# Patient Record
Sex: FEMALE | Race: BLACK OR AFRICAN AMERICAN | Employment: OTHER | URBAN - METROPOLITAN AREA
[De-identification: names, ages, dates, MRNs, and addresses within clinical notes are randomized per-mention and may not be internally consistent; named-entity substitution may affect disease eponyms.]

---

## 2019-07-02 LAB — LDL-C, EXTERNAL: 87

## 2019-12-19 LAB
CREATININE, EXTERNAL: 0.85
MICROALBUMIN UR TEST STR-MCNC: NORMAL MG/DL

## 2020-01-17 LAB — MAMMOGRAPHY, EXTERNAL: NORMAL

## 2020-08-28 VITALS
TEMPERATURE: 98.8 F | HEART RATE: 67 BPM | RESPIRATION RATE: 18 BRPM | WEIGHT: 172 LBS | HEIGHT: 62 IN | OXYGEN SATURATION: 96 % | SYSTOLIC BLOOD PRESSURE: 131 MMHG | DIASTOLIC BLOOD PRESSURE: 63 MMHG | BODY MASS INDEX: 31.65 KG/M2

## 2020-08-28 PROBLEM — I10 HYPERTENSIVE DISORDER: Status: ACTIVE | Noted: 2020-08-28

## 2020-08-28 PROBLEM — K21.9 GERD (GASTROESOPHAGEAL REFLUX DISEASE): Status: ACTIVE | Noted: 2020-08-28

## 2020-08-28 RX ORDER — ALBUTEROL SULFATE 90 UG/1
AEROSOL, METERED RESPIRATORY (INHALATION)
COMMUNITY
End: 2020-11-10 | Stop reason: ALTCHOICE

## 2020-08-28 RX ORDER — HYDROCHLOROTHIAZIDE 25 MG/1
25 TABLET ORAL DAILY
COMMUNITY
End: 2021-03-08

## 2020-08-28 RX ORDER — FAMOTIDINE 20 MG/1
20 TABLET, FILM COATED ORAL 2 TIMES DAILY
COMMUNITY
End: 2020-11-10 | Stop reason: ALTCHOICE

## 2020-08-28 RX ORDER — METOPROLOL SUCCINATE 25 MG/1
TABLET, EXTENDED RELEASE ORAL DAILY
COMMUNITY
End: 2020-11-10 | Stop reason: ALTCHOICE

## 2020-08-28 RX ORDER — NAPROXEN 500 MG/1
500 TABLET ORAL 2 TIMES DAILY WITH MEALS
COMMUNITY
End: 2020-11-10 | Stop reason: ALTCHOICE

## 2020-11-10 ENCOUNTER — OFFICE VISIT (OUTPATIENT)
Dept: PRIMARY CARE CLINIC | Age: 80
End: 2020-11-10
Payer: MEDICARE

## 2020-11-10 VITALS
HEART RATE: 71 BPM | HEIGHT: 60 IN | SYSTOLIC BLOOD PRESSURE: 114 MMHG | TEMPERATURE: 98.5 F | OXYGEN SATURATION: 98 % | WEIGHT: 167 LBS | DIASTOLIC BLOOD PRESSURE: 60 MMHG | BODY MASS INDEX: 32.79 KG/M2 | RESPIRATION RATE: 18 BRPM

## 2020-11-10 DIAGNOSIS — R00.0 TACHYCARDIA: ICD-10-CM

## 2020-11-10 DIAGNOSIS — I10 ESSENTIAL HYPERTENSION: ICD-10-CM

## 2020-11-10 DIAGNOSIS — E66.9 OBESITY (BMI 30-39.9): ICD-10-CM

## 2020-11-10 DIAGNOSIS — H81.13 BENIGN PAROXYSMAL POSITIONAL VERTIGO DUE TO BILATERAL VESTIBULAR DISORDER: Primary | ICD-10-CM

## 2020-11-10 PROCEDURE — 99214 OFFICE O/P EST MOD 30 MIN: CPT | Performed by: NURSE PRACTITIONER

## 2020-11-10 RX ORDER — MECLIZINE HYDROCHLORIDE 25 MG/1
25 TABLET ORAL
Qty: 30 TAB | Refills: 0 | Status: SHIPPED | OUTPATIENT
Start: 2020-11-10 | End: 2020-11-10 | Stop reason: SDUPTHER

## 2020-11-10 RX ORDER — MECLIZINE HYDROCHLORIDE 25 MG/1
25 TABLET ORAL
Qty: 30 TAB | Refills: 0 | Status: SHIPPED | OUTPATIENT
Start: 2020-11-10 | End: 2020-11-20

## 2020-11-10 RX ORDER — METOPROLOL SUCCINATE 25 MG/1
0.5 TABLET, EXTENDED RELEASE ORAL DAILY
COMMUNITY
End: 2022-04-06 | Stop reason: SDUPTHER

## 2020-11-10 NOTE — PATIENT INSTRUCTIONS
Vertigo: Exercises Introduction Here are some examples of exercises for you to try. The exercises may be suggested for a condition or for rehabilitation. Start each exercise slowly. Ease off the exercises if you start to have pain. You will be told when to start these exercises and which ones will work best for you. How to do the exercises Exercise 1 1. Stand with a chair in front of you and a wall behind you. If you begin to fall, you may use them for support. 2. Stand with your feet together and your arms at your sides. 3. Move your head up and down 10 times. Exercise 2 1. Move your head side to side 10 times. Exercise 3 1. Move your head diagonally up and down 10 times. Exercise 4 1. Move your head diagonally up and down 10 times on the other side. Follow-up care is a key part of your treatment and safety. Be sure to make and go to all appointments, and call your doctor if you are having problems. It's also a good idea to know your test results and keep a list of the medicines you take. Where can you learn more? Go to http://www.gray.com/ Enter F349 in the search box to learn more about \"Vertigo: Exercises. \" Current as of: April 15, 2020               Content Version: 12.6 © 2006-2020 [employer]. Care instructions adapted under license by [employer] (which disclaims liability or warranty for this information). If you have questions about a medical condition or this instruction, always ask your healthcare professional. [place] [address] any warranty or liability for your use of this information. Vertigo: Exercises Introduction Here are some examples of exercises for you to try. The exercises may be suggested for a condition or for rehabilitation. Start each exercise slowly. Ease off the exercises if you start to have pain.  
You will be told when to start these exercises and which ones will work best for you. How to do the exercises Exercise 1  
4. Stand with a chair in front of you and a wall behind you. If you begin to fall, you may use them for support. 5. Stand with your feet together and your arms at your sides. 6. Move your head up and down 10 times. Exercise 2 2. Move your head side to side 10 times. Exercise 3  
2. Move your head diagonally up and down 10 times. Exercise 4  
2. Move your head diagonally up and down 10 times on the other side. Follow-up care is a key part of your treatment and safety. Be sure to make and go to all appointments, and call your doctor if you are having problems. It's also a good idea to know your test results and keep a list of the medicines you take. Where can you learn more? Go to http://www.gray.com/ Enter F349 in the search box to learn more about \"Vertigo: Exercises. \" Current as of: April 15, 2020               Content Version: 12.6 © 2006-2020 [employer]. Care instructions adapted under license by [employer] (which disclaims liability or warranty for this information). If you have questions about a medical condition or this instruction, always ask your healthcare professional. [address] any warranty or liability for your use of this information. [place] Exercises for Vertigo: Care Instructions Your Care Instructions Simple exercises can help you regain your balance when you have vertigo. If you have Ménière's disease, benign paroxysmal positional vertigo (BPPV), or another inner ear problem, you may have vertigo off and on. Do these exercises first thing in the morning and before you go to bed. You might get dizzy when you first start them. If this happens, try to do them for at least 5 minutes. Do a group of exercises at a time, starting at the top of the list. It may take several weeks before you can do all the exercises without feeling dizzy. Follow-up care is a key part of your treatment and safety. Be sure to make and go to all appointments, and call your doctor if you are having problems. It's also a good idea to know your test results and keep a list of the medicines you take. How can you care for yourself at home? Exercise 1 While sitting on the side of the bed and holding your head still: 
· Look up as far as you can. · Look down as far as you can. · Look from side to side as far as you can. · Stretch your arm straight out in front of you. Focus on your index finger. Continue to focus on your finger while you bring it to your nose. Exercise 2 While sitting on the side of the bed: · Bring your head as far back as you can. · Bring your head forward to touch your chin to your chest. 
· Turn your head from side to side. · Do these exercises first with your eyes open. Then try with your eyes closed. Exercise 3 While sitting on the side of the bed: · Shrug your shoulders straight upward, then relax them. · Bend over and try to touch the ground with your fingers. Then go back to a sitting position. · Toss a small ball from one hand to the other. Throw the ball higher than your eyes so you have to look up. Exercise 4 While standing (with someone close by if you feel uncomfortable): 
· Repeat Exercise 1. 
· Repeat Exercise 2. 
· Pass a ball between your legs and above your head. · Sit down and then stand up. Repeat. Turn around in a [place] a different way each time you stand. · With someone close by to help you, try the above exercises with your eyes closed. Exercise 5 In a room that is cleared of obstacles: 
· Walk to a corner of the room, turn to your right, and walk back to the starting point. Now, repeat and turn left. · Walk up and down a slope. Now try stairs. · While holding on to someone's arm, try these exercises with your eyes closed. When should you call for help? Watch closely for changes in your health, and be sure to contact your doctor if: 
  · You do not get better as expected. Where can you learn more? Go to http://www.gray.com/ Enter F155 in the search box to learn more about \"[place] Exercises for Vertigo: Care Instructions. \" Current as of: April 15, 2020               Content Version: 12.6 © 2006-2020 [employer].. Care instructions adapted under license by [employer] (which disclaims liability or warranty for this information). If you have questions about a medical condition or this instruction, always ask your healthcare professional. [address] any warranty or liability for your use of this information. [place] Exercises for Vertigo: Care Instructions Your Care Instructions Simple exercises can help you regain your balance when you have vertigo. If you have Ménière's disease, benign paroxysmal positional vertigo (BPPV), or another inner ear problem, you may have vertigo off and on. Do these exercises first thing in the morning and before you go to bed. You might get dizzy when you first start them. If this happens, try to do them for at least 5 minutes. Do a group of exercises at a time, starting at the top of the list. It may take several weeks before you can do all the exercises without feeling dizzy. Follow-up care is a key part of your treatment and safety. Be sure to make and go to all appointments, and call your doctor if you are having problems. It's also a good idea to know your test results and keep a list of the medicines you take. How can you care for yourself at home? Exercise 1 While sitting on the side of the bed and holding your head still: 
· Look up as far as you can. · Look down as far as you can. · Look from side to side as far as you can. · Stretch your arm straight out in front of you.  Focus on your index finger. Continue to focus on your finger while you bring it to your nose. Exercise 2 While sitting on the side of the bed: · Bring your head as far back as you can. · Bring your head forward to touch your chin to your chest. 
· Turn your head from side to side. · Do these exercises first with your eyes open. Then try with your eyes closed. Exercise 3 While sitting on the side of the bed: · Shrug your shoulders straight upward, then relax them. · Bend over and try to touch the ground with your fingers. Then go back to a sitting position. · Toss a small ball from one hand to the other. Throw the ball higher than your eyes so you have to look up. Exercise 4 While standing (with someone close by if you feel uncomfortable): 
· Repeat Exercise 1. 
· Repeat Exercise 2. 
· Pass a ball between your legs and above your head. · Sit down and then stand up. Repeat. Turn around in a [place] a different way each time you stand. · With someone close by to help you, try the above exercises with your eyes closed. Exercise 5 In a room that is cleared of obstacles: 
· Walk to a corner of the room, turn to your right, and walk back to the starting point. Now, repeat and turn left. · Walk up and down a slope. Now try stairs. · While holding on to someone's arm, try these exercises with your eyes closed. When should you call for help? Watch closely for changes in your health, and be sure to contact your doctor if: 
  · You do not get better as expected. Where can you learn more? Go to http://www.gray.com/ Enter V791 in the search box to learn more about \"[place] Exercises for Vertigo: Care Instructions. \" Current as of: April 15, 2020               Content Version: 12.6 © 7391-9915 [employer]. Care instructions adapted under license by [employer] (which disclaims liability or warranty for this information).  If you have questions about a medical condition or this instruction, always ask your healthcare professional. [place] [address] any warranty or liability for your use of this information.

## 2020-11-13 ENCOUNTER — TELEPHONE (OUTPATIENT)
Dept: PRIMARY CARE CLINIC | Age: 80
End: 2020-11-13

## 2020-11-13 LAB
ALBUMIN SERPL-MCNC: 4.6 G/DL (ref 3.7–4.7)
ALBUMIN/GLOB SERPL: 1.9 {RATIO} (ref 1.2–2.2)
ALP SERPL-CCNC: 79 IU/L (ref 39–117)
ALT SERPL-CCNC: 17 IU/L (ref 0–32)
APPEARANCE UR: CLEAR
AST SERPL-CCNC: 23 IU/L (ref 0–40)
BACTERIA #/AREA URNS HPF: NORMAL /[HPF]
BASOPHILS # BLD AUTO: 0 X10E3/UL (ref 0–0.2)
BASOPHILS NFR BLD AUTO: 1 %
BILIRUB SERPL-MCNC: 0.3 MG/DL (ref 0–1.2)
BILIRUB UR QL STRIP: NEGATIVE
BUN SERPL-MCNC: 14 MG/DL (ref 8–27)
BUN/CREAT SERPL: 15 (ref 12–28)
CALCIUM SERPL-MCNC: 9.8 MG/DL (ref 8.7–10.3)
CASTS URNS QL MICRO: NORMAL /LPF
CHLORIDE SERPL-SCNC: 102 MMOL/L (ref 96–106)
CO2 SERPL-SCNC: 26 MMOL/L (ref 20–29)
COLOR UR: YELLOW
CREAT SERPL-MCNC: 0.95 MG/DL (ref 0.57–1)
EOSINOPHIL # BLD AUTO: 0.1 X10E3/UL (ref 0–0.4)
EOSINOPHIL NFR BLD AUTO: 1 %
EPI CELLS #/AREA URNS HPF: NORMAL /HPF (ref 0–10)
ERYTHROCYTE [DISTWIDTH] IN BLOOD BY AUTOMATED COUNT: 14.4 % (ref 11.7–15.4)
GLOBULIN SER CALC-MCNC: 2.4 G/DL (ref 1.5–4.5)
GLUCOSE SERPL-MCNC: 79 MG/DL (ref 65–99)
GLUCOSE UR QL: NEGATIVE
HCT VFR BLD AUTO: 40.6 % (ref 34–46.6)
HGB BLD-MCNC: 13.5 G/DL (ref 11.1–15.9)
HGB UR QL STRIP: NEGATIVE
IMM GRANULOCYTES # BLD AUTO: 0 X10E3/UL (ref 0–0.1)
IMM GRANULOCYTES NFR BLD AUTO: 0 %
KETONES UR QL STRIP: NEGATIVE
LEUKOCYTE ESTERASE UR QL STRIP: ABNORMAL
LYMPHOCYTES # BLD AUTO: 3.1 X10E3/UL (ref 0.7–3.1)
LYMPHOCYTES NFR BLD AUTO: 54 %
MCH RBC QN AUTO: 27.3 PG (ref 26.6–33)
MCHC RBC AUTO-ENTMCNC: 33.3 G/DL (ref 31.5–35.7)
MCV RBC AUTO: 82 FL (ref 79–97)
MICRO URNS: ABNORMAL
MONOCYTES # BLD AUTO: 0.4 X10E3/UL (ref 0.1–0.9)
MONOCYTES NFR BLD AUTO: 7 %
MUCOUS THREADS URNS QL MICRO: PRESENT
NEUTROPHILS # BLD AUTO: 2.2 X10E3/UL (ref 1.4–7)
NEUTROPHILS NFR BLD AUTO: 37 %
NITRITE UR QL STRIP: NEGATIVE
PH UR STRIP: 6.5 [PH] (ref 5–7.5)
PLATELET # BLD AUTO: 240 X10E3/UL (ref 150–450)
POTASSIUM SERPL-SCNC: 4.1 MMOL/L (ref 3.5–5.2)
PROT SERPL-MCNC: 7 G/DL (ref 6–8.5)
PROT UR QL STRIP: NEGATIVE
RBC # BLD AUTO: 4.94 X10E6/UL (ref 3.77–5.28)
RBC #/AREA URNS HPF: NORMAL /HPF (ref 0–2)
SODIUM SERPL-SCNC: 141 MMOL/L (ref 134–144)
SP GR UR: 1.02 (ref 1–1.03)
UROBILINOGEN UR STRIP-MCNC: 1 MG/DL (ref 0.2–1)
WBC # BLD AUTO: 5.8 X10E3/UL (ref 3.4–10.8)
WBC #/AREA URNS HPF: NORMAL /HPF (ref 0–5)

## 2020-11-13 NOTE — PROGRESS NOTES
Georgia Shay is a [de-identified] y.o. female who presents to the office today for the following:    Chief Complaint   Patient presents with    Dizziness       Past Medical History:   Diagnosis Date    Arthritis     Diverticulosis     GERD (gastroesophageal reflux disease)     Hypertension     Tachycardia     Wears glasses        Past Surgical History:   Procedure Laterality Date    HX COLONOSCOPY      HX HYSTERECTOMY      partial        Family History   Problem Relation Age of Onset    Diabetes Mother     Arthritis-osteo Father         Social History     Tobacco Use    Smoking status: Current Every Day Smoker     Packs/day: 0.50    Smokeless tobacco: Never Used   Substance Use Topics    Alcohol use: Yes     Comment: occasional    Drug use: Never        HPI  Patient here with c/o feeling off balance for several days especially when getting up. Denies an LOC, chest pain, n/v, sob, palpitations or falls. Has had similar symptoms in past. Has PMH of hypertension, GERD and tachycardia that she takes medication for. Otherwise has been feeling well. Current Outpatient Medications on File Prior to Visit   Medication Sig    metoprolol succinate (TOPROL-XL) 25 mg XL tablet Take 0.5 Tabs by mouth daily.  hydroCHLOROthiazide (HYDRODIURIL) 25 mg tablet Take 25 mg by mouth daily. No current facility-administered medications on file prior to visit. Medications Ordered Today   Medications    DISCONTD: meclizine (ANTIVERT) 25 mg tablet     Sig: Take 1 Tab by mouth three (3) times daily as needed for Dizziness for up to 10 days. Dispense:  30 Tab     Refill:  0    meclizine (ANTIVERT) 25 mg tablet     Sig: Take 1 Tab by mouth three (3) times daily as needed for Dizziness for up to 10 days. Dispense:  30 Tab     Refill:  0        Review of Systems   Constitutional: Negative. HENT: Negative for congestion, ear pain, nosebleeds, sinus pain and sore throat. Eyes: Negative. Respiratory: Negative. Cardiovascular: Negative. Gastrointestinal: Negative. Genitourinary: Negative. Musculoskeletal: Positive for myalgias (occasional). Neurological: Positive for dizziness. Negative for tingling, tremors, sensory change, speech change, focal weakness, seizures, loss of consciousness, weakness and headaches. Visit Vitals  /60 (BP 1 Location: Left arm, BP Patient Position: Standing)   Pulse 71   Temp 98.5 °F (36.9 °C) (Tympanic)   Resp 18   Ht 5' (1.524 m)   Wt 167 lb (75.8 kg)   SpO2 98%   BMI 32.61 kg/m²       Physical Exam  Vitals signs and nursing note reviewed. Constitutional:       Appearance: Normal appearance. HENT:      Right Ear: Tympanic membrane normal.      Left Ear: Tympanic membrane normal.      Nose: Nose normal.      Mouth/Throat:      Mouth: Mucous membranes are moist.      Pharynx: Oropharynx is clear. Eyes:      Pupils: Pupils are equal, round, and reactive to light. Cardiovascular:      Rate and Rhythm: Normal rate and regular rhythm. Pulses: Normal pulses. Heart sounds: Normal heart sounds. Pulmonary:      Effort: Pulmonary effort is normal.      Breath sounds: Normal breath sounds. Abdominal:      General: Bowel sounds are normal.      Palpations: Abdomen is soft. Musculoskeletal: Normal range of motion. Skin:     General: Skin is warm and dry. Neurological:      Mental Status: She is alert. Mental status is at baseline. 1. 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 Dispense: 30 Tab; Refill: 0    2.  Essential hypertension  Blood pressure is controlled and continue medication as directed  Check labwork as this is overdue  - CBC WITH AUTOMATED DIFF  - METABOLIC PANEL, COMPREHENSIVE  - URINALYSIS W/ RFLX MICROSCOPIC    3. Tachycardia  This is stable and continue BB therapy as directed    4. Obesity  Continue to encourage weight loss. Recommend decreasing excess fat, salt and sugar in diet along with getting regular exercise 3-5 times weekly for 30-45 minutes consistently. Patient verbalizes understanding of plan of care as discussed above    Follow-up and Dispositions    · Return in about 3 months (around 2/10/2021) for or sooner for worsening symptoms.

## 2020-11-13 NOTE — TELEPHONE ENCOUNTER
called pt and informed them of the results    ----- Message from Virginia Morelos NP sent at 11/13/2020  3:19 PM EST -----  Please let patient know that lab results are essentially normal.

## 2020-12-05 ENCOUNTER — HOSPITAL ENCOUNTER (EMERGENCY)
Age: 80
Discharge: HOME | End: 2020-12-05
Attending: EMERGENCY MEDICINE
Payer: MEDICARE

## 2020-12-05 ENCOUNTER — APPOINTMENT (OUTPATIENT)
Dept: GENERAL RADIOLOGY | Age: 80
End: 2020-12-05
Attending: EMERGENCY MEDICINE
Payer: MEDICARE

## 2020-12-05 VITALS
RESPIRATION RATE: 16 BRPM | OXYGEN SATURATION: 99 % | WEIGHT: 167 LBS | BODY MASS INDEX: 32.61 KG/M2 | HEART RATE: 72 BPM | TEMPERATURE: 98.8 F | DIASTOLIC BLOOD PRESSURE: 61 MMHG | SYSTOLIC BLOOD PRESSURE: 145 MMHG

## 2020-12-05 DIAGNOSIS — S20.211A CONTUSION OF RIGHT CHEST WALL, INITIAL ENCOUNTER: Primary | ICD-10-CM

## 2020-12-05 PROCEDURE — 74011250637 HC RX REV CODE- 250/637: Performed by: EMERGENCY MEDICINE

## 2020-12-05 PROCEDURE — 71046 X-RAY EXAM CHEST 2 VIEWS: CPT

## 2020-12-05 PROCEDURE — 99283 EMERGENCY DEPT VISIT LOW MDM: CPT

## 2020-12-05 RX ORDER — IBUPROFEN 400 MG/1
400 TABLET ORAL ONCE
Status: COMPLETED | OUTPATIENT
Start: 2020-12-05 | End: 2020-12-05

## 2020-12-05 RX ADMIN — IBUPROFEN 400 MG: 400 TABLET, FILM COATED ORAL at 08:39

## 2020-12-05 NOTE — ED TRIAGE NOTES
Pt states she fell out of bed last night and is having right sided rib pain. Pt rates pain at 5/10 that increases with movement and bending. Pt has no difficulty breathing.

## 2020-12-05 NOTE — ED PROVIDER NOTES
HPI   Patient reports that she fell from her bed while sleeping overnight striking her anterior chest and causing pain. Pain is described as a sharp and stabbing and is worsened by bending at the waist and deep inspiration. It is relieved by rest.  Patient has not attempted any other form of relief. She denies shortness of breath, severe pain, any other injury or trauma.   Past Medical History:   Diagnosis Date    Arthritis     Diverticulosis     GERD (gastroesophageal reflux disease)     Hypertension     Tachycardia     Wears glasses        Past Surgical History:   Procedure Laterality Date    HX COLONOSCOPY      HX HYSTERECTOMY      partial         Family History:   Problem Relation Age of Onset    Diabetes Mother     Arthritis-osteo Father        Social History     Socioeconomic History    Marital status:      Spouse name: Not on file    Number of children: Not on file    Years of education: Not on file    Highest education level: Not on file   Occupational History    Not on file   Social Needs    Financial resource strain: Not on file    Food insecurity     Worry: Not on file     Inability: Not on file    Transportation needs     Medical: Not on file     Non-medical: Not on file   Tobacco Use    Smoking status: Current Every Day Smoker     Packs/day: 0.50    Smokeless tobacco: Never Used   Substance and Sexual Activity    Alcohol use: Yes     Comment: occasional    Drug use: Never    Sexual activity: Not on file   Lifestyle    Physical activity     Days per week: Not on file     Minutes per session: Not on file    Stress: Not on file   Relationships    Social connections     Talks on phone: Not on file     Gets together: Not on file     Attends [employer] service: Not on file     Active member of club or organization: Not on file     Attends meetings of clubs or organizations: Not on file     Relationship status: Not on file    Intimate partner violence     Fear of current or ex partner: Not on file     Emotionally abused: Not on file     Physically abused: Not on file     Forced sexual activity: Not on file   Other Topics Concern    Not on file   Social History Narrative    Not on file         ALLERGIES: Pcn [penicillins]    Review of Systems   Constitutional: Negative. HENT: Negative. Eyes: Negative. Respiratory: Negative. Cardiovascular: Negative. Gastrointestinal: Negative. Endocrine: Negative. Genitourinary: Negative. Musculoskeletal: Negative. Allergic/Immunologic: Negative. Neurological: Negative. Hematological: Negative. Psychiatric/Behavioral: Negative. All other systems reviewed and are negative. Vitals:    12/05/20 0829   BP: (!) 145/61   Pulse: 72   Resp: 16   Temp: 98.8 °F (37.1 °C)   SpO2: 99%   Weight: 75.8 kg (167 lb)            Physical Exam  Vitals signs and nursing note reviewed. Constitutional:       General: She is not in acute distress. Appearance: Normal appearance. She is normal weight. She is not ill-appearing, toxic-appearing or diaphoretic. HENT:      Head: Normocephalic and atraumatic. Nose: Nose normal.      Mouth/Throat:      Mouth: Mucous membranes are moist.   Eyes:      Extraocular Movements: Extraocular movements intact. Pupils: Pupils are equal, round, and reactive to light. Neck:      Musculoskeletal: Normal range of motion. Pulmonary:      Effort: Pulmonary effort is normal.      Breath sounds: Normal breath sounds. Comments: Mild lower sternal tenderness. No obvious sign of serious trauma. Chest:      Chest wall: Tenderness present. Musculoskeletal: Normal range of motion. General: No swelling, tenderness, deformity or signs of injury. Skin:     General: Skin is warm and dry. Neurological:      General: No focal deficit present. Mental Status: She is alert and oriented to person, place, and time. Mental status is at baseline.       Cranial Nerves: No cranial nerve deficit. Psychiatric:         Mood and Affect: Mood normal.         Behavior: Behavior normal.          MDM       History and physical consistent with a chest wall/rib contusion. Will check x-ray to rule out fracture. Likely discharge.   Procedures

## 2020-12-09 ENCOUNTER — OFFICE VISIT (OUTPATIENT)
Dept: PRIMARY CARE CLINIC | Age: 80
End: 2020-12-09
Payer: MEDICARE

## 2020-12-09 VITALS
TEMPERATURE: 96.8 F | OXYGEN SATURATION: 98 % | RESPIRATION RATE: 18 BRPM | SYSTOLIC BLOOD PRESSURE: 140 MMHG | HEIGHT: 60 IN | HEART RATE: 71 BPM | BODY MASS INDEX: 33.77 KG/M2 | WEIGHT: 172 LBS | DIASTOLIC BLOOD PRESSURE: 69 MMHG

## 2020-12-09 DIAGNOSIS — S20.211A CONTUSION OF RIB ON RIGHT SIDE, INITIAL ENCOUNTER: Primary | ICD-10-CM

## 2020-12-09 PROCEDURE — 99213 OFFICE O/P EST LOW 20 MIN: CPT | Performed by: NURSE PRACTITIONER

## 2020-12-09 RX ORDER — CYCLOBENZAPRINE HCL 5 MG
5 TABLET ORAL
Qty: 20 TAB | Refills: 0 | Status: SHIPPED | OUTPATIENT
Start: 2020-12-09 | End: 2021-02-02 | Stop reason: ALTCHOICE

## 2020-12-09 NOTE — PROGRESS NOTES
Yoel Russ is a [de-identified] y.o. female who presents to the office today for the following:    Chief Complaint   Patient presents with    ED Follow-up    Fall    Rib Pain       Past Medical History:   Diagnosis Date    Arthritis     Diverticulosis     GERD (gastroesophageal reflux disease)     Hypertension     Tachycardia     Wears glasses        Past Surgical History:   Procedure Laterality Date    HX COLONOSCOPY      HX HYSTERECTOMY      partial        Family History   Problem Relation Age of Onset    Diabetes Mother     Arthritis-osteo Father         Social History     Tobacco Use    Smoking status: Current Every Day Smoker     Packs/day: 0.50    Smokeless tobacco: Never Used   Substance Use Topics    Alcohol use: Yes     Comment: occasional    Drug use: Never        HPI  Patient here for follow up from ED after fall that occurred on 12/5/20. States that she accidentally rolled backwards out of bed and landed on right side. She was having pain in right ribs and went to ED. Did x-rays which were normal and started her on ibuprofen. Is still very sore now but does see improvement. Denies any shortness of breath or sharp pain. No pain in shoulder, back or hip on right side. Current Outpatient Medications on File Prior to Visit   Medication Sig    metoprolol succinate (TOPROL-XL) 25 mg XL tablet Take 0.5 Tabs by mouth daily.  hydroCHLOROthiazide (HYDRODIURIL) 25 mg tablet Take 25 mg by mouth daily. No current facility-administered medications on file prior to visit. Medications Ordered Today   Medications    cyclobenzaprine (FLEXERIL) 5 mg tablet     Sig: Take 1 Tab by mouth three (3) times daily as needed for Muscle Spasm(s). Dispense:  20 Tab     Refill:  0        Review of Systems   Constitutional: Negative. Respiratory: Negative. Cardiovascular: Positive for chest pain (right chest wall). Negative for palpitations, orthopnea, claudication and leg swelling. Gastrointestinal: Negative. Genitourinary: Negative. Musculoskeletal: Positive for falls and myalgias. Negative for back pain and neck pain. Neurological: Negative. Visit Vitals  BP (!) 140/69 (BP 1 Location: Left arm, BP Patient Position: Sitting)   Pulse 71   Temp 96.8 °F (36 °C) (Tympanic)   Resp 18   Ht 5' (1.524 m)   Wt 172 lb (78 kg)   SpO2 98%   BMI 33.59 kg/m²       Physical Exam  Vitals signs and nursing note reviewed. Constitutional:       Appearance: Normal appearance. Cardiovascular:      Rate and Rhythm: Normal rate and regular rhythm. Pulses: Normal pulses. Heart sounds: Normal heart sounds. Pulmonary:      Effort: Pulmonary effort is normal.      Breath sounds: Normal breath sounds. Abdominal:      General: Bowel sounds are normal.      Palpations: Abdomen is soft. Tenderness: There is no abdominal tenderness. Hernia: No hernia is present. Musculoskeletal:         General: Tenderness (over right chest wall and anterior upper and lateral ribs) present. Skin:     General: Skin is warm and dry. Neurological:      Mental Status: She is alert. Mental status is at baseline. 1. Contusion of rib on right side, initial encounter  She has some continued tenderness along upper anterior and lateral ribs but no bruising and ROM normal.  Reviewed X-ray done in ED which was normal  Symptoms are improving per patient and will continue on ibuprofen w/ food prn and also may alternate with tylenol prn  Will add low dose of muscle relaxant to use prn (primarily at night due to causes drowsiness(  Recommend heat and stretching  Informed to follow up with provider if not continuing to improve or has any worsening  - cyclobenzaprine (FLEXERIL) 5 mg tablet; Take 1 Tab by mouth three (3) times daily as needed for Muscle Spasm(s). Dispense: 20 Tab;  Refill: 0      Patient verbalizes understanding of plan of care as discussed above    Follow-up and Dispositions · Return if symptoms worsen or fail to improve.

## 2020-12-10 ENCOUNTER — TELEPHONE (OUTPATIENT)
Dept: PRIMARY CARE CLINIC | Age: 80
End: 2020-12-10

## 2020-12-10 RX ORDER — METHOCARBAMOL 500 MG/1
500 TABLET, FILM COATED ORAL 3 TIMES DAILY
Qty: 30 TAB | Refills: 0 | Status: SHIPPED | OUTPATIENT
Start: 2020-12-10 | End: 2021-01-21 | Stop reason: ALTCHOICE

## 2021-01-21 ENCOUNTER — OFFICE VISIT (OUTPATIENT)
Dept: PRIMARY CARE CLINIC | Age: 81
End: 2021-01-21
Payer: MEDICARE

## 2021-01-21 VITALS
BODY MASS INDEX: 33.4 KG/M2 | RESPIRATION RATE: 18 BRPM | HEART RATE: 67 BPM | DIASTOLIC BLOOD PRESSURE: 58 MMHG | SYSTOLIC BLOOD PRESSURE: 123 MMHG | TEMPERATURE: 97.1 F | WEIGHT: 171 LBS | OXYGEN SATURATION: 98 %

## 2021-01-21 DIAGNOSIS — H81.13 BENIGN PAROXYSMAL POSITIONAL VERTIGO DUE TO BILATERAL VESTIBULAR DISORDER: Primary | ICD-10-CM

## 2021-01-21 PROCEDURE — G8427 DOCREV CUR MEDS BY ELIG CLIN: HCPCS | Performed by: NURSE PRACTITIONER

## 2021-01-21 PROCEDURE — 99213 OFFICE O/P EST LOW 20 MIN: CPT | Performed by: NURSE PRACTITIONER

## 2021-01-21 PROCEDURE — G8752 SYS BP LESS 140: HCPCS | Performed by: NURSE PRACTITIONER

## 2021-01-21 PROCEDURE — G8536 NO DOC ELDER MAL SCRN: HCPCS | Performed by: NURSE PRACTITIONER

## 2021-01-21 PROCEDURE — G8417 CALC BMI ABV UP PARAM F/U: HCPCS | Performed by: NURSE PRACTITIONER

## 2021-01-21 PROCEDURE — 1090F PRES/ABSN URINE INCON ASSESS: CPT | Performed by: NURSE PRACTITIONER

## 2021-01-21 PROCEDURE — G8754 DIAS BP LESS 90: HCPCS | Performed by: NURSE PRACTITIONER

## 2021-01-21 PROCEDURE — G8432 DEP SCR NOT DOC, RNG: HCPCS | Performed by: NURSE PRACTITIONER

## 2021-01-21 PROCEDURE — G8400 PT W/DXA NO RESULTS DOC: HCPCS | Performed by: NURSE PRACTITIONER

## 2021-01-21 PROCEDURE — 1101F PT FALLS ASSESS-DOCD LE1/YR: CPT | Performed by: NURSE PRACTITIONER

## 2021-01-21 RX ORDER — MECLIZINE HYDROCHLORIDE 25 MG/1
TABLET ORAL
COMMUNITY
End: 2021-01-21 | Stop reason: SDUPTHER

## 2021-01-21 RX ORDER — MECLIZINE HYDROCHLORIDE 25 MG/1
25 TABLET ORAL
Qty: 30 TAB | Refills: 0 | Status: SHIPPED | OUTPATIENT
Start: 2021-01-21 | End: 2021-06-24 | Stop reason: ALTCHOICE

## 2021-01-21 NOTE — PROGRESS NOTES
Griselda Street is a [de-identified] y.o. female who presents to the office today for the following:    Chief Complaint   Patient presents with    Follow-up    Dizziness       Past Medical History:   Diagnosis Date    Arthritis     Diverticulosis     GERD (gastroesophageal reflux disease)     Hypertension     Tachycardia     Vertigo     Wears glasses        Past Surgical History:   Procedure Laterality Date    HX COLONOSCOPY      HX HYSTERECTOMY      partial        Family History   Problem Relation Age of Onset    Diabetes Mother     Arthritis-osteo Father         Social History     Tobacco Use    Smoking status: Current Every Day Smoker     Packs/day: 0.50    Smokeless tobacco: Never Used   Substance Use Topics    Alcohol use: Yes     Comment: occasional    Drug use: Never        HPI  Patient here today with c/o vertigo. States that today she is feeling better but was having some intermittent feelings of being off balance again especially when getting up in am. Started taking meclizine again and this seems to have resolved it. Has had similar problem in past and was seen back in 11/2020 for same complaint. States that after that visit, her symptoms resolved and had not had any again until past few days. Denies any frequent headaches, numbness, focal weakness, palpitations/chest pain, vision changes or speech changes associated. Has been doing all of her normal activities with no problem. Current Outpatient Medications on File Prior to Visit   Medication Sig    cyclobenzaprine (FLEXERIL) 5 mg tablet Take 1 Tab by mouth three (3) times daily as needed for Muscle Spasm(s).  metoprolol succinate (TOPROL-XL) 25 mg XL tablet Take 0.5 Tabs by mouth daily.  hydroCHLOROthiazide (HYDRODIURIL) 25 mg tablet Take 25 mg by mouth daily.  [DISCONTINUED] meclizine (ANTIVERT) 25 mg tablet Take  by mouth three (3) times daily as needed for Dizziness.     [DISCONTINUED] methocarbamoL (ROBAXIN) 500 mg tablet Take 1 Tab by mouth three (3) times daily. No current facility-administered medications on file prior to visit. Medications Ordered Today   Medications    meclizine (ANTIVERT) 25 mg tablet     Sig: Take 1 Tab by mouth three (3) times daily as needed for Dizziness. Dispense:  30 Tab     Refill:  0        Review of Systems   Constitutional: Negative. HENT: Negative for congestion, hearing loss, sinus pain and sore throat. Eyes: Negative. Respiratory: Negative. Cardiovascular: Negative. Gastrointestinal: Negative. Genitourinary: Negative. Musculoskeletal: Positive for myalgias. Neurological: Positive for dizziness. Negative for tingling, tremors, sensory change, speech change, focal weakness, seizures, loss of consciousness, weakness and headaches. Visit Vitals  BP (!) 123/58 (BP 1 Location: Left arm, BP Patient Position: Sitting)   Pulse 67   Temp 97.1 °F (36.2 °C) (Tympanic)   Resp 18   Wt 171 lb (77.6 kg)   SpO2 98%   BMI 33.40 kg/m²       Physical Exam  Vitals signs and nursing note reviewed. Constitutional:       Appearance: Normal appearance. Cardiovascular:      Rate and Rhythm: Normal rate and regular rhythm. Pulses: Normal pulses. Heart sounds: Normal heart sounds. Pulmonary:      Effort: Pulmonary effort is normal.      Breath sounds: Normal breath sounds. Abdominal:      General: Bowel sounds are normal.      Palpations: Abdomen is soft. Tenderness: There is no abdominal tenderness. Musculoskeletal: Normal range of motion. Right lower leg: No edema. Left lower leg: No edema. Skin:     General: Skin is warm and dry. Neurological:      Mental Status: She is alert and oriented to person, place, and time. Mental status is at baseline. Motor: No weakness.       Coordination: Coordination normal.      Gait: Gait normal.            1. Benign paroxysmal positional vertigo due to bilateral vestibular disorder  Patient symptoms did resolve for extended time and have been classic to BPPV   She feels better today with no current symptoms and declines any further work up for now   Does have meclizine available to use prn as well as she was given exercises that may help also  Advised if she has any constant sx or other associated symptoms develop then want her to follow up       Patient verbalizes understanding of plan of care as discussed above    Follow-up and Dispositions    · Return if symptoms worsen or fail to improve.

## 2021-01-28 ENCOUNTER — TELEPHONE (OUTPATIENT)
Dept: PRIMARY CARE CLINIC | Age: 81
End: 2021-01-28

## 2021-02-02 ENCOUNTER — OFFICE VISIT (OUTPATIENT)
Dept: PRIMARY CARE CLINIC | Age: 81
End: 2021-02-02
Payer: MEDICARE

## 2021-02-02 ENCOUNTER — HOSPITAL ENCOUNTER (OUTPATIENT)
Dept: GENERAL RADIOLOGY | Age: 81
Discharge: HOME | End: 2021-02-02
Payer: MEDICARE

## 2021-02-02 ENCOUNTER — TELEPHONE (OUTPATIENT)
Dept: PRIMARY CARE CLINIC | Age: 81
End: 2021-02-02

## 2021-02-02 DIAGNOSIS — S80.12XA CONTUSION OF LEFT LEG, INITIAL ENCOUNTER: ICD-10-CM

## 2021-02-02 DIAGNOSIS — S70.02XA CONTUSION OF LEFT HIP, INITIAL ENCOUNTER: ICD-10-CM

## 2021-02-02 DIAGNOSIS — I10 ESSENTIAL HYPERTENSION: ICD-10-CM

## 2021-02-02 DIAGNOSIS — R42 VERTIGO: Primary | ICD-10-CM

## 2021-02-02 DIAGNOSIS — Z91.81 AT HIGH RISK FOR FALLS: ICD-10-CM

## 2021-02-02 PROCEDURE — G8536 NO DOC ELDER MAL SCRN: HCPCS | Performed by: NURSE PRACTITIONER

## 2021-02-02 PROCEDURE — 73502 X-RAY EXAM HIP UNI 2-3 VIEWS: CPT

## 2021-02-02 PROCEDURE — G8417 CALC BMI ABV UP PARAM F/U: HCPCS | Performed by: NURSE PRACTITIONER

## 2021-02-02 PROCEDURE — 1101F PT FALLS ASSESS-DOCD LE1/YR: CPT | Performed by: NURSE PRACTITIONER

## 2021-02-02 PROCEDURE — G8752 SYS BP LESS 140: HCPCS | Performed by: NURSE PRACTITIONER

## 2021-02-02 PROCEDURE — G8427 DOCREV CUR MEDS BY ELIG CLIN: HCPCS | Performed by: NURSE PRACTITIONER

## 2021-02-02 PROCEDURE — G8432 DEP SCR NOT DOC, RNG: HCPCS | Performed by: NURSE PRACTITIONER

## 2021-02-02 PROCEDURE — 99213 OFFICE O/P EST LOW 20 MIN: CPT | Performed by: NURSE PRACTITIONER

## 2021-02-02 PROCEDURE — G8754 DIAS BP LESS 90: HCPCS | Performed by: NURSE PRACTITIONER

## 2021-02-02 PROCEDURE — G8400 PT W/DXA NO RESULTS DOC: HCPCS | Performed by: NURSE PRACTITIONER

## 2021-02-02 PROCEDURE — 1090F PRES/ABSN URINE INCON ASSESS: CPT | Performed by: NURSE PRACTITIONER

## 2021-02-02 PROCEDURE — 73590 X-RAY EXAM OF LOWER LEG: CPT

## 2021-02-02 NOTE — PROGRESS NOTES
Please let patient know that x-ray tib/fib is normal as well as hip. Does note surgical hardware appears intact from prior healed femur fracture. If pain persisting want her to follow up.

## 2021-02-02 NOTE — TELEPHONE ENCOUNTER
called pt and was informed of the results        ----- Message from Connor MondayROMEO sent at 2/2/2021  4:16 PM EST -----  Please let patient know that x-ray tib/fib is normal as well as hip. Does note surgical hardware appears intact from prior healed femur fracture. If pain persisting want her to follow up.

## 2021-02-07 VITALS
WEIGHT: 170 LBS | BODY MASS INDEX: 33.2 KG/M2 | OXYGEN SATURATION: 99 % | SYSTOLIC BLOOD PRESSURE: 129 MMHG | TEMPERATURE: 97.5 F | DIASTOLIC BLOOD PRESSURE: 68 MMHG | RESPIRATION RATE: 18 BRPM | HEART RATE: 74 BPM

## 2021-02-07 PROBLEM — Z91.81 AT HIGH RISK FOR FALLS: Status: ACTIVE | Noted: 2021-02-07

## 2021-02-08 NOTE — PATIENT INSTRUCTIONS

## 2021-02-08 NOTE — PROGRESS NOTES
Kaleb Pittman is a [de-identified] y.o. female who presents to the office today for the following:    Chief Complaint   Patient presents with   [facility] Fall    Hip Pain    Leg Pain       Past Medical History:   Diagnosis Date    Arthritis     Diverticulosis     GERD (gastroesophageal reflux disease)     Hypertension     Tachycardia     Vertigo     Wears glasses        Past Surgical History:   Procedure Laterality Date    HX COLONOSCOPY      HX HYSTERECTOMY      partial        Family History   Problem Relation Age of Onset    Diabetes Mother     Arthritis-osteo Father         Social History     Tobacco Use    Smoking status: Current Every Day Smoker     Packs/day: 0.50    Smokeless tobacco: Never Used   Substance Use Topics    Alcohol use: Yes     Comment: occasional    Drug use: Never        HPI  Patient here today with c/o fall that occurred 1 week ago. States that she tripped and fell on left hip as well as hit left leg on object. Was able to get up on her own and been able to walk on leg. Reports pain iss improved  slightly but does experience increased pain with walking. Denies any bruising, redness, numbness or swelling. No other areas of pain from fall. She was seen back in January for dizziness or vertigo symptoms  and states that she is still taking the meclizine frequently. Has not had any symptoms today however or taken any medication. Thinks that the meclizine helps most of the time when taking. Continues to deny any frequent headaches, numbness, focal weakness, palpitations/chest pain, vision changes or speech changes. Has been doing all of her normal activities without difficulty. Current Outpatient Medications on File Prior to Visit   Medication Sig    meclizine (ANTIVERT) 25 mg tablet Take 1 Tab by mouth three (3) times daily as needed for Dizziness.  metoprolol succinate (TOPROL-XL) 25 mg XL tablet Take 0.5 Tabs by mouth daily.     hydroCHLOROthiazide (HYDRODIURIL) 25 mg tablet Take 25 mg by mouth daily. No current facility-administered medications on file prior to visit. No orders of the defined types were placed in this encounter. Review of Systems   Constitutional: Negative. HENT: Negative for congestion, hearing loss, sinus pain and sore throat. Eyes: Negative. Respiratory: Negative. Cardiovascular: Negative. Gastrointestinal: Negative. Genitourinary: Negative. Musculoskeletal: Positive for joint pain (left hip and leg) and myalgias. Neurological: Positive for dizziness. Negative for tingling, tremors, sensory change, speech change, focal weakness, seizures, loss of consciousness, weakness and headaches. Visit Vitals  /68   Pulse 74   Temp 97.5 °F (36.4 °C) (Tympanic)   Resp 18   Wt 170 lb (77.1 kg)   SpO2 99%   BMI 33.20 kg/m²       Physical Exam  Vitals signs and nursing note reviewed. Constitutional:       Appearance: Normal appearance. Cardiovascular:      Rate and Rhythm: Normal rate and regular rhythm. Pulses: Normal pulses. Heart sounds: Normal heart sounds. Pulmonary:      Effort: Pulmonary effort is normal.      Breath sounds: Normal breath sounds. Abdominal:      General: Bowel sounds are normal.      Palpations: Abdomen is soft. Tenderness: There is no abdominal tenderness. Musculoskeletal: Normal range of motion. Right lower leg: No edema. Left lower leg: No edema. Skin:     General: Skin is warm and dry. Neurological:      Mental Status: She is alert and oriented to person, place, and time. Mental status is at baseline. Motor: No weakness. Coordination: Coordination normal.      Gait: Gait normal.              1. Vertigo  She has still been having symptoms of what appears to be BPPV but not having any symptoms today. Seems to be taking the meclizine fairly often and does feel it helps symptoms.   Have recommended given another fall that we do further work up to ensure no other causes  But she continues to decline. Did advise that by not allowing further work up possible delay in diagnosis could lead to worse prognosis which in some instances may be life threatening. Verbalizes understanding. She states she will consider allowing further work up if continues to require frequent use of meclizine. Encouraged to change positions slowly    2. Essential hypertension  Blood pressure is controlled and continue medication as directed    3. Contusion of left leg, initial encounter  Appears to be contusion but will check x-ray tib/fib given pain persisting  Continue heat prn and range of motion  Continue tylenol prn  Discuss further recommendations pending results from x-ray  - XR TIB/FIB LT; Future    4. Contusion of left hip, initial encounter  Appears to also be continue but will check x-ray hip given pain persisting  See above  - XR HIP LT W OR WO PELV 2-3 VWS; Future    5. At high risk for falls  Patient recommended to use assistive device such as cane if experiences vertigo  Remove objects/loose rugs from walking path around home  Non-slip shoes  Use handrails when possible going up/down steps, shower, etc.      Patient verbalizes understanding of plan of care as discussed above and agrees to follow up if any persistent or worsening symptoms    Follow-up and Dispositions    · Return in about 4 weeks (around 3/2/2021) for or sooner for worsening symptoms.

## 2021-03-08 RX ORDER — HYDROCHLOROTHIAZIDE 25 MG/1
TABLET ORAL
Qty: 90 TAB | Refills: 0 | Status: SHIPPED | OUTPATIENT
Start: 2021-03-08 | End: 2021-05-16

## 2021-05-16 RX ORDER — HYDROCHLOROTHIAZIDE 25 MG/1
TABLET ORAL
Qty: 90 TAB | Refills: 0 | Status: SHIPPED | OUTPATIENT
Start: 2021-05-16 | End: 2021-08-18

## 2021-05-27 ENCOUNTER — OFFICE VISIT (OUTPATIENT)
Dept: PRIMARY CARE CLINIC | Age: 81
End: 2021-05-27
Payer: MEDICARE

## 2021-05-27 VITALS
HEART RATE: 67 BPM | WEIGHT: 168 LBS | DIASTOLIC BLOOD PRESSURE: 59 MMHG | SYSTOLIC BLOOD PRESSURE: 134 MMHG | OXYGEN SATURATION: 97 % | BODY MASS INDEX: 32.81 KG/M2 | RESPIRATION RATE: 17 BRPM | TEMPERATURE: 98.6 F

## 2021-05-27 DIAGNOSIS — J20.9 ACUTE BRONCHITIS, UNSPECIFIED ORGANISM: Primary | ICD-10-CM

## 2021-05-27 PROCEDURE — G8432 DEP SCR NOT DOC, RNG: HCPCS | Performed by: NURSE PRACTITIONER

## 2021-05-27 PROCEDURE — 1101F PT FALLS ASSESS-DOCD LE1/YR: CPT | Performed by: NURSE PRACTITIONER

## 2021-05-27 PROCEDURE — 99213 OFFICE O/P EST LOW 20 MIN: CPT | Performed by: NURSE PRACTITIONER

## 2021-05-27 PROCEDURE — G8427 DOCREV CUR MEDS BY ELIG CLIN: HCPCS | Performed by: NURSE PRACTITIONER

## 2021-05-27 PROCEDURE — G8536 NO DOC ELDER MAL SCRN: HCPCS | Performed by: NURSE PRACTITIONER

## 2021-05-27 PROCEDURE — G8400 PT W/DXA NO RESULTS DOC: HCPCS | Performed by: NURSE PRACTITIONER

## 2021-05-27 PROCEDURE — 1090F PRES/ABSN URINE INCON ASSESS: CPT | Performed by: NURSE PRACTITIONER

## 2021-05-27 PROCEDURE — G8752 SYS BP LESS 140: HCPCS | Performed by: NURSE PRACTITIONER

## 2021-05-27 PROCEDURE — G8754 DIAS BP LESS 90: HCPCS | Performed by: NURSE PRACTITIONER

## 2021-05-27 PROCEDURE — G8417 CALC BMI ABV UP PARAM F/U: HCPCS | Performed by: NURSE PRACTITIONER

## 2021-05-27 RX ORDER — PREDNISONE 20 MG/1
20 TABLET ORAL
Qty: 5 TABLET | Refills: 0 | Status: SHIPPED | OUTPATIENT
Start: 2021-05-27 | End: 2021-06-24 | Stop reason: ALTCHOICE

## 2021-05-27 RX ORDER — AZITHROMYCIN 250 MG/1
TABLET, FILM COATED ORAL
Qty: 6 TABLET | Refills: 0 | Status: SHIPPED | OUTPATIENT
Start: 2021-05-27 | End: 2021-06-24 | Stop reason: ALTCHOICE

## 2021-05-27 NOTE — PROGRESS NOTES
Chief Complaint   Patient presents with    Cough    Joint Pain     Nagging cough for 2 days.  And having some neck pain

## 2021-05-27 NOTE — PROGRESS NOTES
Delmi Bragg is a [de-identified] y.o. female who presents to the office today for the following:    Chief Complaint   Patient presents with    Cough    Joint Pain       Past Medical History:   Diagnosis Date    Arthritis     Diverticulosis     GERD (gastroesophageal reflux disease)     Hypertension     Tachycardia     Vertigo     Wears glasses        Past Surgical History:   Procedure Laterality Date    HX COLONOSCOPY      HX HYSTERECTOMY      partial        Family History   Problem Relation Age of Onset    Diabetes Mother     Arthritis-osteo Father         Social History     Tobacco Use    Smoking status: Current Every Day Smoker     Packs/day: 0.50    Smokeless tobacco: Never Used   Substance Use Topics    Alcohol use: Yes     Comment: occasional    Drug use: Never        HPI  Patient here today with complaint of congestion, cough, hoarseness, body aches and sore throat over 1 week. States that she has tried taking some OTC medicine but does not feel symptoms improving. Throat and muscle hurting due to coughing so much. Denies any fever, chest pain or shortness of breath. No known exposure to covid 19. Current Outpatient Medications on File Prior to Visit   Medication Sig    hydroCHLOROthiazide (HYDRODIURIL) 25 mg tablet TAKE 1 TABLET EVERY DAY    meclizine (ANTIVERT) 25 mg tablet Take 1 Tab by mouth three (3) times daily as needed for Dizziness.  metoprolol succinate (TOPROL-XL) 25 mg XL tablet Take 0.5 Tabs by mouth daily. No current facility-administered medications on file prior to visit. Medications Ordered Today   Medications    azithromycin (ZITHROMAX) 250 mg tablet     Sig: Take 2 tablets today, then take 1 tablet daily x 4 days     Dispense:  6 Tablet     Refill:  0    predniSONE (DELTASONE) 20 mg tablet     Sig: Take 20 mg by mouth daily (with breakfast). Dispense:  5 Tablet     Refill:  0        Review of Systems   Constitutional: Positive for malaise/fatigue.  Negative for chills, diaphoresis, fever and weight loss. HENT: Positive for congestion and sore throat. Negative for ear pain. Respiratory: Positive for cough and sputum production. Negative for hemoptysis, shortness of breath and wheezing. Cardiovascular: Negative. Gastrointestinal: Negative. Genitourinary: Negative. Musculoskeletal: Positive for myalgias. Visit Vitals  BP (!) 134/59 (BP 1 Location: Left upper arm, BP Patient Position: Sitting, BP Cuff Size: Adult)   Pulse 67   Temp 98.6 °F (37 °C) (Oral)   Resp 17   Wt 168 lb (76.2 kg)   SpO2 97%   BMI 32.81 kg/m²       Physical Exam  Vitals and nursing note reviewed. Constitutional:       Appearance: Normal appearance. She is obese. HENT:      Right Ear: Tympanic membrane normal.      Left Ear: Tympanic membrane normal.      Nose: Congestion present. Mouth/Throat:      Pharynx: Posterior oropharyngeal erythema present. Eyes:      Pupils: Pupils are equal, round, and reactive to light. Cardiovascular:      Rate and Rhythm: Normal rate and regular rhythm. Pulses: Normal pulses. Heart sounds: Normal heart sounds. Pulmonary:      Effort: Pulmonary effort is normal.      Breath sounds: Rhonchi (upper airways) present. Abdominal:      General: Bowel sounds are normal.      Palpations: Abdomen is soft. Tenderness: There is no abdominal tenderness. There is no guarding or rebound. Hernia: No hernia is present. Musculoskeletal:         General: Normal range of motion. Right lower leg: No edema. Left lower leg: No edema. Skin:     General: Skin is warm and dry. Neurological:      Mental Status: She is alert and oriented to person, place, and time. Mental status is at baseline. 1. Acute bronchitis, unspecified organism  Will treat  w/ antibiotic due to prolonged and worsening symptoms. Also start on prednisone 20mg x 5 days  Recommended using OTC mucinex  to help clear congestion.    Supportive care encouraged including rest, increased oral fluids, cool mist humidifier and Tylenol prn. Notify provider is symptoms not improving over next 3-4 days  - azithromycin (ZITHROMAX) 250 mg tablet; Take 2 tablets today, then take 1 tablet daily x 4 days  Dispense: 6 Tablet; Refill: 0  - predniSONE (DELTASONE) 20 mg tablet; Take 20 mg by mouth daily (with breakfast). Dispense: 5 Tablet; Refill: 0      Patient verbalizes understanding of plan of care as discussed above    Follow-up and Dispositions    · Return in about 4 weeks (around 6/24/2021).

## 2021-06-24 ENCOUNTER — OFFICE VISIT (OUTPATIENT)
Dept: PRIMARY CARE CLINIC | Age: 81
End: 2021-06-24
Payer: MEDICARE

## 2021-06-24 VITALS
HEART RATE: 71 BPM | HEIGHT: 60 IN | OXYGEN SATURATION: 98 % | DIASTOLIC BLOOD PRESSURE: 65 MMHG | WEIGHT: 168.38 LBS | BODY MASS INDEX: 33.06 KG/M2 | SYSTOLIC BLOOD PRESSURE: 136 MMHG | RESPIRATION RATE: 16 BRPM | TEMPERATURE: 97.3 F

## 2021-06-24 DIAGNOSIS — K21.9 GASTROESOPHAGEAL REFLUX DISEASE WITHOUT ESOPHAGITIS: ICD-10-CM

## 2021-06-24 DIAGNOSIS — R00.2 PALPITATIONS: ICD-10-CM

## 2021-06-24 DIAGNOSIS — I10 ESSENTIAL HYPERTENSION: Primary | ICD-10-CM

## 2021-06-24 DIAGNOSIS — Z00.00 MEDICARE ANNUAL WELLNESS VISIT, SUBSEQUENT: ICD-10-CM

## 2021-06-24 DIAGNOSIS — H81.13 BENIGN PAROXYSMAL POSITIONAL VERTIGO DUE TO BILATERAL VESTIBULAR DISORDER: ICD-10-CM

## 2021-06-24 DIAGNOSIS — E66.9 OBESITY (BMI 30-39.9): ICD-10-CM

## 2021-06-24 DIAGNOSIS — F17.200 TOBACCO DEPENDENCE: ICD-10-CM

## 2021-06-24 DIAGNOSIS — Z71.89 ACP (ADVANCE CARE PLANNING): ICD-10-CM

## 2021-06-24 PROCEDURE — G8536 NO DOC ELDER MAL SCRN: HCPCS | Performed by: NURSE PRACTITIONER

## 2021-06-24 PROCEDURE — 1101F PT FALLS ASSESS-DOCD LE1/YR: CPT | Performed by: NURSE PRACTITIONER

## 2021-06-24 PROCEDURE — G8427 DOCREV CUR MEDS BY ELIG CLIN: HCPCS | Performed by: NURSE PRACTITIONER

## 2021-06-24 PROCEDURE — G8417 CALC BMI ABV UP PARAM F/U: HCPCS | Performed by: NURSE PRACTITIONER

## 2021-06-24 PROCEDURE — G8432 DEP SCR NOT DOC, RNG: HCPCS | Performed by: NURSE PRACTITIONER

## 2021-06-24 PROCEDURE — 99214 OFFICE O/P EST MOD 30 MIN: CPT | Performed by: NURSE PRACTITIONER

## 2021-06-24 PROCEDURE — G8400 PT W/DXA NO RESULTS DOC: HCPCS | Performed by: NURSE PRACTITIONER

## 2021-06-24 PROCEDURE — G8752 SYS BP LESS 140: HCPCS | Performed by: NURSE PRACTITIONER

## 2021-06-24 PROCEDURE — G0439 PPPS, SUBSEQ VISIT: HCPCS | Performed by: NURSE PRACTITIONER

## 2021-06-24 PROCEDURE — G8754 DIAS BP LESS 90: HCPCS | Performed by: NURSE PRACTITIONER

## 2021-06-24 PROCEDURE — 1090F PRES/ABSN URINE INCON ASSESS: CPT | Performed by: NURSE PRACTITIONER

## 2021-06-24 NOTE — ACP (ADVANCE CARE PLANNING)
Advance Care Planning     General Advance Care Planning (ACP) Conversation      Date of Conversation: 6/24/2021  Conducted with: Patient with Decision Making Capacity    Healthcare Decision Maker:     Click here to complete [address] including selection of the [address] Relationship (ie \"Primary\")  Today we documented Decision Maker(s) consistent with Legal Next of Kin hierarchy.     Content/Action Overview:   Has NO ACP documents/care preferences - information provided, considering goals and options  Reviewed DNR/DNI and patient elects Full Code (Attempt Resuscitation)  Topics discussed: treatment goals, benefit/burden of treatment options, artificial nutrition, ventilation preferences, hospitalization preferences, resuscitation preferences, end of life care preferences (vegetative state/imminent death) and hospice care       Length of Voluntary ACP Conversation in minutes:  <16 minutes (Non-Billable)    Yaritza Jc, NP

## 2021-06-24 NOTE — PROGRESS NOTES
1. Have you been to the ER, urgent care clinic since your last visit? Hospitalized since your last visit? No    2. Have you seen or consulted any other health care providers outside of the [address] [place] [address] since your last visit? Include any pap smears or colon screening.  No     Chief Complaint   Patient presents with    Cough     4 week follow-up    Annual Wellness Visit     Medicare wellness subsequent

## 2021-06-24 NOTE — PROGRESS NOTES
Fran Lloyd is a [de-identified] y.o. female who presents to the office today for the following:    Chief Complaint   Patient presents with   [facility] Annual Wellness Visit     Medicare wellness subsequent    GERD    Hypertension       Past Medical History:   Diagnosis Date    Arthritis     Diverticulosis     GERD (gastroesophageal reflux disease)     Hypertension     Tachycardia     Vertigo     Wears glasses        Past Surgical History:   Procedure Laterality Date    HX COLONOSCOPY      HX HYSTERECTOMY      partial        Family History   Problem Relation Age of Onset    Diabetes Mother     Arthritis-osteo Father         Social History     Tobacco Use    Smoking status: Current Every Day Smoker     Packs/day: 0.50    Smokeless tobacco: Never Used   Vaping Use    Vaping Use: Never used   Substance Use Topics    Alcohol use: Yes     Comment: occasional    Drug use: Never      HPI  Patient here with PMH of hypertension, GERD,obesity, tobacco dependence, BPPV and tachycardia. States that she has been taking medications as directed. Feeling well but does reports she has been having increased \"fluttering\" in chest. States it usually lasts for a few minutes and then will go away. Has had similar problem in past but was not as frequent. No associated, chest pain, shortness of breath or dizziness. Current Outpatient Medications on File Prior to Visit   Medication Sig    hydroCHLOROthiazide (HYDRODIURIL) 25 mg tablet TAKE 1 TABLET EVERY DAY    metoprolol succinate (TOPROL-XL) 25 mg XL tablet Take 1 Tablet by mouth daily.  [DISCONTINUED] azithromycin (ZITHROMAX) 250 mg tablet Take 2 tablets today, then take 1 tablet daily x 4 days (Patient not taking: Reported on 6/24/2021)    [DISCONTINUED] predniSONE (DELTASONE) 20 mg tablet Take 20 mg by mouth daily (with breakfast).  (Patient not taking: Reported on 6/24/2021)    [DISCONTINUED] meclizine (ANTIVERT) 25 mg tablet Take 1 Tab by mouth three (3) times daily as needed for Dizziness. (Patient not taking: Reported on 6/24/2021)     No current facility-administered medications on file prior to visit. No orders of the defined types were placed in this encounter. Review of Systems   Constitutional: Negative. HENT: Negative for congestion, ear pain, nosebleeds, sinus pain and sore throat. Eyes: Negative. Respiratory: Negative for cough, hemoptysis, sputum production, shortness of breath and wheezing. Cardiovascular: Positive for palpitations. Negative for chest pain, orthopnea, claudication and leg swelling. Gastrointestinal: Negative. Genitourinary: Negative. Musculoskeletal: Positive for myalgias (occasional). Negative for falls. Skin: Negative. Neurological: Negative for dizziness, tingling, tremors, sensory change, speech change, focal weakness, seizures, loss of consciousness, weakness and headaches. Psychiatric/Behavioral: Negative. Visit Vitals  /65   Pulse 71   Temp 97.3 °F (36.3 °C) (Temporal)   Resp 16   Ht 5' (1.524 m)   Wt 168 lb 6 oz (76.4 kg)   SpO2 98%   BMI 32.88 kg/m²       Physical Exam  Vitals and nursing note reviewed. Constitutional:       Appearance: Normal appearance. HENT:      Right Ear: Tympanic membrane normal.      Left Ear: Tympanic membrane normal.      Nose: Nose normal.      Mouth/Throat:      Mouth: Mucous membranes are moist.      Pharynx: Oropharynx is clear. Eyes:      Pupils: Pupils are equal, round, and reactive to light. Neck:      Vascular: No carotid bruit. Cardiovascular:      Rate and Rhythm: Normal rate and regular rhythm. Pulses: Normal pulses. Heart sounds: Normal heart sounds. Pulmonary:      Effort: Pulmonary effort is normal.      Breath sounds: Normal breath sounds. Abdominal:      General: Bowel sounds are normal.      Palpations: Abdomen is soft. Tenderness: There is no abdominal tenderness. There is no guarding or rebound.       Hernia: No hernia is present. Musculoskeletal:         General: Normal range of motion. Lymphadenopathy:      Cervical: No cervical adenopathy. Skin:     General: Skin is warm and dry. Neurological:      Mental Status: She is alert. Mental status is at baseline. Gait: Gait normal.              1. Benign paroxysmal positional vertigo due to bilateral vestibular disorder  This has been stable     2. Essential hypertension  Blood pressure is controlled and continue medication as directed  Check labwork   - CBC WITH AUTOMATED DIFF  - METABOLIC PANEL, COMPREHENSIVE  - URINALYSIS W/ RFLX MICROSCOPIC    3. Palpitations  Has hx of tachycardia and on metoprolol  Recently experiencing increased \"fluttering\" in chest  Increase the metoprolol to 25mg from 12.5mg daily   She would like further eval with cardiology and will refer    4. Obesity  Continue to encourage weight loss. Recommend decreasing excess fat, salt and sugar in diet along with getting regular exercise 3-5 times weekly for 30-45 minutes consistently. 5.GERD  Symptoms stable and not on medication     6. Tobacco dependence  Continue to encourage smoking cessation and reports she is trying to stop. Does not want medication to help assist with this as she would like to do on her own    Patient verbalizes understanding of plan of care as discussed above    Follow-up and Dispositions    · Return in about 3 months (around 9/24/2021) for or sooner for worsening symptoms. Follow-up and Dispositions    · Return in about 3 months (around 9/24/2021) for or sooner for worsening symptoms. This is the Subsequent Medicare Annual Wellness Exam, performed 12 months or more after the Initial AWV or the last Subsequent AWV    I have reviewed the patient's medical history in detail and updated the computerized patient record.        Assessment/Plan   Education and counseling provided:  Are appropriate based on today's review and evaluation  End-of-Life planning (with patient's consent)  Pneumococcal Vaccine  Influenza Vaccine  Hepatitis B Vaccine  Screening Mammography  Screening Pap and pelvic (covered once every 2 years)  Colorectal cancer screening tests  Cardiovascular screening blood test  Bone mass measurement (DEXA)  Screening for glaucoma  Diabetes screening test    1. Essential hypertension  2. Gastroesophageal reflux disease without esophagitis  3. Obesity (BMI 30-39.9)  4. Benign paroxysmal positional vertigo due to bilateral vestibular disorder  5. Palpitations  -     REFERRAL TO CARDIOLOGY  6. Tobacco dependence  7. Medicare annual wellness visit, subsequent  8. ACP (advance care planning)       Depression Risk Factor Screening     3 most recent PHQ Screens 6/24/2021   Little interest or pleasure in doing things Not at all   Feeling down, depressed, irritable, or hopeless Not at all   Total Score PHQ 2 0       Alcohol Risk Screen    Do you average more than 1 drink per night or more than 7 drinks a week:  No    On any one occasion in the past three months have you have had more than 3 drinks containing alcohol:  No        Functional Ability and Level of Safety    Hearing: Hearing is good. Activities of Daily Living: The home contains: handrails  Patient does total self care      Ambulation: with no difficulty     Fall Risk:  Fall Risk Assessment, last 12 mths 6/24/2021   Able to walk? Yes   Fall in past 12 months? 0   Do you feel unsteady? 0   Are you worried about falling 0   Is TUG test greater than 12 seconds? -   Is the gait abnormal? -   Number of falls in past 12 months -   Fall with injury?  -      Abuse Screen:  Patient is not abused       Cognitive Screening    Has your family/caregiver stated any concerns about your memory: no     Cognitive Screening: Normal - Clock Drawing Test, Mini Cog Test    Health Maintenance Due     Health Maintenance Due   Topic Date Due    DTaP/Tdap/Td series (1 - Tdap) Never done    Shingrix Vaccine Age 50> (1 of 2) Never done    Bone Densitometry (Dexa) Screening  Never done    Medicare Yearly Exam  Never done       Patient Care Team   Patient Care Team:  Benjy Rivas NP as PCP - General (Physician Assistant)  Benjy Rivas NP as PCP - [facility] Empaneled Provider    History     Patient Active Problem List   Diagnosis Code    GERD (gastroesophageal reflux disease) K21.9    Hypertensive disorder I10    At high risk for falls Z91.81     Past Medical History:   Diagnosis Date    Arthritis     Diverticulosis     GERD (gastroesophageal reflux disease)     Hypertension     Tachycardia     Vertigo     Wears glasses       Past Surgical History:   Procedure Laterality Date    HX COLONOSCOPY      HX HYSTERECTOMY      partial     Current Outpatient Medications   Medication Sig Dispense Refill    hydroCHLOROthiazide (HYDRODIURIL) 25 mg tablet TAKE 1 TABLET EVERY DAY 90 Tab 0    metoprolol succinate (TOPROL-XL) 25 mg XL tablet Take 1 Tablet by mouth daily.        Allergies   Allergen Reactions    Pcn [Penicillins] Unknown (comments)       Family History   Problem Relation Age of Onset    Diabetes Mother     Arthritis-osteo Father      Social History     Tobacco Use    Smoking status: Current Every Day Smoker     Packs/day: 0.50    Smokeless tobacco: Never Used   Substance Use Topics    Alcohol use: Yes     Comment: occasional         Elisa Salinas NP

## 2021-06-25 LAB
ALBUMIN SERPL-MCNC: 4.3 G/DL (ref 3.7–4.7)
ALBUMIN/GLOB SERPL: 1.8 {RATIO} (ref 1.2–2.2)
ALP SERPL-CCNC: 75 IU/L (ref 48–121)
ALT SERPL-CCNC: 10 IU/L (ref 0–32)
APPEARANCE UR: CLEAR
AST SERPL-CCNC: 21 IU/L (ref 0–40)
BACTERIA #/AREA URNS HPF: NORMAL /[HPF]
BASOPHILS # BLD AUTO: 0 X10E3/UL (ref 0–0.2)
BASOPHILS NFR BLD AUTO: 1 %
BILIRUB SERPL-MCNC: 0.4 MG/DL (ref 0–1.2)
BILIRUB UR QL STRIP: NEGATIVE
BUN SERPL-MCNC: 11 MG/DL (ref 8–27)
BUN/CREAT SERPL: 12 (ref 12–28)
CALCIUM SERPL-MCNC: 9.5 MG/DL (ref 8.7–10.3)
CASTS URNS QL MICRO: NORMAL /LPF
CHLORIDE SERPL-SCNC: 105 MMOL/L (ref 96–106)
CHOLEST SERPL-MCNC: 173 MG/DL (ref 100–199)
CO2 SERPL-SCNC: 23 MMOL/L (ref 20–29)
COLOR UR: YELLOW
CREAT SERPL-MCNC: 0.95 MG/DL (ref 0.57–1)
EOSINOPHIL # BLD AUTO: 0 X10E3/UL (ref 0–0.4)
EOSINOPHIL NFR BLD AUTO: 1 %
EPI CELLS #/AREA URNS HPF: NORMAL /HPF (ref 0–10)
ERYTHROCYTE [DISTWIDTH] IN BLOOD BY AUTOMATED COUNT: 13.9 % (ref 11.7–15.4)
GLOBULIN SER CALC-MCNC: 2.4 G/DL (ref 1.5–4.5)
GLUCOSE SERPL-MCNC: 77 MG/DL (ref 65–99)
GLUCOSE UR QL: NEGATIVE
HCT VFR BLD AUTO: 39.4 % (ref 34–46.6)
HDLC SERPL-MCNC: 66 MG/DL
HGB BLD-MCNC: 12.8 G/DL (ref 11.1–15.9)
HGB UR QL STRIP: NEGATIVE
IMM GRANULOCYTES # BLD AUTO: 0 X10E3/UL (ref 0–0.1)
IMM GRANULOCYTES NFR BLD AUTO: 0 %
KETONES UR QL STRIP: NEGATIVE
LDLC SERPL CALC-MCNC: 91 MG/DL (ref 0–99)
LEUKOCYTE ESTERASE UR QL STRIP: ABNORMAL
LYMPHOCYTES # BLD AUTO: 2.3 X10E3/UL (ref 0.7–3.1)
LYMPHOCYTES NFR BLD AUTO: 57 %
MCH RBC QN AUTO: 27.1 PG (ref 26.6–33)
MCHC RBC AUTO-ENTMCNC: 32.5 G/DL (ref 31.5–35.7)
MCV RBC AUTO: 83 FL (ref 79–97)
MICRO URNS: ABNORMAL
MONOCYTES # BLD AUTO: 0.3 X10E3/UL (ref 0.1–0.9)
MONOCYTES NFR BLD AUTO: 7 %
NEUTROPHILS # BLD AUTO: 1.4 X10E3/UL (ref 1.4–7)
NEUTROPHILS NFR BLD AUTO: 34 %
NITRITE UR QL STRIP: NEGATIVE
PH UR STRIP: 5.5 [PH] (ref 5–7.5)
PLATELET # BLD AUTO: 208 X10E3/UL (ref 150–450)
POTASSIUM SERPL-SCNC: 5 MMOL/L (ref 3.5–5.2)
PROT SERPL-MCNC: 6.7 G/DL (ref 6–8.5)
PROT UR QL STRIP: NEGATIVE
RBC # BLD AUTO: 4.73 X10E6/UL (ref 3.77–5.28)
RBC #/AREA URNS HPF: NORMAL /HPF (ref 0–2)
SODIUM SERPL-SCNC: 141 MMOL/L (ref 134–144)
SP GR UR: 1.01 (ref 1–1.03)
TRIGL SERPL-MCNC: 90 MG/DL (ref 0–149)
TSH SERPL DL<=0.005 MIU/L-ACNC: 1.2 UIU/ML (ref 0.45–4.5)
UROBILINOGEN UR STRIP-MCNC: 0.2 MG/DL (ref 0.2–1)
VLDLC SERPL CALC-MCNC: 16 MG/DL (ref 5–40)
WBC # BLD AUTO: 4.1 X10E3/UL (ref 3.4–10.8)
WBC #/AREA URNS HPF: NORMAL /HPF (ref 0–5)

## 2021-06-25 NOTE — PROGRESS NOTES
Please let patient know that all labwork is essentially normal. If any questions or concerns, let me know.

## 2021-07-20 ENCOUNTER — OFFICE VISIT (OUTPATIENT)
Dept: PRIMARY CARE CLINIC | Age: 81
End: 2021-07-20
Payer: MEDICARE

## 2021-07-20 VITALS
WEIGHT: 168.8 LBS | TEMPERATURE: 99 F | DIASTOLIC BLOOD PRESSURE: 56 MMHG | OXYGEN SATURATION: 98 % | BODY MASS INDEX: 32.97 KG/M2 | HEART RATE: 72 BPM | RESPIRATION RATE: 18 BRPM | SYSTOLIC BLOOD PRESSURE: 124 MMHG

## 2021-07-20 DIAGNOSIS — F17.200 TOBACCO DEPENDENCE: ICD-10-CM

## 2021-07-20 DIAGNOSIS — J41.1 MUCOPURULENT CHRONIC BRONCHITIS (HCC): Primary | ICD-10-CM

## 2021-07-20 PROCEDURE — G8417 CALC BMI ABV UP PARAM F/U: HCPCS | Performed by: NURSE PRACTITIONER

## 2021-07-20 PROCEDURE — G8536 NO DOC ELDER MAL SCRN: HCPCS | Performed by: NURSE PRACTITIONER

## 2021-07-20 PROCEDURE — 1101F PT FALLS ASSESS-DOCD LE1/YR: CPT | Performed by: NURSE PRACTITIONER

## 2021-07-20 PROCEDURE — G8427 DOCREV CUR MEDS BY ELIG CLIN: HCPCS | Performed by: NURSE PRACTITIONER

## 2021-07-20 PROCEDURE — G8432 DEP SCR NOT DOC, RNG: HCPCS | Performed by: NURSE PRACTITIONER

## 2021-07-20 PROCEDURE — 99213 OFFICE O/P EST LOW 20 MIN: CPT | Performed by: NURSE PRACTITIONER

## 2021-07-20 PROCEDURE — G8400 PT W/DXA NO RESULTS DOC: HCPCS | Performed by: NURSE PRACTITIONER

## 2021-07-20 PROCEDURE — G8752 SYS BP LESS 140: HCPCS | Performed by: NURSE PRACTITIONER

## 2021-07-20 PROCEDURE — G8754 DIAS BP LESS 90: HCPCS | Performed by: NURSE PRACTITIONER

## 2021-07-20 PROCEDURE — 1090F PRES/ABSN URINE INCON ASSESS: CPT | Performed by: NURSE PRACTITIONER

## 2021-07-20 RX ORDER — DOXYCYCLINE 100 MG/1
100 TABLET ORAL 2 TIMES DAILY
Qty: 20 TABLET | Refills: 0 | Status: SHIPPED | OUTPATIENT
Start: 2021-07-20 | End: 2021-07-30

## 2021-07-20 NOTE — PROGRESS NOTES
Georgia Shay is a [de-identified] y.o. female who presents to the office today for the following:    Chief Complaint   Patient presents with    Cough       Past Medical History:   Diagnosis Date    Arthritis     Diverticulosis     GERD (gastroesophageal reflux disease)     Hypertension     Tachycardia     Vertigo     Wears glasses        Past Surgical History:   Procedure Laterality Date    HX COLONOSCOPY      HX HYSTERECTOMY      partial        Family History   Problem Relation Age of Onset    Diabetes Mother     Arthritis-osteo Father         Social History     Tobacco Use    Smoking status: Current Every Day Smoker     Packs/day: 0.50    Smokeless tobacco: Never Used   Vaping Use    Vaping Use: Never used   Substance Use Topics    Alcohol use: Yes     Comment: occasional    Drug use: Never        HPI  Patient with PMH of tobbacco dependence, GERD, obesity, hypertension, BPPV and tachycardia who presents with complaint of persistent productive cough ongoing for 2 weeks. States that she gets in coughing \"fits\" and gets up a lot of mucus especially in the morning. No fever, wheezing, shortness of breath or chest pain. Has however been having palpitations off and on but is seeing cardiologist about this. No medication she is taking OTC. Is a daily smoker. Fully vaccinated against covid 19. Current Outpatient Medications on File Prior to Visit   Medication Sig    hydroCHLOROthiazide (HYDRODIURIL) 25 mg tablet TAKE 1 TABLET EVERY DAY    metoprolol succinate (TOPROL-XL) 25 mg XL tablet Take 1 Tablet by mouth daily. No current facility-administered medications on file prior to visit. Medications Ordered Today   Medications    doxycycline (ADOXA) 100 mg tablet     Sig: Take 1 Tablet by mouth two (2) times a day for 10 days. Dispense:  20 Tablet     Refill:  0        Review of Systems   Constitutional: Negative for chills, fever, malaise/fatigue and weight loss.    HENT: Positive for congestion and sore throat. Negative for ear discharge, ear pain and sinus pain. Respiratory: Positive for cough and sputum production. Negative for hemoptysis, shortness of breath and wheezing. Cardiovascular: Positive for palpitations. Negative for chest pain, orthopnea, claudication and leg swelling. Gastrointestinal: Negative. Genitourinary: Negative. Musculoskeletal: Positive for myalgias. Neurological: Negative. Visit Vitals  BP (!) 124/56 (BP 1 Location: Left upper arm, BP Patient Position: Sitting, BP Cuff Size: Adult)   Pulse 72   Temp 99 °F (37.2 °C) (Oral)   Resp 18   Wt 168 lb 12.8 oz (76.6 kg)   SpO2 98%   BMI 32.97 kg/m²       Physical Exam  Vitals and nursing note reviewed. Constitutional:       Appearance: Normal appearance. She is obese. HENT:      Right Ear: Tympanic membrane normal.      Left Ear: Tympanic membrane normal.      Nose: Nose normal.      Mouth/Throat:      Pharynx: No oropharyngeal exudate or posterior oropharyngeal erythema. Eyes:      Pupils: Pupils are equal, round, and reactive to light. Cardiovascular:      Rate and Rhythm: Normal rate and regular rhythm. Pulses: Normal pulses. Heart sounds: Normal heart sounds. Pulmonary:      Effort: Pulmonary effort is normal.      Breath sounds: Rhonchi present. Chest:      Chest wall: Tenderness: upper airways. Abdominal:      General: Bowel sounds are normal.      Palpations: Abdomen is soft. Tenderness: There is no abdominal tenderness. Musculoskeletal:      Cervical back: No tenderness. Lymphadenopathy:      Cervical: No cervical adenopathy. Skin:     General: Skin is warm. Neurological:      Mental Status: She is alert. Mental status is at baseline. Psychiatric:         Mood and Affect: Mood normal.         Behavior: Behavior normal.            1. Mucopurulent chronic bronchitis (HCC)  Will treat w/ antibiotic due to prolonged  symptoms.    Recommended using OTC mucinex  to help clear congestion. Supportive care encouraged including rest, increased oral fluids, cool mist humidifier and Tylenol prn.   - doxycycline (ADOXA) 100 mg tablet; Take 1 Tablet by mouth two (2) times a day for 10 days. Dispense: 20 Tablet; Refill: 0    2. Tobacco dependence  Continue to encourage smoking cessation      Patient verbalizes understanding of plan of care as discussed above    Follow-up and Dispositions    · Return if symptoms worsen or fail to improve.

## 2021-08-18 RX ORDER — HYDROCHLOROTHIAZIDE 25 MG/1
TABLET ORAL
Qty: 90 TABLET | Refills: 0 | Status: SHIPPED | OUTPATIENT
Start: 2021-08-18 | End: 2021-09-16 | Stop reason: ALTCHOICE

## 2021-09-16 ENCOUNTER — OFFICE VISIT (OUTPATIENT)
Dept: PRIMARY CARE CLINIC | Age: 81
End: 2021-09-16
Payer: MEDICARE

## 2021-09-16 VITALS
BODY MASS INDEX: 33.01 KG/M2 | HEART RATE: 69 BPM | RESPIRATION RATE: 20 BRPM | WEIGHT: 169 LBS | TEMPERATURE: 97.6 F | DIASTOLIC BLOOD PRESSURE: 64 MMHG | SYSTOLIC BLOOD PRESSURE: 137 MMHG | OXYGEN SATURATION: 98 %

## 2021-09-16 DIAGNOSIS — J01.00 ACUTE NON-RECURRENT MAXILLARY SINUSITIS: Primary | ICD-10-CM

## 2021-09-16 DIAGNOSIS — R35.0 URINARY FREQUENCY: ICD-10-CM

## 2021-09-16 DIAGNOSIS — I10 ESSENTIAL HYPERTENSION: ICD-10-CM

## 2021-09-16 PROCEDURE — G8754 DIAS BP LESS 90: HCPCS | Performed by: NURSE PRACTITIONER

## 2021-09-16 PROCEDURE — G8400 PT W/DXA NO RESULTS DOC: HCPCS | Performed by: NURSE PRACTITIONER

## 2021-09-16 PROCEDURE — 1090F PRES/ABSN URINE INCON ASSESS: CPT | Performed by: NURSE PRACTITIONER

## 2021-09-16 PROCEDURE — G8427 DOCREV CUR MEDS BY ELIG CLIN: HCPCS | Performed by: NURSE PRACTITIONER

## 2021-09-16 PROCEDURE — G8752 SYS BP LESS 140: HCPCS | Performed by: NURSE PRACTITIONER

## 2021-09-16 PROCEDURE — G8432 DEP SCR NOT DOC, RNG: HCPCS | Performed by: NURSE PRACTITIONER

## 2021-09-16 PROCEDURE — G8417 CALC BMI ABV UP PARAM F/U: HCPCS | Performed by: NURSE PRACTITIONER

## 2021-09-16 PROCEDURE — 1101F PT FALLS ASSESS-DOCD LE1/YR: CPT | Performed by: NURSE PRACTITIONER

## 2021-09-16 PROCEDURE — G8536 NO DOC ELDER MAL SCRN: HCPCS | Performed by: NURSE PRACTITIONER

## 2021-09-16 PROCEDURE — 99214 OFFICE O/P EST MOD 30 MIN: CPT | Performed by: NURSE PRACTITIONER

## 2021-09-16 RX ORDER — MECLIZINE HYDROCHLORIDE 25 MG/1
25 TABLET ORAL
Qty: 30 TABLET | Refills: 0 | Status: SHIPPED | OUTPATIENT
Start: 2021-09-16 | End: 2021-09-26

## 2021-09-16 RX ORDER — LOSARTAN POTASSIUM 25 MG/1
25 TABLET ORAL DAILY
Qty: 90 TABLET | Refills: 0 | Status: SHIPPED | OUTPATIENT
Start: 2021-09-16 | End: 2021-12-08

## 2021-09-16 RX ORDER — DOXYCYCLINE 100 MG/1
100 TABLET ORAL 2 TIMES DAILY
Qty: 20 TABLET | Refills: 0 | Status: SHIPPED | OUTPATIENT
Start: 2021-09-16 | End: 2021-09-26

## 2021-09-16 RX ORDER — PREDNISONE 20 MG/1
20 TABLET ORAL
Qty: 5 TABLET | Refills: 0 | Status: SHIPPED | OUTPATIENT
Start: 2021-09-16 | End: 2021-10-19 | Stop reason: ALTCHOICE

## 2021-09-16 NOTE — PROGRESS NOTES
Shimon Aguila is a [de-identified] y.o. female who presents to the office today for the following:    Chief Complaint   Patient presents with    Cough    Cold Symptoms    Dizziness    Medication Refill       Past Medical History:   Diagnosis Date    Arthritis     Diverticulosis     GERD (gastroesophageal reflux disease)     Hypertension     Tachycardia     Vertigo     Wears glasses        Past Surgical History:   Procedure Laterality Date    HX COLONOSCOPY      HX HYSTERECTOMY      partial        Family History   Problem Relation Age of Onset    Diabetes Mother     Arthritis-osteo Father         Social History     Tobacco Use    Smoking status: Current Every Day Smoker     Packs/day: 0.50    Smokeless tobacco: Never Used   Vaping Use    Vaping Use: Never used   Substance Use Topics    Alcohol use: Yes     Comment: occasional    Drug use: Never        HPI  Patient with PMH of tobbacco dependence, GERD, obesity, hypertension, BPPV and tachycardia who presents with complaint of sinus congestion, headache and cough ongoing about 8 days. Denies fever or shortness of breath. Getting up green mucus. Reports that she also is having problems with urinating a lot. States this has been ongoing problem for a \"long\" time and thinks it is due to the Walk Score pill. \" Denies any burning or pain. Current Outpatient Medications on File Prior to Visit   Medication Sig    [DISCONTINUED] hydroCHLOROthiazide (HYDRODIURIL) 25 mg tablet TAKE 1 TABLET EVERY DAY    metoprolol succinate (TOPROL-XL) 25 mg XL tablet Take 1 Tablet by mouth daily. No current facility-administered medications on file prior to visit. Medications Ordered Today   Medications    predniSONE (DELTASONE) 20 mg tablet     Sig: Take 20 mg by mouth daily (with breakfast). Dispense:  5 Tablet     Refill:  0    meclizine (ANTIVERT) 25 mg tablet     Sig: Take 1 Tablet by mouth three (3) times daily as needed for Dizziness for up to 10 days. Dispense:  30 Tablet     Refill:  0    doxycycline (ADOXA) 100 mg tablet     Sig: Take 1 Tablet by mouth two (2) times a day for 10 days. Dispense:  20 Tablet     Refill:  0    losartan (COZAAR) 25 mg tablet     Sig: Take 1 Tablet by mouth daily. Dispense:  90 Tablet     Refill:  0        Review of Systems   Constitutional: Negative. HENT: Positive for congestion and sinus pain. Negative for ear pain and sore throat. Respiratory: Positive for cough and sputum production. Negative for shortness of breath and wheezing. Cardiovascular: Negative. Gastrointestinal: Negative. Genitourinary: Positive for frequency. Negative for dysuria, flank pain and hematuria. Musculoskeletal: Positive for myalgias. Neurological: Positive for dizziness (intermittent) and headaches. Negative for tingling, tremors, sensory change, speech change, focal weakness, loss of consciousness and weakness. Visit Vitals  /64 (BP 1 Location: Left upper arm, BP Patient Position: Sitting, BP Cuff Size: Adult)   Pulse 69   Temp 97.6 °F (36.4 °C) (Temporal)   Resp 20   Wt 169 lb (76.7 kg)   SpO2 98%   BMI 33.01 kg/m²       Physical Exam  Vitals and nursing note reviewed. Constitutional:       Appearance: Normal appearance. HENT:      Right Ear: Tympanic membrane normal.      Left Ear: Tympanic membrane normal.      Nose: Congestion present. Mouth/Throat:      Mouth: Mucous membranes are moist.      Pharynx: Oropharynx is clear. Eyes:      Pupils: Pupils are equal, round, and reactive to light. Cardiovascular:      Rate and Rhythm: Normal rate and regular rhythm. Pulses: Normal pulses. Heart sounds: Normal heart sounds. Pulmonary:      Effort: Pulmonary effort is normal.      Breath sounds: Normal breath sounds. Abdominal:      General: Bowel sounds are normal.      Palpations: Abdomen is soft. Tenderness: There is no abdominal tenderness. There is no guarding or rebound. Hernia: No hernia is present. Musculoskeletal:         General: Normal range of motion. Right lower leg: No edema. Left lower leg: No edema. Skin:     General: Skin is warm and dry. Neurological:      Mental Status: She is alert. Mental status is at baseline. 1. Acute non-recurrent maxillary sinusitis  Will treat for sinusitis w/ antibiotic due to prolonged and worsening symptoms. Recommended using OTC mucinex  to help clear congestion. Supportive care encouraged including rest, increased oral fluids, cool mist humidifier and Tylenol/Motrin prn. Informed to follow up with provider for persistent or worsening symptoms  - predniSONE (DELTASONE) 20 mg tablet; Take 20 mg by mouth daily (with breakfast). Dispense: 5 Tablet; Refill: 0  - meclizine (ANTIVERT) 25 mg tablet; Take 1 Tablet by mouth three (3) times daily as needed for Dizziness for up to 10 days. Dispense: 30 Tablet; Refill: 0  - doxycycline (ADOXA) 100 mg tablet; Take 1 Tablet by mouth two (2) times a day for 10 days. Dispense: 20 Tablet; Refill: 0    2. Essential hypertension  Will stop HCTZ and start on losartan 25mg daily  Encouraged to check blood pressure at home and bring readings to follow up    3. Urinary frequency  Check urine when able to provide sample but does not appear new symptom or to be likely infectious concern. - URINALYSIS W/ RFLX MICROSCOPIC      Patient verbalizes understanding of plan of care as discussed above    Follow-up and Dispositions    · Return in about 4 weeks (around 10/14/2021) for or sooner for worsening symptoms.

## 2021-10-11 ENCOUNTER — HOSPITAL ENCOUNTER (EMERGENCY)
Age: 81
Discharge: HOME | End: 2021-10-11
Attending: EMERGENCY MEDICINE
Payer: MEDICARE

## 2021-10-11 ENCOUNTER — APPOINTMENT (OUTPATIENT)
Dept: GENERAL RADIOLOGY | Age: 81
End: 2021-10-11
Attending: EMERGENCY MEDICINE
Payer: MEDICARE

## 2021-10-11 VITALS
SYSTOLIC BLOOD PRESSURE: 132 MMHG | HEIGHT: 60 IN | RESPIRATION RATE: 18 BRPM | TEMPERATURE: 98.1 F | WEIGHT: 165 LBS | DIASTOLIC BLOOD PRESSURE: 73 MMHG | HEART RATE: 62 BPM | BODY MASS INDEX: 32.39 KG/M2 | OXYGEN SATURATION: 100 %

## 2021-10-11 DIAGNOSIS — S90.01XA CONTUSION OF RIGHT ANKLE, INITIAL ENCOUNTER: Primary | ICD-10-CM

## 2021-10-11 PROCEDURE — 73600 X-RAY EXAM OF ANKLE: CPT

## 2021-10-11 PROCEDURE — 99283 EMERGENCY DEPT VISIT LOW MDM: CPT

## 2021-10-11 NOTE — ED TRIAGE NOTES
Patient reports right ankle pain that started Sunday night after she fell out of her bed. Denies hx of gout or previous injury. Swelling noted to outer part of ankle. Patient ambulated to triage.

## 2021-10-11 NOTE — ED PROVIDER NOTES
EMERGENCY DEPARTMENT HISTORY AND PHYSICAL EXAM      Date: 10/11/2021  Patient Name: Odalys Jimenes    History of Presenting Illness     Chief Complaint   Patient presents with    Ankle Injury       History Provided By: Patient    HPI: Odalys Jimenes, 80 y.o. female with a past medical history significant hypertension presents to the ED with cc of right ankle pain, patient states she fell out of bed 1 week ago injuring her right ankle patient has been able to ambulate on it does cause increased pain, pain intensity 8/10    There are no other complaints, changes, or physical findings at this time. PCP: Eugene Miner NP    No current facility-administered medications on file prior to encounter. Current Outpatient Medications on File Prior to Encounter   Medication Sig Dispense Refill    predniSONE (DELTASONE) 20 mg tablet Take 20 mg by mouth daily (with breakfast). 5 Tablet 0    losartan (COZAAR) 25 mg tablet Take 1 Tablet by mouth daily. 90 Tablet 0    metoprolol succinate (TOPROL-XL) 25 mg XL tablet Take 1 Tablet by mouth daily. Past History     Past Medical History:  Past Medical History:   Diagnosis Date    Arthritis     Diverticulosis     GERD (gastroesophageal reflux disease)     Hypertension     Tachycardia     Vertigo     Wears glasses        Past Surgical History:  Past Surgical History:   Procedure Laterality Date    HX COLONOSCOPY      HX HYSTERECTOMY      partial       Family History:  Family History   Problem Relation Age of Onset    Diabetes Mother     Arthritis-osteo Father        Social History:  Social History     Tobacco Use    Smoking status: Current Every Day Smoker     Packs/day: 0.50    Smokeless tobacco: Never Used   Vaping Use    Vaping Use: Never used   Substance Use Topics    Alcohol use: Yes     Comment: occasional    Drug use: Never       Allergies:   Allergies   Allergen Reactions    Pcn [Penicillins] Unknown (comments)         Review of Systems     Review of Systems   Constitutional: Negative for chills and fever. HENT: Negative for rhinorrhea and sore throat. Eyes: Negative for pain and visual disturbance. Respiratory: Negative for cough and shortness of breath. Cardiovascular: Negative for chest pain and leg swelling. Gastrointestinal: Negative for abdominal pain and vomiting. Endocrine: Negative for polydipsia and polyuria. Genitourinary: Negative for dysuria and urgency. Musculoskeletal: Positive for arthralgias. Negative for back pain. Skin: Negative for color change and pallor. Psychiatric/Behavioral: Negative. Physical Exam     Physical Exam  Vitals and nursing note reviewed. Constitutional:       General: She is not in acute distress. Appearance: Normal appearance. She is not ill-appearing, toxic-appearing or diaphoretic. HENT:      Head: Normocephalic and atraumatic. Mouth/Throat:      Mouth: Mucous membranes are moist.      Pharynx: Oropharynx is clear. Eyes:      Extraocular Movements: Extraocular movements intact. Conjunctiva/sclera: Conjunctivae normal.      Pupils: Pupils are equal, round, and reactive to light. Cardiovascular:      Rate and Rhythm: Normal rate and regular rhythm. Pulses: Normal pulses. Heart sounds: Normal heart sounds. Pulmonary:      Effort: Pulmonary effort is normal.      Breath sounds: Normal breath sounds. Abdominal:      General: Bowel sounds are normal.   Musculoskeletal:         General: Swelling and tenderness present. No deformity. Cervical back: Normal range of motion and neck supple. Right ankle: Swelling present. No deformity. Tenderness present over the lateral malleolus. Decreased range of motion. Skin:     General: Skin is warm and dry. Capillary Refill: Capillary refill takes less than 2 seconds. Neurological:      General: No focal deficit present. Mental Status: She is alert and oriented to person, place, and time.    Psychiatric: Mood and Affect: Mood normal.         Behavior: Behavior normal.         Lab and Diagnostic Study Results     Labs -   No results found for this or any previous visit (from the past 12 hour(s)). Radiologic Studies -   @lastxrresult@  CT Results  (Last 48 hours)    None        CXR Results  (Last 48 hours)    None            Medical Decision Making   - I am the first provider for this patient. - I reviewed the vital signs, available nursing notes, past medical history, past surgical history, family history and social history. - Initial assessment performed. The patients presenting problems have been discussed, and they are in agreement with the care plan formulated and outlined with them. I have encouraged them to ask questions as they arise throughout their visit. Vital Signs-Reviewed the patient's vital signs. Patient Vitals for the past 12 hrs:   Temp Pulse Resp BP SpO2   10/11/21 1124 98.1 °F (36.7 °C) 65 18 (!) 149/62 99 %       Records Reviewed: Nursing Notes    The patient presents with ankle pain with a differential diagnosis of closed fracture, dislocation, ligamentous injury      ED Course:          Provider Notes (Medical Decision Making): MDM       Procedures   Medical Decision Makingedical Decision Making  Performed by: Bard Jacklyn MD  PROCEDURES:  Procedures       Disposition   Disposition: Condition stable and improved  DC- Adult Discharges: All of the diagnostic tests were reviewed and questions answered. Diagnosis, care plan and treatment options were discussed. The patient understands the instructions and will follow up as directed. The patients results have been reviewed with them. They have been counseled regarding their diagnosis. The patient verbally convey understanding and agreement of the signs, symptoms, diagnosis, treatment and prognosis and additionally agrees to follow up as recommended with their PCP in 24 - 48 hours.   They also agree with the care-plan and convey that all of their questions have been answered. I have also put together some discharge instructions for them that include: 1) educational information regarding their diagnosis, 2) how to care for their diagnosis at home, as well a 3) list of reasons why they would want to return to the ED prior to their follow-up appointment, should their condition change. DISCHARGE PLAN:  1. Current Discharge Medication List      CONTINUE these medications which have NOT CHANGED    Details   predniSONE (DELTASONE) 20 mg tablet Take 20 mg by mouth daily (with breakfast). Qty: 5 Tablet, Refills: 0    Associated Diagnoses: Acute non-recurrent maxillary sinusitis      losartan (COZAAR) 25 mg tablet Take 1 Tablet by mouth daily. Qty: 90 Tablet, Refills: 0      metoprolol succinate (TOPROL-XL) 25 mg XL tablet Take 1 Tablet by mouth daily. 2.   Follow-up Information    None       3. Return to ED if worse   4. Current Discharge Medication List            Diagnosis     Clinical Impression: No diagnosis found. Attestations:    Simon Rodriguez MD    Please note that this dictation was completed with [employer], the computer voice recognition software. Quite often unanticipated grammatical, syntax, homophones, and other interpretive errors are inadvertently transcribed by the computer software. Please disregard these errors. Please excuse any errors that have escaped final proofreading. Thank you.

## 2021-10-19 ENCOUNTER — OFFICE VISIT (OUTPATIENT)
Dept: PRIMARY CARE CLINIC | Age: 81
End: 2021-10-19
Payer: MEDICARE

## 2021-10-19 VITALS
DIASTOLIC BLOOD PRESSURE: 78 MMHG | RESPIRATION RATE: 18 BRPM | OXYGEN SATURATION: 98 % | BODY MASS INDEX: 33.08 KG/M2 | TEMPERATURE: 97.6 F | HEART RATE: 73 BPM | WEIGHT: 169.4 LBS | SYSTOLIC BLOOD PRESSURE: 139 MMHG

## 2021-10-19 DIAGNOSIS — J30.89 ENVIRONMENTAL AND SEASONAL ALLERGIES: ICD-10-CM

## 2021-10-19 DIAGNOSIS — J01.11 ACUTE RECURRENT FRONTAL SINUSITIS: Primary | ICD-10-CM

## 2021-10-19 PROCEDURE — 1090F PRES/ABSN URINE INCON ASSESS: CPT | Performed by: NURSE PRACTITIONER

## 2021-10-19 PROCEDURE — G8752 SYS BP LESS 140: HCPCS | Performed by: NURSE PRACTITIONER

## 2021-10-19 PROCEDURE — G8400 PT W/DXA NO RESULTS DOC: HCPCS | Performed by: NURSE PRACTITIONER

## 2021-10-19 PROCEDURE — G8754 DIAS BP LESS 90: HCPCS | Performed by: NURSE PRACTITIONER

## 2021-10-19 PROCEDURE — G8417 CALC BMI ABV UP PARAM F/U: HCPCS | Performed by: NURSE PRACTITIONER

## 2021-10-19 PROCEDURE — G8427 DOCREV CUR MEDS BY ELIG CLIN: HCPCS | Performed by: NURSE PRACTITIONER

## 2021-10-19 PROCEDURE — G8536 NO DOC ELDER MAL SCRN: HCPCS | Performed by: NURSE PRACTITIONER

## 2021-10-19 PROCEDURE — G8432 DEP SCR NOT DOC, RNG: HCPCS | Performed by: NURSE PRACTITIONER

## 2021-10-19 PROCEDURE — 1101F PT FALLS ASSESS-DOCD LE1/YR: CPT | Performed by: NURSE PRACTITIONER

## 2021-10-19 PROCEDURE — 99213 OFFICE O/P EST LOW 20 MIN: CPT | Performed by: NURSE PRACTITIONER

## 2021-10-19 RX ORDER — FLUTICASONE PROPIONATE 50 MCG
2 SPRAY, SUSPENSION (ML) NASAL DAILY
Qty: 1 EACH | Refills: 1 | Status: SHIPPED | OUTPATIENT
Start: 2021-10-19 | End: 2021-11-10

## 2021-10-19 RX ORDER — DOXYCYCLINE 100 MG/1
100 TABLET ORAL 2 TIMES DAILY
Qty: 20 TABLET | Refills: 0 | Status: SHIPPED | OUTPATIENT
Start: 2021-10-19 | End: 2021-10-29

## 2021-10-19 NOTE — PROGRESS NOTES
Leigha Hannah is a 80 y.o. female who presents to the office today for the following:    Chief Complaint   Patient presents with    Sinus Pain       Past Medical History:   Diagnosis Date    Arthritis     Diverticulosis     GERD (gastroesophageal reflux disease)     Hypertension     Tachycardia     Vertigo     Wears glasses        Past Surgical History:   Procedure Laterality Date    HX COLONOSCOPY      HX HYSTERECTOMY      partial        Family History   Problem Relation Age of Onset    Diabetes Mother     Arthritis-osteo Father         Social History     Tobacco Use    Smoking status: Current Every Day Smoker     Packs/day: 0.50    Smokeless tobacco: Never Used   Vaping Use    Vaping Use: Never used   Substance Use Topics    Alcohol use: Yes     Comment: occasional    Drug use: Never        HPI  Patient with PMH of tobbacco dependence, GERD, obesity, hypertension, BPPV and tachycardia who presents with complaint of sinus congestion, headache and cough that started worsening again about 1 week ago. Had been treated for similar symptoms with antibiotics about 4 weeks ago and symptoms mostly resolved until returning again. Denies any fever, chest pain or breathing difficulty. Has been taking some mucinex to help with congestion. Current Outpatient Medications on File Prior to Visit   Medication Sig    losartan (COZAAR) 25 mg tablet Take 1 Tablet by mouth daily.  metoprolol succinate (TOPROL-XL) 25 mg XL tablet Take 1 Tablet by mouth daily. No current facility-administered medications on file prior to visit. Medications Ordered Today   Medications    doxycycline (ADOXA) 100 mg tablet     Sig: Take 1 Tablet by mouth two (2) times a day for 10 days. Dispense:  20 Tablet     Refill:  0    fluticasone propionate (FLONASE) 50 mcg/actuation nasal spray     Si Sprays by Both Nostrils route daily.      Dispense:  1 Each     Refill:  1        Review of Systems   Constitutional: Negative for chills, diaphoresis, fever, malaise/fatigue and weight loss. HENT: Positive for congestion and sinus pain. Negative for ear discharge, ear pain and sore throat. Respiratory: Positive for cough and sputum production. Negative for hemoptysis, shortness of breath and wheezing. Cardiovascular: Negative. Gastrointestinal: Negative. Genitourinary: Negative. Musculoskeletal: Negative. Neurological: Positive for dizziness (occasional ) and headaches. Visit Vitals  /78 (BP 1 Location: Left upper arm, BP Patient Position: Sitting, BP Cuff Size: Adult)   Pulse 73   Temp 97.6 °F (36.4 °C) (Temporal)   Resp 18   Wt 169 lb 6.4 oz (76.8 kg)   SpO2 98%   BMI 33.08 kg/m²       Physical Exam  Vitals and nursing note reviewed. Constitutional:       Appearance: Normal appearance. HENT:      Right Ear: Tympanic membrane normal.      Left Ear: Tympanic membrane normal.      Nose:      Right Sinus: Maxillary sinus tenderness present. Left Sinus: Maxillary sinus tenderness present. Mouth/Throat:      Mouth: Mucous membranes are moist.      Pharynx: Oropharynx is clear. Eyes:      Pupils: Pupils are equal, round, and reactive to light. Cardiovascular:      Rate and Rhythm: Normal rate and regular rhythm. Pulses: Normal pulses. Heart sounds: Normal heart sounds. Pulmonary:      Effort: Pulmonary effort is normal.      Breath sounds: Normal breath sounds. Abdominal:      General: Bowel sounds are normal.      Palpations: Abdomen is soft. Tenderness: There is no abdominal tenderness. Musculoskeletal:         General: Normal range of motion. Skin:     General: Skin is warm and dry. Neurological:      Mental Status: She is alert. Mental status is at baseline.             1. Acute recurrent frontal sinusitis  Discussed checking CT sinuses as this has been recurrent but patient declines  Given worsening and prolonged symptoms, will treat with antibiotics as directed  Recommended using OTC mucinex or sudafed to help clear congestion. Supportive care encouraged including rest, increased oral fluids, cool mist humidifier and Tylenol prn. Advised if symptoms not improving or if any worsening symptoms, follow up with provider  - doxycycline (ADOXA) 100 mg tablet; Take 1 Tablet by mouth two (2) times a day for 10 days. Dispense: 20 Tablet; Refill: 0    2. Environmental and seasonal allergies  Continue flonase and zyrtec    Patient verbalizes understanding of plan of care as discussed above    Follow-up and Dispositions    · Return in about 2 weeks (around 11/2/2021) for or sooner for worsening symptoms.

## 2021-11-10 RX ORDER — FLUTICASONE PROPIONATE 50 MCG
SPRAY, SUSPENSION (ML) NASAL
Qty: 1 EACH | Refills: 1 | Status: SHIPPED | OUTPATIENT
Start: 2021-11-10 | End: 2022-03-10

## 2021-11-22 ENCOUNTER — OFFICE VISIT (OUTPATIENT)
Dept: PRIMARY CARE CLINIC | Age: 81
End: 2021-11-22
Payer: MEDICARE

## 2021-11-22 VITALS
BODY MASS INDEX: 32.59 KG/M2 | TEMPERATURE: 97.6 F | OXYGEN SATURATION: 99 % | RESPIRATION RATE: 20 BRPM | DIASTOLIC BLOOD PRESSURE: 82 MMHG | HEIGHT: 60 IN | HEART RATE: 71 BPM | SYSTOLIC BLOOD PRESSURE: 135 MMHG | WEIGHT: 166 LBS

## 2021-11-22 DIAGNOSIS — J01.11 ACUTE RECURRENT FRONTAL SINUSITIS: Primary | ICD-10-CM

## 2021-11-22 PROCEDURE — G8754 DIAS BP LESS 90: HCPCS | Performed by: NURSE PRACTITIONER

## 2021-11-22 PROCEDURE — G8417 CALC BMI ABV UP PARAM F/U: HCPCS | Performed by: NURSE PRACTITIONER

## 2021-11-22 PROCEDURE — G8752 SYS BP LESS 140: HCPCS | Performed by: NURSE PRACTITIONER

## 2021-11-22 PROCEDURE — 99213 OFFICE O/P EST LOW 20 MIN: CPT | Performed by: NURSE PRACTITIONER

## 2021-11-22 PROCEDURE — G8432 DEP SCR NOT DOC, RNG: HCPCS | Performed by: NURSE PRACTITIONER

## 2021-11-22 PROCEDURE — 1101F PT FALLS ASSESS-DOCD LE1/YR: CPT | Performed by: NURSE PRACTITIONER

## 2021-11-22 PROCEDURE — G8536 NO DOC ELDER MAL SCRN: HCPCS | Performed by: NURSE PRACTITIONER

## 2021-11-22 PROCEDURE — 1090F PRES/ABSN URINE INCON ASSESS: CPT | Performed by: NURSE PRACTITIONER

## 2021-11-22 PROCEDURE — G8400 PT W/DXA NO RESULTS DOC: HCPCS | Performed by: NURSE PRACTITIONER

## 2021-11-22 PROCEDURE — G8427 DOCREV CUR MEDS BY ELIG CLIN: HCPCS | Performed by: NURSE PRACTITIONER

## 2021-11-22 RX ORDER — CETIRIZINE HCL 10 MG
10 TABLET ORAL DAILY
Qty: 30 TABLET | Refills: 2 | Status: SHIPPED | OUTPATIENT
Start: 2021-11-22 | End: 2022-03-10

## 2021-11-22 RX ORDER — LEVOFLOXACIN 500 MG/1
500 TABLET, FILM COATED ORAL DAILY
Qty: 7 TABLET | Refills: 0 | Status: SHIPPED | OUTPATIENT
Start: 2021-11-22 | End: 2021-11-29

## 2021-11-22 NOTE — PROGRESS NOTES
Mily Borja is a 80 y.o. female who presents to the office today for the following:    Chief Complaint   Patient presents with    Nasal Congestion    Cough       Past Medical History:   Diagnosis Date    Arthritis     Diverticulosis     GERD (gastroesophageal reflux disease)     Hypertension     Tachycardia     Vertigo     Wears glasses        Past Surgical History:   Procedure Laterality Date    HX COLONOSCOPY      HX HYSTERECTOMY      partial        Family History   Problem Relation Age of Onset    Diabetes Mother     Arthritis-osteo Father         Social History     Tobacco Use    Smoking status: Current Every Day Smoker     Packs/day: 0.50    Smokeless tobacco: Never Used   Vaping Use    Vaping Use: Never used   Substance Use Topics    Alcohol use: Yes     Comment: occasional    Drug use: Never      HPI  Patient with PMH of tobbacco dependence, GERD, obesity, hypertension, BPPV and tachycardia who presents with complaint of sinus congestion, headache and cough that started worsening again about 2 weeks ago. Had been treated for similar symptoms over the past couple of months and states that about 1-2 weeks after stopping antibiotics, symptoms start again. Denies any fever, chest pain or breathing difficulty. Has been taking some mucinex to help with congestion but not taking the flonase or zyrtec regularly. Current Outpatient Medications on File Prior to Visit   Medication Sig    fluticasone propionate (FLONASE) 50 mcg/actuation nasal spray SPRAY 2 SPRAYS INTO EACH NOSTRIL EVERY DAY    losartan (COZAAR) 25 mg tablet Take 1 Tablet by mouth daily.  metoprolol succinate (TOPROL-XL) 25 mg XL tablet Take 1 Tablet by mouth daily. No current facility-administered medications on file prior to visit. Medications Ordered Today   Medications    levoFLOXacin (LEVAQUIN) 500 mg tablet     Sig: Take 1 Tablet by mouth daily for 7 days.      Dispense:  7 Tablet     Refill:  0    cetirizine (ZYRTEC) 10 mg tablet     Sig: Take 1 Tablet by mouth daily. Dispense:  30 Tablet     Refill:  2        Review of Systems   Constitutional: Negative for chills, diaphoresis, fever, malaise/fatigue and weight loss. HENT: Positive for congestion and sinus pain. Negative for ear discharge, ear pain and sore throat. Respiratory: Positive for cough and sputum production. Negative for hemoptysis, shortness of breath and wheezing. Cardiovascular: Negative. Gastrointestinal: Negative. Genitourinary: Negative. Musculoskeletal: Negative. Neurological: Positive for dizziness (occasional ) and headaches. Visit Vitals  /82   Pulse 71   Temp 97.6 °F (36.4 °C) (Temporal)   Resp 20   Ht 5' (1.524 m)   Wt 166 lb (75.3 kg)   SpO2 99%   BMI 32.42 kg/m²       Physical Exam  Vitals and nursing note reviewed. Constitutional:       Appearance: Normal appearance. HENT:      Right Ear: Tympanic membrane normal.      Left Ear: Tympanic membrane normal.      Nose:      Right Sinus: Maxillary sinus tenderness present. Left Sinus: Maxillary sinus tenderness present. Mouth/Throat:      Mouth: Mucous membranes are moist.      Pharynx: Oropharynx is clear. Eyes:      Pupils: Pupils are equal, round, and reactive to light. Cardiovascular:      Rate and Rhythm: Normal rate and regular rhythm. Pulses: Normal pulses. Heart sounds: Normal heart sounds. Pulmonary:      Effort: Pulmonary effort is normal.      Breath sounds: Normal breath sounds. Abdominal:      General: Bowel sounds are normal.      Palpations: Abdomen is soft. Tenderness: There is no abdominal tenderness. Musculoskeletal:         General: Normal range of motion. Skin:     General: Skin is warm and dry. Neurological:      Mental Status: She is alert. Mental status is at baseline.             1. Acute recurrent frontal sinusitis  Symptoms have been recurrent for 2 months and treated with antibiotics which did improve symptoms temporarily. Recommend checking CT sinuses and referring to ENT to evaluate further  Going to treat with levaquin as treated last with doxycycline  Recommended using OTC mucinex  to help clear congestion. Supportive care encouraged including rest, increased oral fluids, cool mist humidifier and Tylenol prn. Advised if symptoms not improving or if any worsening symptoms, follow up with provider  - CT SINUSES WO CONT; Future  - levoFLOXacin (LEVAQUIN) 500 mg tablet; Take 1 Tablet by mouth daily for 7 days. Dispense: 7 Tablet; Refill: 0  - REFERRAL TO ENT-OTOLARYNGOLOGY  - cetirizine (ZYRTEC) 10 mg tablet; Take 1 Tablet by mouth daily. Dispense: 30 Tablet; Refill: 2    2. Environmental and seasonal allergies  Has not been taking consistently and want her to stay on these daily even after symptoms improve    Patient verbalizes understanding of plan of care as discussed above    Follow-up and Dispositions    · Return if symptoms worsen or fail to improve.

## 2021-12-08 RX ORDER — LOSARTAN POTASSIUM 25 MG/1
TABLET ORAL
Qty: 90 TABLET | Refills: 0 | Status: SHIPPED | OUTPATIENT
Start: 2021-12-08 | End: 2022-04-06 | Stop reason: SDUPTHER

## 2021-12-29 ENCOUNTER — TELEPHONE (OUTPATIENT)
Dept: PRIMARY CARE CLINIC | Age: 81
End: 2021-12-29

## 2021-12-29 NOTE — TELEPHONE ENCOUNTER
----- Message from [facility] sent at 12/29/2021  9:12 AM EST -----  Subject: Appointment Request    Reason for Call: Semi-Routine Cough, Cold Symptoms    QUESTIONS  Type of Appointment? Established Patient  Reason for appointment request? No appointments available during search  Additional Information for Provider? Patient called on 12/29 in regards to   wanting to have an appt with Provider Jonatan Kaufman; even looking at an   appt next week in the morning! Larissa Holder has a cough that started this week,   and if she sneezes/ runny nose or cough she has a weak bladder and unable   to control. She was wondering the next best step or appt for next week? Best to contact Larissa Holder at the below number and no vm.   ---------------------------------------------------------------------------  --------------  CALL BACK INFO  What is the best way for the office to contact you? Do not leave any   message, patient will call back for answer  Preferred Call Back Phone Number? [phone]  ---------------------------------------------------------------------------  --------------  SCRIPT ANSWERS  Relationship to Patient? Self  Are you currently unable to finish sentences due to any difficulty   breathing? No  Are you unable to swallow liquids? No  Are you having fevers (100.4 or greater), chills, or sweats? No  Do you have COPD, asthma or a chronic lung condition? No  Have your symptoms been present for more than 5 days? No  Have you recently (14 days) been seen by a provider for this issue? No  Have you been diagnosed with, awaiting test results for, or told that you   are suspected of having COVID-19 (Coronavirus)? (If patient has tested   negative or was tested as a requirement for work, school, or travel and   not based on symptoms, answer no)? No  Within the past two weeks have you developed any of the following symptoms   (answer no if symptoms have been present longer than 2 weeks or began   more than 2 weeks ago)?  Fever or Chills, Cough, Shortness of breath or   difficulty breathing, Loss of taste or smell, Sore throat, Nasal   congestion, Sneezing or runny nose, Fatigue or generalized body aches   (answer no if pain is specific to a body part e.g. back pain), Diarrhea,   Headache?  Yes

## 2022-01-05 ENCOUNTER — OFFICE VISIT (OUTPATIENT)
Dept: PRIMARY CARE CLINIC | Age: 82
End: 2022-01-05
Payer: MEDICARE

## 2022-01-05 VITALS
DIASTOLIC BLOOD PRESSURE: 69 MMHG | OXYGEN SATURATION: 98 % | BODY MASS INDEX: 32.42 KG/M2 | WEIGHT: 166 LBS | TEMPERATURE: 98.3 F | HEART RATE: 72 BPM | SYSTOLIC BLOOD PRESSURE: 139 MMHG | RESPIRATION RATE: 20 BRPM

## 2022-01-05 DIAGNOSIS — Z20.822 EXPOSURE TO COVID-19 VIRUS: Primary | ICD-10-CM

## 2022-01-05 DIAGNOSIS — J20.9 ACUTE BRONCHITIS, UNSPECIFIED ORGANISM: ICD-10-CM

## 2022-01-05 PROCEDURE — 99213 OFFICE O/P EST LOW 20 MIN: CPT | Performed by: NURSE PRACTITIONER

## 2022-01-05 PROCEDURE — 1101F PT FALLS ASSESS-DOCD LE1/YR: CPT | Performed by: NURSE PRACTITIONER

## 2022-01-05 PROCEDURE — G8417 CALC BMI ABV UP PARAM F/U: HCPCS | Performed by: NURSE PRACTITIONER

## 2022-01-05 PROCEDURE — G8536 NO DOC ELDER MAL SCRN: HCPCS | Performed by: NURSE PRACTITIONER

## 2022-01-05 PROCEDURE — G8432 DEP SCR NOT DOC, RNG: HCPCS | Performed by: NURSE PRACTITIONER

## 2022-01-05 PROCEDURE — G8752 SYS BP LESS 140: HCPCS | Performed by: NURSE PRACTITIONER

## 2022-01-05 PROCEDURE — 1090F PRES/ABSN URINE INCON ASSESS: CPT | Performed by: NURSE PRACTITIONER

## 2022-01-05 PROCEDURE — G8400 PT W/DXA NO RESULTS DOC: HCPCS | Performed by: NURSE PRACTITIONER

## 2022-01-05 PROCEDURE — G8754 DIAS BP LESS 90: HCPCS | Performed by: NURSE PRACTITIONER

## 2022-01-05 PROCEDURE — G8427 DOCREV CUR MEDS BY ELIG CLIN: HCPCS | Performed by: NURSE PRACTITIONER

## 2022-01-05 RX ORDER — PREDNISONE 20 MG/1
TABLET ORAL
Qty: 9 TABLET | Refills: 0 | Status: SHIPPED | OUTPATIENT
Start: 2022-01-05 | End: 2022-01-25 | Stop reason: ALTCHOICE

## 2022-01-05 RX ORDER — MECLIZINE HYDROCHLORIDE 25 MG/1
25 TABLET ORAL
Qty: 30 TABLET | Refills: 0 | Status: SHIPPED | OUTPATIENT
Start: 2022-01-05 | End: 2022-01-15

## 2022-01-05 RX ORDER — DOXYCYCLINE 100 MG/1
100 TABLET ORAL 2 TIMES DAILY
Qty: 20 TABLET | Refills: 0 | Status: SHIPPED | OUTPATIENT
Start: 2022-01-05 | End: 2022-01-15

## 2022-01-10 ENCOUNTER — TELEPHONE (OUTPATIENT)
Dept: BEHAVIORAL/MENTAL HEALTH CLINIC | Age: 82
End: 2022-01-10

## 2022-01-10 ENCOUNTER — TELEPHONE (OUTPATIENT)
Dept: PRIMARY CARE CLINIC | Age: 82
End: 2022-01-10

## 2022-01-10 LAB — SARS-COV-2, NAA: DETECTED

## 2022-01-10 NOTE — TELEPHONE ENCOUNTER
Iraida Ford please send the covid results to patients job. Attention it to [facility] [facility] @   [address] [place] [id number]. Patient did not have phone or fax number.

## 2022-01-11 NOTE — PROGRESS NOTES
Pt was informed of the results yesterday.  Pt is suppose to come by and get a copy it has been printed and is at the

## 2022-01-17 ENCOUNTER — TELEPHONE (OUTPATIENT)
Dept: PRIMARY CARE CLINIC | Age: 82
End: 2022-01-17

## 2022-01-17 DIAGNOSIS — U07.1 COVID-19: Primary | ICD-10-CM

## 2022-01-17 RX ORDER — BENZONATATE 200 MG/1
200 CAPSULE ORAL
Qty: 15 CAPSULE | Refills: 0 | Status: SHIPPED | OUTPATIENT
Start: 2022-01-17 | End: 2022-01-22

## 2022-01-17 NOTE — TELEPHONE ENCOUNTER
Informed patient of new RX for cough sent to [facility]. Patient denied any other symptoms.

## 2022-01-17 NOTE — TELEPHONE ENCOUNTER
As long as she is not having sob, chest pain or fever, will send in cough medicine as the cough can linger after covid infection. Was already treated with antibiotic and prednisone previously. If she has any of the aforementioned then want her scheduled.

## 2022-01-17 NOTE — TELEPHONE ENCOUNTER
----- Message from Hira Doan sent at 1/17/2022 12:07 PM EST -----  Subject: Message to Provider    QUESTIONS  Information for Provider? pt was positive for covid on 1/5 and he has a   lingering cough that comes and goes. he would like to be seen or have   something called in for his cough. please call pt to discuss. pt uses   [address]   ---------------------------------------------------------------------------  --------------  [address]  What is the best way for the office to contact you? OK to leave message on   voicemail  Preferred Call Back Phone Number? [phone]  ---------------------------------------------------------------------------  --------------  SCRIPT ANSWERS  Relationship to Patient? Self  Are you currently unable to finish sentences due to any difficulty   breathing? No  Are you unable to swallow liquids? No  Are you having fevers (100.4 or greater), chills, or sweats? No  Do you have COPD, asthma or a chronic lung condition? No  Have your symptoms been present for more than 5 days? No  Have you recently (14 days) been seen by a provider for this issue?  Yes

## 2022-01-21 ENCOUNTER — TELEPHONE (OUTPATIENT)
Dept: BEHAVIORAL/MENTAL HEALTH CLINIC | Age: 82
End: 2022-01-21

## 2022-01-21 NOTE — TELEPHONE ENCOUNTER
Informed patient if she was asymptomatic she did not need to be retested unless her employer requested it. She stated understanding.

## 2022-01-21 NOTE — TELEPHONE ENCOUNTER
Patient would like to come in Monday to get retested for Covid. Can I put the patient on the schedule for Monday in [place]?

## 2022-01-25 ENCOUNTER — OFFICE VISIT (OUTPATIENT)
Dept: PRIMARY CARE CLINIC | Age: 82
End: 2022-01-25
Payer: MEDICARE

## 2022-01-25 ENCOUNTER — TELEPHONE (OUTPATIENT)
Dept: PRIMARY CARE CLINIC | Age: 82
End: 2022-01-25

## 2022-01-25 VITALS
HEART RATE: 73 BPM | RESPIRATION RATE: 18 BRPM | WEIGHT: 161.8 LBS | DIASTOLIC BLOOD PRESSURE: 55 MMHG | SYSTOLIC BLOOD PRESSURE: 123 MMHG | TEMPERATURE: 97.1 F | BODY MASS INDEX: 31.77 KG/M2 | OXYGEN SATURATION: 98 % | HEIGHT: 60 IN

## 2022-01-25 DIAGNOSIS — R91.1 LESION OF RIGHT LUNG: ICD-10-CM

## 2022-01-25 DIAGNOSIS — U07.1 COVID-19: Primary | ICD-10-CM

## 2022-01-25 DIAGNOSIS — R05.3 PERSISTENT COUGH: ICD-10-CM

## 2022-01-25 PROCEDURE — G8417 CALC BMI ABV UP PARAM F/U: HCPCS | Performed by: NURSE PRACTITIONER

## 2022-01-25 PROCEDURE — G8754 DIAS BP LESS 90: HCPCS | Performed by: NURSE PRACTITIONER

## 2022-01-25 PROCEDURE — G8536 NO DOC ELDER MAL SCRN: HCPCS | Performed by: NURSE PRACTITIONER

## 2022-01-25 PROCEDURE — G8400 PT W/DXA NO RESULTS DOC: HCPCS | Performed by: NURSE PRACTITIONER

## 2022-01-25 PROCEDURE — 1101F PT FALLS ASSESS-DOCD LE1/YR: CPT | Performed by: NURSE PRACTITIONER

## 2022-01-25 PROCEDURE — 99213 OFFICE O/P EST LOW 20 MIN: CPT | Performed by: NURSE PRACTITIONER

## 2022-01-25 PROCEDURE — G8427 DOCREV CUR MEDS BY ELIG CLIN: HCPCS | Performed by: NURSE PRACTITIONER

## 2022-01-25 PROCEDURE — G8432 DEP SCR NOT DOC, RNG: HCPCS | Performed by: NURSE PRACTITIONER

## 2022-01-25 PROCEDURE — 1090F PRES/ABSN URINE INCON ASSESS: CPT | Performed by: NURSE PRACTITIONER

## 2022-01-25 PROCEDURE — G8752 SYS BP LESS 140: HCPCS | Performed by: NURSE PRACTITIONER

## 2022-01-25 RX ORDER — PREDNISONE 20 MG/1
TABLET ORAL
Qty: 9 TABLET | Refills: 0 | Status: SHIPPED | OUTPATIENT
Start: 2022-01-25 | End: 2022-03-10

## 2022-01-25 RX ORDER — ALBUTEROL SULFATE 90 UG/1
1 AEROSOL, METERED RESPIRATORY (INHALATION)
Qty: 18 G | Refills: 1 | Status: SHIPPED | OUTPATIENT
Start: 2022-01-25

## 2022-01-25 RX ORDER — LEVOFLOXACIN 500 MG/1
500 TABLET, FILM COATED ORAL DAILY
Qty: 5 TABLET | Refills: 0 | Status: SHIPPED | OUTPATIENT
Start: 2022-01-25 | End: 2022-01-30

## 2022-01-25 RX ORDER — BENZONATATE 200 MG/1
200 CAPSULE ORAL
Qty: 30 CAPSULE | Refills: 0 | Status: SHIPPED | OUTPATIENT
Start: 2022-01-25 | End: 2022-02-01

## 2022-01-25 NOTE — TELEPHONE ENCOUNTER
Patient's niece left message  stated that patient is still coughing a lot, not feeling any better, aching, left side hurting, just lying around. She is just not feeling well. Please advise.

## 2022-01-26 ENCOUNTER — HOSPITAL ENCOUNTER (OUTPATIENT)
Dept: GENERAL RADIOLOGY | Age: 82
Discharge: HOME | End: 2022-01-26
Payer: MEDICARE

## 2022-01-26 DIAGNOSIS — R05.3 PERSISTENT COUGH: ICD-10-CM

## 2022-01-26 DIAGNOSIS — U07.1 COVID-19: ICD-10-CM

## 2022-01-26 PROCEDURE — 71046 X-RAY EXAM CHEST 2 VIEWS: CPT

## 2022-01-31 NOTE — PROGRESS NOTES
Informed patient of Xray results and recommendations per ISAC Adler NP. Patient stated she feels better. I gave her the number to call in case she missed the call from scheduling. She stated she would call them to get this scheduled. She also wanted to let Ms. Sabiha Walter NP know that her job would not accept her note to [employer] without a Covid test.  She wants to know what she needs to do now?

## 2022-01-31 NOTE — PROGRESS NOTES
Andrew Knapp is a 80 y.o. female who presents to the office today for the following:    Chief Complaint   Patient presents with    Post-COVID Symptoms    Shortness of Breath    Fatigue       Past Medical History:   Diagnosis Date    Arthritis     Diverticulosis     GERD (gastroesophageal reflux disease)     Hypertension     Tachycardia     Vertigo     Wears glasses        Past Surgical History:   Procedure Laterality Date    HX COLONOSCOPY      HX HYSTERECTOMY      partial        Family History   Problem Relation Age of Onset    Diabetes Mother     OSTEOARTHRITIS Father         Social History     Tobacco Use    Smoking status: Former Smoker     Packs/day: 0.50    Smokeless tobacco: Never Used   Vaping Use    Vaping Use: Never used   Substance Use Topics    Alcohol use: Yes     Comment: occasional    Drug use: Never      HPI  Patient with PMH of tobbacco dependence, GERD, obesity, hypertension, BPPV and tachycardia who presents for follow up after diagnosed with covid 19 on 1/5/22. States that she continues with fatigue, shortness of breath, cough, headache and ear pain. Did take the 5 day steroid dose which seemed to help some but then symptoms have worsened again. Cough remains worse at night and has been productive. No coughing up blood. No recent fever or chills. Appetite and activity are decreased. Current Outpatient Medications on File Prior to Visit   Medication Sig    losartan (COZAAR) 25 mg tablet TAKE 1 TABLET BY MOUTH EVERY DAY    cetirizine (ZYRTEC) 10 mg tablet Take 1 Tablet by mouth daily.  fluticasone propionate (FLONASE) 50 mcg/actuation nasal spray SPRAY 2 SPRAYS INTO EACH NOSTRIL EVERY DAY    metoprolol succinate (TOPROL-XL) 25 mg XL tablet Take 1 Tablet by mouth daily. No current facility-administered medications on file prior to visit.         Medications Ordered Today   Medications    levoFLOXacin (LEVAQUIN) 500 mg tablet     Sig: Take 1 Tablet by mouth daily for 5 days. Dispense:  5 Tablet     Refill:  0    predniSONE (DELTASONE) 20 mg tablet     Sig: Take 2 tablets by mouth x 3 days then 1 tablet by mouth x 3 days     Dispense:  9 Tablet     Refill:  0    benzonatate (TESSALON) 200 mg capsule     Sig: Take 1 Capsule by mouth three (3) times daily as needed for Cough for up to 7 days. Dispense:  30 Capsule     Refill:  0    albuterol (PROVENTIL HFA, VENTOLIN HFA, PROAIR HFA) 90 mcg/actuation inhaler     Sig: Take 1 Puff by inhalation every four (4) hours as needed for Wheezing. Dispense:  18 g     Refill:  1        Review of Systems   Constitutional: Negative for chills, diaphoresis, fever, malaise/fatigue and weight loss. HENT: Positive for congestion and ear pain . Negative for ear discharge, sinus pain and sore throat. Respiratory: Positive for cough, shortness of breath and sputum production. Negative for hemoptysis  Cardiovascular: Negative. Gastrointestinal: Negative. Genitourinary: Negative. Musculoskeletal: Negative. Neurological: Positive for dizziness (occasional ) and headaches. Visit Vitals  BP (!) 123/55 (BP 1 Location: Left upper arm, BP Patient Position: Sitting, BP Cuff Size: Adult)   Pulse 73   Temp 97.1 °F (36.2 °C) (Temporal)   Resp 18   Ht 5' (1.524 m)   Wt 161 lb 12.8 oz (73.4 kg)   SpO2 98%   BMI 31.60 kg/m²       Physical Exam  Vitals and nursing note reviewed. Constitutional:       General: She is not in acute distress. Appearance: Normal appearance. She is obese. HENT:      Right Ear: Tympanic membrane normal.      Left Ear: Tympanic membrane normal.      Mouth/Throat:      Pharynx: No oropharyngeal exudate or posterior oropharyngeal erythema. Eyes:      Pupils: Pupils are equal, round, and reactive to light. Cardiovascular:      Rate and Rhythm: Normal rate and regular rhythm. Pulses: Normal pulses. Heart sounds: Normal heart sounds.    Pulmonary:      Effort: Pulmonary effort is normal. No respiratory distress. Breath sounds: Rhonchi (upper airways) present. No wheezing. Abdominal:      General: Bowel sounds are normal.      Palpations: Abdomen is soft. Tenderness: There is no abdominal tenderness. Musculoskeletal:         General: Normal range of motion. Lymphadenopathy:      Cervical: No cervical adenopathy. Skin:     General: Skin is warm and dry. Neurological:      Mental Status: She is alert and oriented to person, place, and time. Mental status is at baseline. 1. COVID-19    - XR CHEST PA LAT; Future  - albuterol (PROVENTIL HFA, VENTOLIN HFA, PROAIR HFA) 90 mcg/actuation inhaler; Take 1 Puff by inhalation every four (4) hours as needed for Wheezing. Dispense: 18 g; Refill: 1    2. Persistent cough    - XR CHEST PA LAT; Future  - levoFLOXacin (LEVAQUIN) 500 mg tablet; Take 1 Tablet by mouth daily for 5 days. Dispense: 5 Tablet; Refill: 0  - predniSONE (DELTASONE) 20 mg tablet; Take 2 tablets by mouth x 3 days then 1 tablet by mouth x 3 days  Dispense: 9 Tablet; Refill: 0  - benzonatate (TESSALON) 200 mg capsule; Take 1 Capsule by mouth three (3) times daily as needed for Cough for up to 7 days. Dispense: 30 Capsule; Refill: 0  - albuterol (PROVENTIL HFA, VENTOLIN HFA, PROAIR HFA) 90 mcg/actuation inhaler; Take 1 Puff by inhalation every four (4) hours as needed for Wheezing.   Dispense: 18 g; Refill: 1    Symptoms not improved since diagnosed with covid 19 on 1/5/22  Check cxr  She has risk factors for potential development of bacterial infection and will start on antibiotic  Also will prescribe prednisone taper, albuterol inhaler prn for wheezing/sob and cough medicine prn  Encouraged to continue other supportive care with rest, increased fluids, tylenol prn for pain or fever and cool mist humidifier  Advised if develops  fever, inability to eat/drink, shortness of breath or other worsening condition, seek emergency care immediately  Re-evaluate in 1 week or sooner if worsening                      Patient verbalizes understanding of plan of care as discussed above    Follow-up and Dispositions    · Return in about 1 week (around 2/1/2022) for or sooner for worsening symptoms.

## 2022-01-31 NOTE — PROGRESS NOTES
Please let patient know that xray is showing non-specific abnormality in the right lung which could be concerning for a lesion or lymph node. We need to order CT of chest w/ contrast to further evaluate. If you will check if symptoms improved and can discuss further once we have CT test results. If any questions before then please let me know.

## 2022-02-01 ENCOUNTER — CLINICAL SUPPORT (OUTPATIENT)
Dept: PRIMARY CARE CLINIC | Age: 82
End: 2022-02-01

## 2022-02-01 DIAGNOSIS — R05.3 PERSISTENT COUGH: Primary | ICD-10-CM

## 2022-02-02 LAB
SARS-COV-2, NAA 2 DAY TAT: NORMAL
SARS-COV-2, NAA: NOT DETECTED

## 2022-02-02 NOTE — PROGRESS NOTES
Please let patient know that repeat covid test for job is negative. If you will provide copy for her to give to job.

## 2022-02-03 NOTE — PROGRESS NOTES
Informed patient of negative result for Covid Test.  Patient stated she would come by and  copy to take to her job today.

## 2022-02-09 ENCOUNTER — HOSPITAL ENCOUNTER (OUTPATIENT)
Dept: CT IMAGING | Age: 82
Discharge: HOME | End: 2022-02-09
Payer: MEDICARE

## 2022-02-09 DIAGNOSIS — R91.1 LESION OF RIGHT LUNG: ICD-10-CM

## 2022-02-09 LAB — CREAT BLD-MCNC: 0.9 MG/DL (ref 0.6–1.3)

## 2022-02-09 PROCEDURE — 82565 ASSAY OF CREATININE: CPT

## 2022-02-09 PROCEDURE — 71260 CT THORAX DX C+: CPT

## 2022-02-09 PROCEDURE — 74011000636 HC RX REV CODE- 636: Performed by: RADIOLOGY

## 2022-02-09 RX ADMIN — IOPAMIDOL 100 ML: 612 INJECTION, SOLUTION INTRAVENOUS at 14:07

## 2022-02-11 DIAGNOSIS — R91.1 LESION OF RIGHT LUNG: Primary | ICD-10-CM

## 2022-02-11 NOTE — PROGRESS NOTES
Spoke with patient via phone regarding results. We are setting up urgent appointment with pulmonology to evaluate ant treat. She would like for us to talk with daughter regarding results and will have them contact office.

## 2022-02-15 ENCOUNTER — TELEPHONE (OUTPATIENT)
Dept: PRIMARY CARE CLINIC | Age: 82
End: 2022-02-15

## 2022-02-15 DIAGNOSIS — R53.83 FATIGUE, UNSPECIFIED TYPE: Primary | ICD-10-CM

## 2022-02-15 NOTE — TELEPHONE ENCOUNTER
Pt daughter would like to discuss the pt.  She has some depression and very short winded, she said she is taking medication and staying in the bed-daughter said it is very unlike her and she is concerned

## 2022-02-15 NOTE — TELEPHONE ENCOUNTER
Have spoke with daughter. Please schedule patient for lab visit and see if she wants to schedule a follow up appointment.

## 2022-02-19 LAB
ALBUMIN SERPL-MCNC: 4.2 G/DL (ref 3.6–4.6)
ALBUMIN/GLOB SERPL: 1.7 {RATIO} (ref 1.2–2.2)
ALP SERPL-CCNC: 75 IU/L (ref 44–121)
ALT SERPL-CCNC: 6 IU/L (ref 0–32)
APPEARANCE UR: ABNORMAL
AST SERPL-CCNC: 13 IU/L (ref 0–40)
BACTERIA #/AREA URNS HPF: ABNORMAL /[HPF]
BASOPHILS # BLD AUTO: 0 X10E3/UL (ref 0–0.2)
BASOPHILS NFR BLD AUTO: 0 %
BILIRUB SERPL-MCNC: 0.6 MG/DL (ref 0–1.2)
BILIRUB UR QL STRIP: NEGATIVE
BUN SERPL-MCNC: 10 MG/DL (ref 8–27)
BUN/CREAT SERPL: 10 (ref 12–28)
CALCIUM SERPL-MCNC: 9.5 MG/DL (ref 8.7–10.3)
CASTS URNS QL MICRO: ABNORMAL /LPF
CHLORIDE SERPL-SCNC: 101 MMOL/L (ref 96–106)
CHOLEST SERPL-MCNC: 169 MG/DL (ref 100–199)
CO2 SERPL-SCNC: 20 MMOL/L (ref 20–29)
COLOR UR: YELLOW
CREAT SERPL-MCNC: 0.96 MG/DL (ref 0.57–1)
CRYSTALS URNS MICRO: ABNORMAL
EOSINOPHIL # BLD AUTO: 0.1 X10E3/UL (ref 0–0.4)
EOSINOPHIL NFR BLD AUTO: 1 %
EPI CELLS #/AREA URNS HPF: ABNORMAL /HPF (ref 0–10)
ERYTHROCYTE [DISTWIDTH] IN BLOOD BY AUTOMATED COUNT: 14.3 % (ref 11.7–15.4)
GLOBULIN SER CALC-MCNC: 2.5 G/DL (ref 1.5–4.5)
GLUCOSE SERPL-MCNC: 81 MG/DL (ref 65–99)
GLUCOSE UR QL STRIP: NEGATIVE
HCT VFR BLD AUTO: 35.5 % (ref 34–46.6)
HDLC SERPL-MCNC: 53 MG/DL
HGB BLD-MCNC: 11.7 G/DL (ref 11.1–15.9)
HGB UR QL STRIP: NEGATIVE
IMM GRANULOCYTES # BLD AUTO: 0 X10E3/UL (ref 0–0.1)
IMM GRANULOCYTES NFR BLD AUTO: 0 %
KETONES UR QL STRIP: ABNORMAL
LDLC SERPL CALC-MCNC: 100 MG/DL (ref 0–99)
LEUKOCYTE ESTERASE UR QL STRIP: ABNORMAL
LYMPHOCYTES # BLD AUTO: 1.8 X10E3/UL (ref 0.7–3.1)
LYMPHOCYTES NFR BLD AUTO: 27 %
MCH RBC QN AUTO: 27.1 PG (ref 26.6–33)
MCHC RBC AUTO-ENTMCNC: 33 G/DL (ref 31.5–35.7)
MCV RBC AUTO: 82 FL (ref 79–97)
MICRO URNS: ABNORMAL
MONOCYTES # BLD AUTO: 0.6 X10E3/UL (ref 0.1–0.9)
MONOCYTES NFR BLD AUTO: 9 %
NEUTROPHILS # BLD AUTO: 4.2 X10E3/UL (ref 1.4–7)
NEUTROPHILS NFR BLD AUTO: 63 %
NITRITE UR QL STRIP: NEGATIVE
PH UR STRIP: 5 [PH] (ref 5–7.5)
PLATELET # BLD AUTO: 269 X10E3/UL (ref 150–450)
POTASSIUM SERPL-SCNC: 4.3 MMOL/L (ref 3.5–5.2)
PROT SERPL-MCNC: 6.7 G/DL (ref 6–8.5)
PROT UR QL STRIP: ABNORMAL
RBC # BLD AUTO: 4.32 X10E6/UL (ref 3.77–5.28)
RBC #/AREA URNS HPF: ABNORMAL /HPF (ref 0–2)
SODIUM SERPL-SCNC: 137 MMOL/L (ref 134–144)
SP GR UR STRIP: 1.03 (ref 1–1.03)
TRIGL SERPL-MCNC: 86 MG/DL (ref 0–149)
UNIDENT CRYS URNS QL MICRO: PRESENT
UROBILINOGEN UR STRIP-MCNC: 1 MG/DL (ref 0.2–1)
VLDLC SERPL CALC-MCNC: 16 MG/DL (ref 5–40)
WBC # BLD AUTO: 6.7 X10E3/UL (ref 3.4–10.8)
WBC #/AREA URNS HPF: ABNORMAL /HPF (ref 0–5)

## 2022-02-21 ENCOUNTER — TELEPHONE (OUTPATIENT)
Dept: BEHAVIORAL/MENTAL HEALTH CLINIC | Age: 82
End: 2022-02-21

## 2022-02-21 NOTE — TELEPHONE ENCOUNTER
Patient daughter called and said that patient need something called in for pain. Please contact patient daughter.

## 2022-02-21 NOTE — TELEPHONE ENCOUNTER
She could take some ibuporfen or aleve w/ food prn as well as alternate with tylenol. She has appointment on 2/28/22 and want her to discuss these symptoms with them. The labs we did were ok but she is supposed to follow up with me after pulmonology visit .

## 2022-02-22 NOTE — TELEPHONE ENCOUNTER
Called and LVM for [facility] stating that per ISAC Walter, NP patient could take Ibuprofen/Aleve with food PRN alternating with Tylenol. To discuss these symptoms with Pulmonologist when she sees them on 02/28/2022. To F/U with Ms. Sabiha Walter NP after that visit. For questions or concerns, to call the office.

## 2022-02-22 NOTE — TELEPHONE ENCOUNTER
Informed patient of lab work results are stable per ISAC Adler NP. Patient had no questions or concerns at this time. Informed her of the recommendations for pain per ISAC Chu NP also. Patient stated understanding.

## 2022-02-23 NOTE — PROGRESS NOTES
Patient has appointment with pulmonology on 2/28/22 which was earliest available. Is going to follow up here on 3/10/22.

## 2022-03-04 ENCOUNTER — HOSPITAL ENCOUNTER (OUTPATIENT)
Dept: PREADMISSION TESTING | Age: 82
Discharge: HOME | End: 2022-03-04
Payer: MEDICARE

## 2022-03-04 ENCOUNTER — HOSPITAL ENCOUNTER (OUTPATIENT)
Dept: LAB | Age: 82
Discharge: HOME | End: 2022-03-04
Payer: MEDICARE

## 2022-03-04 VITALS
WEIGHT: 157.2 LBS | OXYGEN SATURATION: 100 % | HEART RATE: 77 BPM | RESPIRATION RATE: 18 BRPM | TEMPERATURE: 98.6 F | BODY MASS INDEX: 28.93 KG/M2 | HEIGHT: 62 IN | DIASTOLIC BLOOD PRESSURE: 70 MMHG | SYSTOLIC BLOOD PRESSURE: 138 MMHG

## 2022-03-04 LAB
APPEARANCE UR: CLEAR
ATRIAL RATE: 80 BPM
BACTERIA URNS QL MICRO: NEGATIVE /HPF
BILIRUB UR QL: NEGATIVE
CALCULATED P AXIS, ECG09: 19 DEGREES
CALCULATED R AXIS, ECG10: 19 DEGREES
CALCULATED T AXIS, ECG11: 47 DEGREES
COLOR UR: ABNORMAL
DIAGNOSIS, 93000: NORMAL
GLUCOSE UR STRIP.AUTO-MCNC: NEGATIVE MG/DL
HGB UR QL STRIP: NEGATIVE
KETONES UR QL STRIP.AUTO: NEGATIVE MG/DL
LEUKOCYTE ESTERASE UR QL STRIP.AUTO: NEGATIVE
NITRITE UR QL STRIP.AUTO: NEGATIVE
P-R INTERVAL, ECG05: 106 MS
PH UR STRIP: 7 [PH] (ref 5–8)
PROT UR STRIP-MCNC: NEGATIVE MG/DL
Q-T INTERVAL, ECG07: 350 MS
QRS DURATION, ECG06: 64 MS
QTC CALCULATION (BEZET), ECG08: 403 MS
RBC #/AREA URNS HPF: NORMAL /HPF (ref 0–3)
SARS-COV-2, COV2: NORMAL
SP GR UR REFRACTOMETRY: 1.02 (ref 1–1.03)
UROBILINOGEN UR QL STRIP.AUTO: 2 EU/DL (ref 0.2–1)
VENTRICULAR RATE, ECG03: 80 BPM
WBC URNS QL MICRO: NORMAL /HPF (ref 0–5)

## 2022-03-04 PROCEDURE — 93005 ELECTROCARDIOGRAM TRACING: CPT

## 2022-03-04 PROCEDURE — U0005 INFEC AGEN DETEC AMPLI PROBE: HCPCS

## 2022-03-04 PROCEDURE — 81001 URINALYSIS AUTO W/SCOPE: CPT

## 2022-03-04 NOTE — PROGRESS NOTES
LVM stating that Urine test came back ok per ISAC Adler NP. For questions or concerns, patient can call office.

## 2022-03-06 LAB
SARS-COV-2, XPLCVT: NOT DETECTED
SOURCE, COVRS: NORMAL

## 2022-03-08 ENCOUNTER — ANESTHESIA EVENT (OUTPATIENT)
Dept: SURGERY | Age: 82
End: 2022-03-08
Payer: MEDICARE

## 2022-03-08 ENCOUNTER — ANESTHESIA (OUTPATIENT)
Dept: SURGERY | Age: 82
End: 2022-03-08
Payer: MEDICARE

## 2022-03-08 ENCOUNTER — HOSPITAL ENCOUNTER (OUTPATIENT)
Age: 82
Discharge: HOME | End: 2022-03-08
Attending: INTERNAL MEDICINE | Admitting: INTERNAL MEDICINE
Payer: MEDICARE

## 2022-03-08 VITALS
OXYGEN SATURATION: 98 % | RESPIRATION RATE: 16 BRPM | SYSTOLIC BLOOD PRESSURE: 134 MMHG | HEART RATE: 100 BPM | DIASTOLIC BLOOD PRESSURE: 72 MMHG | TEMPERATURE: 98.1 F

## 2022-03-08 PROBLEM — R91.8 LUNG MASS: Status: ACTIVE | Noted: 2022-03-08

## 2022-03-08 PROCEDURE — 77030013079 HC BLNKT BAIR HGGR 3M -A: Performed by: ANESTHESIOLOGY

## 2022-03-08 PROCEDURE — 88305 TISSUE EXAM BY PATHOLOGIST: CPT

## 2022-03-08 PROCEDURE — 74011000250 HC RX REV CODE- 250: Performed by: NURSE ANESTHETIST, CERTIFIED REGISTERED

## 2022-03-08 PROCEDURE — 2709999900 HC NON-CHARGEABLE SUPPLY: Performed by: INTERNAL MEDICINE

## 2022-03-08 PROCEDURE — 76210000026 HC REC RM PH II 1 TO 1.5 HR: Performed by: INTERNAL MEDICINE

## 2022-03-08 PROCEDURE — 88173 CYTOPATH EVAL FNA REPORT: CPT

## 2022-03-08 PROCEDURE — 88172 CYTP DX EVAL FNA 1ST EA SITE: CPT

## 2022-03-08 PROCEDURE — 77030009046 HC CATH BRNCH BLLN OCOA -B: Performed by: INTERNAL MEDICINE

## 2022-03-08 PROCEDURE — 76010000149 HC OR TIME 1 TO 1.5 HR: Performed by: INTERNAL MEDICINE

## 2022-03-08 PROCEDURE — 74011250636 HC RX REV CODE- 250/636: Performed by: NURSE ANESTHETIST, CERTIFIED REGISTERED

## 2022-03-08 PROCEDURE — 74011250636 HC RX REV CODE- 250/636

## 2022-03-08 PROCEDURE — 77030040361 HC SLV COMPR DVT MDII -B: Performed by: INTERNAL MEDICINE

## 2022-03-08 PROCEDURE — 77030026438 HC STYL ET INTUB CARD -A: Performed by: ANESTHESIOLOGY

## 2022-03-08 PROCEDURE — 77030008684 HC TU ET CUF COVD -B: Performed by: ANESTHESIOLOGY

## 2022-03-08 PROCEDURE — 74011000250 HC RX REV CODE- 250: Performed by: INTERNAL MEDICINE

## 2022-03-08 PROCEDURE — 76210000006 HC OR PH I REC 0.5 TO 1 HR: Performed by: INTERNAL MEDICINE

## 2022-03-08 PROCEDURE — 77030012699 HC VLV SUC CNTRL OCOA -A: Performed by: INTERNAL MEDICINE

## 2022-03-08 PROCEDURE — 74011250636 HC RX REV CODE- 250/636: Performed by: INTERNAL MEDICINE

## 2022-03-08 PROCEDURE — 76060000033 HC ANESTHESIA 1 TO 1.5 HR: Performed by: INTERNAL MEDICINE

## 2022-03-08 RX ORDER — LIDOCAINE HYDROCHLORIDE 10 MG/ML
INJECTION INFILTRATION; PERINEURAL AS NEEDED
Status: DISCONTINUED | OUTPATIENT
Start: 2022-03-08 | End: 2022-03-08 | Stop reason: HOSPADM

## 2022-03-08 RX ORDER — MECLIZINE HYDROCHLORIDE 25 MG/1
25 TABLET ORAL
COMMUNITY
End: 2022-04-06 | Stop reason: SDUPTHER

## 2022-03-08 RX ORDER — ONDANSETRON 2 MG/ML
INJECTION INTRAMUSCULAR; INTRAVENOUS AS NEEDED
Status: DISCONTINUED | OUTPATIENT
Start: 2022-03-08 | End: 2022-03-08 | Stop reason: HOSPADM

## 2022-03-08 RX ORDER — SODIUM CHLORIDE, SODIUM LACTATE, POTASSIUM CHLORIDE, CALCIUM CHLORIDE 600; 310; 30; 20 MG/100ML; MG/100ML; MG/100ML; MG/100ML
20 INJECTION, SOLUTION INTRAVENOUS CONTINUOUS
Status: DISCONTINUED | OUTPATIENT
Start: 2022-03-08 | End: 2022-03-08 | Stop reason: HOSPADM

## 2022-03-08 RX ORDER — BENZONATATE 200 MG/1
200 CAPSULE ORAL
COMMUNITY

## 2022-03-08 RX ORDER — PROPOFOL 10 MG/ML
INJECTION, EMULSION INTRAVENOUS AS NEEDED
Status: DISCONTINUED | OUTPATIENT
Start: 2022-03-08 | End: 2022-03-08 | Stop reason: HOSPADM

## 2022-03-08 RX ORDER — SUCCINYLCHOLINE CHLORIDE 20 MG/ML
INJECTION INTRAMUSCULAR; INTRAVENOUS AS NEEDED
Status: DISCONTINUED | OUTPATIENT
Start: 2022-03-08 | End: 2022-03-08 | Stop reason: HOSPADM

## 2022-03-08 RX ORDER — SODIUM CHLORIDE, SODIUM LACTATE, POTASSIUM CHLORIDE, CALCIUM CHLORIDE 600; 310; 30; 20 MG/100ML; MG/100ML; MG/100ML; MG/100ML
INJECTION, SOLUTION INTRAVENOUS
Status: DISCONTINUED | OUTPATIENT
Start: 2022-03-08 | End: 2022-03-08 | Stop reason: HOSPADM

## 2022-03-08 RX ORDER — DEXAMETHASONE SODIUM PHOSPHATE 4 MG/ML
INJECTION, SOLUTION INTRA-ARTICULAR; INTRALESIONAL; INTRAMUSCULAR; INTRAVENOUS; SOFT TISSUE AS NEEDED
Status: DISCONTINUED | OUTPATIENT
Start: 2022-03-08 | End: 2022-03-08 | Stop reason: HOSPADM

## 2022-03-08 RX ORDER — LIDOCAINE HYDROCHLORIDE 20 MG/ML
INJECTION, SOLUTION EPIDURAL; INFILTRATION; INTRACAUDAL; PERINEURAL AS NEEDED
Status: DISCONTINUED | OUTPATIENT
Start: 2022-03-08 | End: 2022-03-08 | Stop reason: HOSPADM

## 2022-03-08 RX ORDER — ROCURONIUM BROMIDE 10 MG/ML
INJECTION, SOLUTION INTRAVENOUS AS NEEDED
Status: DISCONTINUED | OUTPATIENT
Start: 2022-03-08 | End: 2022-03-08 | Stop reason: HOSPADM

## 2022-03-08 RX ORDER — FENTANYL CITRATE 50 UG/ML
INJECTION, SOLUTION INTRAMUSCULAR; INTRAVENOUS AS NEEDED
Status: DISCONTINUED | OUTPATIENT
Start: 2022-03-08 | End: 2022-03-08 | Stop reason: HOSPADM

## 2022-03-08 RX ADMIN — PHENYLEPHRINE HYDROCHLORIDE 200 MCG: 10 INJECTION INTRAVENOUS at 11:04

## 2022-03-08 RX ADMIN — ONDANSETRON 4 MG: 2 INJECTION INTRAMUSCULAR; INTRAVENOUS at 10:56

## 2022-03-08 RX ADMIN — ROCURONIUM BROMIDE 10 MG: 50 INJECTION, SOLUTION INTRAVENOUS at 10:50

## 2022-03-08 RX ADMIN — SUCCINYLCHOLINE CHLORIDE 100 MG: 20 INJECTION, SOLUTION INTRAMUSCULAR; INTRAVENOUS at 10:50

## 2022-03-08 RX ADMIN — PHENYLEPHRINE HYDROCHLORIDE 100 MCG: 10 INJECTION INTRAVENOUS at 11:17

## 2022-03-08 RX ADMIN — LIDOCAINE HYDROCHLORIDE 100 MG: 20 INJECTION, SOLUTION EPIDURAL; INFILTRATION; INTRACAUDAL; PERINEURAL at 10:48

## 2022-03-08 RX ADMIN — PROPOFOL 130 MG: 10 INJECTION, EMULSION INTRAVENOUS at 10:49

## 2022-03-08 RX ADMIN — FENTANYL CITRATE 50 MCG: 50 INJECTION, SOLUTION INTRAMUSCULAR; INTRAVENOUS at 11:46

## 2022-03-08 RX ADMIN — SODIUM CHLORIDE, POTASSIUM CHLORIDE, SODIUM LACTATE AND CALCIUM CHLORIDE: 600; 310; 30; 20 INJECTION, SOLUTION INTRAVENOUS at 10:30

## 2022-03-08 RX ADMIN — PHENYLEPHRINE HYDROCHLORIDE 100 MCG: 10 INJECTION INTRAVENOUS at 11:09

## 2022-03-08 RX ADMIN — FENTANYL CITRATE 50 MCG: 50 INJECTION, SOLUTION INTRAMUSCULAR; INTRAVENOUS at 10:58

## 2022-03-08 RX ADMIN — DEXAMETHASONE SODIUM PHOSPHATE 8 MG: 4 INJECTION, SOLUTION INTRA-ARTICULAR; INTRALESIONAL; INTRAMUSCULAR; INTRAVENOUS; SOFT TISSUE at 10:56

## 2022-03-08 RX ADMIN — SODIUM CHLORIDE, POTASSIUM CHLORIDE, SODIUM LACTATE AND CALCIUM CHLORIDE 20 ML/HR: 600; 310; 30; 20 INJECTION, SOLUTION INTRAVENOUS at 09:41

## 2022-03-08 NOTE — ANESTHESIA PREPROCEDURE EVALUATION
Relevant Problems   CARDIOVASCULAR   (+) Hypertensive disorder      GASTROINTESTINAL   (+) GERD (gastroesophageal reflux disease)       Anesthetic History               Review of Systems / Medical History  Patient summary reviewed, nursing notes reviewed and pertinent labs reviewed    Pulmonary    COPD            Comments: MEDIASTINAL ADENOPATHY. FORMER SMOKER. Neuro/Psych             Comments: HEARING LOSS. Cardiovascular    Hypertension                   GI/Hepatic/Renal     GERD           Endo/Other        Arthritis     Other Findings            Physical Exam    Airway  Mallampati: III  TM Distance: 4 - 6 cm  Neck ROM: normal range of motion   Mouth opening: Normal     Cardiovascular    Rhythm: regular  Rate: normal         Dental      Comments: MULTIPLE MISSING TEETH.     Pulmonary  Breath sounds clear to auscultation               Abdominal  GI exam deferred       Other Findings            Anesthetic Plan    ASA: 3  Anesthesia type: general          Induction: Intravenous  Anesthetic plan and risks discussed with: Patient

## 2022-03-08 NOTE — ANESTHESIA POSTPROCEDURE EVALUATION
Procedure(s):  ENDOSCOPIC BRONCHOSCOPY ULTRASOUND (EBUS) WITH BIOPSY.     general    Anesthesia Post Evaluation        Patient location during evaluation: PACU  Patient participation: complete - patient participated  Level of consciousness: awake  Pain score: 0  Pain management: adequate  Airway patency: patent  Anesthetic complications: no  Cardiovascular status: acceptable  Respiratory status: acceptable  Hydration status: acceptable  Post anesthesia nausea and vomiting:  controlled  Final Post Anesthesia Temperature Assessment:  Normothermia (36.0-37.5 degrees C)      INITIAL Post-op Vital signs:   Vitals Value Taken Time   /73 03/08/22 1200   Temp 37.1 °C (98.8 °F) 03/08/22 1150   Pulse 128 03/08/22 1200   Resp 20 03/08/22 1200   SpO2 98 % 03/08/22 1200

## 2022-03-08 NOTE — PROCEDURES
[facility]  Pulmonary, Critical Care, and Sleep Medicine    Name: Yaquelin Perez MRN: [id number]   : 1940 Hospital: [facility]   Date: 3/8/2022        Bronchoscopy with Endobronchial Ultrasound-Guided Transbronchial Needle Aspiration    Procedure: Diagnostic EBUS bronchoscopy. Indication: Abnormal chest imaging, mediastinal adenopathy, lung mass    Consent/Treatment: Informed consent was obtained from the  patient after risks, benefits and alternatives were explained. Timeout verified the correct patient and correct procedure. Anesthesia:   General anesthesia was performed by anesthesiology    Procedure Details:   -- The bronchoscope was introduced through an endotracheal tube. -- The vocal cords were not examined. -- The trachea was examined and found to be relatively unremarkable, the saumya was splayed with evidence of cobblestoning, especially down the right mainstem bronchus. -- The right-sided endobronchial anatomy was completely inspected and found to be abnormal.  There was evidence of cobblestoning down the medial aspect of the right mainstem bronchus as well as extrinsic compression of the right upper lobe takeoff as well as the bronchus intermedius but all of the lobes appear to be patent. -- The left-sided endobronchial anatomy was completely inspected and was found to be normal.     The bronchoscope was then withdrawn and an EBUS bronchoscope was introduced into the trachea.  Mediastinal inspection revealed:    STATION SIZE TBNA   2L     2R      4L     4R     7 Large 4 passes   10L     10R     11L     11R     Right infrahilar mass Large 5 passes     Specimens:   Transbronchial needle aspiration from station 7 and right infrahilar mass was sent for  cytology    Rapid On-Site Evaluation: A preliminary diagnosis of neoplastic cells (unknown type at this time)    Complications: none    Estimated Blood Loss: less than 50     Plan:   -Okay to discharge home once no longer requiring supplemental oxygen once tolerating p.o.  -We will call the patient with the results in 24 to 48 hours  -Likely will need medical oncology/radiation oncology referrals, PET/CT scan, and MRI of the brain      Victor M Rojas DO   [facility] ([facility])

## 2022-03-08 NOTE — ROUTINE PROCESS
TRANSFER - OUT REPORT:    Verbal report given to Dmitry Trent on [employer]  being transferred to [facility] bed 25 for     Report consisted of patients Situation, Background, Assessment and   Recommendations(SBAR).

## 2022-03-10 ENCOUNTER — OFFICE VISIT (OUTPATIENT)
Dept: PRIMARY CARE CLINIC | Age: 82
End: 2022-03-10
Payer: MEDICARE

## 2022-03-10 ENCOUNTER — TELEPHONE (OUTPATIENT)
Dept: PRIMARY CARE CLINIC | Age: 82
End: 2022-03-10

## 2022-03-10 VITALS
BODY MASS INDEX: 29.68 KG/M2 | RESPIRATION RATE: 18 BRPM | HEART RATE: 69 BPM | TEMPERATURE: 96.9 F | DIASTOLIC BLOOD PRESSURE: 65 MMHG | OXYGEN SATURATION: 98 % | WEIGHT: 157.2 LBS | HEIGHT: 61 IN | SYSTOLIC BLOOD PRESSURE: 134 MMHG

## 2022-03-10 DIAGNOSIS — C34.90 SMALL CELL LUNG CANCER (HCC): Primary | ICD-10-CM

## 2022-03-10 DIAGNOSIS — F32.A DEPRESSION, UNSPECIFIED DEPRESSION TYPE: Primary | ICD-10-CM

## 2022-03-10 DIAGNOSIS — R63.0 DECREASED APPETITE: ICD-10-CM

## 2022-03-10 DIAGNOSIS — I10 PRIMARY HYPERTENSION: ICD-10-CM

## 2022-03-10 DIAGNOSIS — R63.0 LOSS OF APPETITE: ICD-10-CM

## 2022-03-10 DIAGNOSIS — J44.9 CHRONIC OBSTRUCTIVE PULMONARY DISEASE, UNSPECIFIED COPD TYPE (HCC): ICD-10-CM

## 2022-03-10 DIAGNOSIS — M19.90 ARTHRITIS: ICD-10-CM

## 2022-03-10 DIAGNOSIS — F32.A DEPRESSION, UNSPECIFIED DEPRESSION TYPE: ICD-10-CM

## 2022-03-10 DIAGNOSIS — F17.200 TOBACCO DEPENDENCE: ICD-10-CM

## 2022-03-10 DIAGNOSIS — K21.9 GASTROESOPHAGEAL REFLUX DISEASE WITHOUT ESOPHAGITIS: ICD-10-CM

## 2022-03-10 PROCEDURE — 1090F PRES/ABSN URINE INCON ASSESS: CPT | Performed by: NURSE PRACTITIONER

## 2022-03-10 PROCEDURE — G8536 NO DOC ELDER MAL SCRN: HCPCS | Performed by: NURSE PRACTITIONER

## 2022-03-10 PROCEDURE — G8752 SYS BP LESS 140: HCPCS | Performed by: NURSE PRACTITIONER

## 2022-03-10 PROCEDURE — G8400 PT W/DXA NO RESULTS DOC: HCPCS | Performed by: NURSE PRACTITIONER

## 2022-03-10 PROCEDURE — G8432 DEP SCR NOT DOC, RNG: HCPCS | Performed by: NURSE PRACTITIONER

## 2022-03-10 PROCEDURE — G8754 DIAS BP LESS 90: HCPCS | Performed by: NURSE PRACTITIONER

## 2022-03-10 PROCEDURE — G8427 DOCREV CUR MEDS BY ELIG CLIN: HCPCS | Performed by: NURSE PRACTITIONER

## 2022-03-10 PROCEDURE — 99214 OFFICE O/P EST MOD 30 MIN: CPT | Performed by: NURSE PRACTITIONER

## 2022-03-10 PROCEDURE — 1101F PT FALLS ASSESS-DOCD LE1/YR: CPT | Performed by: NURSE PRACTITIONER

## 2022-03-10 PROCEDURE — G8417 CALC BMI ABV UP PARAM F/U: HCPCS | Performed by: NURSE PRACTITIONER

## 2022-03-10 RX ORDER — CYPROHEPTADINE HYDROCHLORIDE 4 MG/1
4 TABLET ORAL
Qty: 90 TABLET | Refills: 0 | Status: SHIPPED | OUTPATIENT
Start: 2022-03-10

## 2022-03-10 RX ORDER — SERTRALINE HYDROCHLORIDE 25 MG/1
25 TABLET, FILM COATED ORAL DAILY
Qty: 90 TABLET | Refills: 0 | Status: SHIPPED | OUTPATIENT
Start: 2022-03-10 | End: 2022-04-15

## 2022-03-10 NOTE — PROGRESS NOTES
Chief Complaint   Patient presents with    Surgical Follow-up     1. Have you been to the ER, urgent care clinic since your last visit? Hospitalized since your last visit? yes in chart     2. Have you seen or consulted any other health care providers outside of the [address] [place] [address] since your last visit? Include any pap smears or colon screening.  No

## 2022-03-11 ENCOUNTER — TRANSCRIBE ORDER (OUTPATIENT)
Dept: SCHEDULING | Age: 82
End: 2022-03-11

## 2022-03-11 DIAGNOSIS — C34.90 SMALL CELL CARCINOMA OF LUNG (HCC): Primary | ICD-10-CM

## 2022-03-17 ENCOUNTER — TELEPHONE (OUTPATIENT)
Dept: PRIMARY CARE CLINIC | Age: 82
End: 2022-03-17

## 2022-03-17 NOTE — TELEPHONE ENCOUNTER
Several attempts were made to reach patient re: her question about medication for constipation but were unsuccessful. She did have an appointment scheduled for 03/16/2022 but did not come. Spoke with patient today and she stated she was fine. She took a little milk of magnesia and it worked well.

## 2022-03-17 NOTE — TELEPHONE ENCOUNTER
----- Message from Jose Elmore sent at 3/14/2022  1:35 PM EDT -----  Subject: Message to Provider    QUESTIONS  Information for Provider? Constipation , needs to know what to take ,   being seen on Wednesday  ---------------------------------------------------------------------------  --------------  CALL BACK INFO  What is the best way for the office to contact you? OK to leave message on   voicemail  Preferred Call Back Phone Number? [phone]  ---------------------------------------------------------------------------  --------------  SCRIPT ANSWERS  Relationship to Patient? Third Party  Representative Name?  Kimberly Solares

## 2022-03-18 PROBLEM — I10 HYPERTENSIVE DISORDER: Status: ACTIVE | Noted: 2020-08-28

## 2022-03-18 PROBLEM — Z91.81 AT HIGH RISK FOR FALLS: Status: ACTIVE | Noted: 2021-02-07

## 2022-03-19 PROBLEM — K21.9 GERD (GASTROESOPHAGEAL REFLUX DISEASE): Status: ACTIVE | Noted: 2020-08-28

## 2022-03-19 PROBLEM — R91.8 LUNG MASS: Status: ACTIVE | Noted: 2022-03-08
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
## 2022-03-21 ENCOUNTER — HOSPITAL ENCOUNTER (OUTPATIENT)
Dept: PET IMAGING | Age: 82
Discharge: HOME | End: 2022-03-21
Attending: INTERNAL MEDICINE
Payer: MEDICARE

## 2022-03-21 DIAGNOSIS — C34.90 SMALL CELL CARCINOMA OF LUNG (HCC): ICD-10-CM

## 2022-03-21 PROCEDURE — A9552 F18 FDG: HCPCS

## 2022-03-21 RX ORDER — FLUDEOXYGLUCOSE F-18 200 MCI/ML
10.06 INJECTION INTRAVENOUS ONCE
Status: COMPLETED | OUTPATIENT
Start: 2022-03-21 | End: 2022-03-21

## 2022-03-21 RX ADMIN — FLUDEOXYGLUCOSE F-18 10.06 MILLICURIE: 200 INJECTION INTRAVENOUS at 14:00

## 2022-03-25 ENCOUNTER — HOSPITAL ENCOUNTER (OUTPATIENT)
Dept: MRI IMAGING | Age: 82
Discharge: HOME | End: 2022-03-25
Attending: INTERNAL MEDICINE
Payer: MEDICARE

## 2022-03-25 DIAGNOSIS — C34.90 SMALL CELL CARCINOMA OF LUNG (HCC): ICD-10-CM

## 2022-03-25 PROCEDURE — A9577 INJ MULTIHANCE: HCPCS | Performed by: INTERNAL MEDICINE

## 2022-03-25 PROCEDURE — 70553 MRI BRAIN STEM W/O & W/DYE: CPT

## 2022-03-25 PROCEDURE — 74011250636 HC RX REV CODE- 250/636: Performed by: INTERNAL MEDICINE

## 2022-03-25 RX ADMIN — GADOBENATE DIMEGLUMINE 14 ML: 529 INJECTION, SOLUTION INTRAVENOUS at 13:33

## 2022-03-29 ENCOUNTER — HOSPITAL ENCOUNTER (OUTPATIENT)
Dept: RADIATION THERAPY | Age: 82
Discharge: HOME | End: 2022-03-29

## 2022-03-31 ENCOUNTER — HOSPITAL ENCOUNTER (OUTPATIENT)
Dept: INTERVENTIONAL RADIOLOGY/VASCULAR | Age: 82
Discharge: HOME | End: 2022-03-31
Attending: INTERNAL MEDICINE | Admitting: INTERNAL MEDICINE
Payer: MEDICARE

## 2022-03-31 VITALS
WEIGHT: 156 LBS | HEART RATE: 86 BPM | BODY MASS INDEX: 25.07 KG/M2 | HEIGHT: 66 IN | OXYGEN SATURATION: 97 % | RESPIRATION RATE: 16 BRPM | SYSTOLIC BLOOD PRESSURE: 118 MMHG | DIASTOLIC BLOOD PRESSURE: 86 MMHG | TEMPERATURE: 98.5 F

## 2022-03-31 DIAGNOSIS — C34.81 CANCER OF OVERLAPPING SITES OF RIGHT LUNG (HCC): ICD-10-CM

## 2022-03-31 DIAGNOSIS — C80.1 SMALL CELL CARCINOMA (HCC): ICD-10-CM

## 2022-03-31 LAB
ANION GAP SERPL CALC-SCNC: 8 MMOL/L (ref 5–15)
BASOPHILS # BLD: 0 K/UL (ref 0–0.1)
BASOPHILS NFR BLD: 0 % (ref 0–1)
BUN SERPL-MCNC: 15 MG/DL (ref 6–20)
BUN/CREAT SERPL: 18 (ref 12–20)
CA-I BLD-MCNC: 9.5 MG/DL (ref 8.5–10.1)
CHLORIDE SERPL-SCNC: 104 MMOL/L (ref 97–108)
CO2 SERPL-SCNC: 25 MMOL/L (ref 21–32)
CREAT SERPL-MCNC: 0.82 MG/DL (ref 0.55–1.02)
DIFFERENTIAL METHOD BLD: ABNORMAL
EOSINOPHIL # BLD: 0.1 K/UL (ref 0–0.4)
EOSINOPHIL NFR BLD: 1 % (ref 0–7)
ERYTHROCYTE [DISTWIDTH] IN BLOOD BY AUTOMATED COUNT: 13.9 % (ref 11.5–14.5)
GLUCOSE SERPL-MCNC: 89 MG/DL (ref 65–100)
HCT VFR BLD AUTO: 30.5 % (ref 35–47)
HGB BLD-MCNC: 9.7 G/DL (ref 11.5–16)
IMM GRANULOCYTES # BLD AUTO: 0 K/UL (ref 0–0.04)
IMM GRANULOCYTES NFR BLD AUTO: 0 % (ref 0–0.5)
INR PPP: 1.1 (ref 0.9–1.1)
LYMPHOCYTES # BLD: 2 K/UL (ref 0.8–3.5)
LYMPHOCYTES NFR BLD: 26 % (ref 12–49)
MCH RBC QN AUTO: 26 PG (ref 26–34)
MCHC RBC AUTO-ENTMCNC: 31.8 G/DL (ref 30–36.5)
MCV RBC AUTO: 81.8 FL (ref 80–99)
MONOCYTES # BLD: 0.6 K/UL (ref 0–1)
MONOCYTES NFR BLD: 8 % (ref 5–13)
NEUTS SEG # BLD: 4.7 K/UL (ref 1.8–8)
NEUTS SEG NFR BLD: 65 % (ref 32–75)
NRBC # BLD: 0 K/UL (ref 0–0.01)
NRBC BLD-RTO: 0 PER 100 WBC
PLATELET # BLD AUTO: 510 K/UL (ref 150–400)
PMV BLD AUTO: 9.7 FL (ref 8.9–12.9)
POTASSIUM SERPL-SCNC: 4 MMOL/L (ref 3.5–5.1)
PROTHROMBIN TIME: 14 SEC (ref 11.9–14.6)
RBC # BLD AUTO: 3.73 M/UL (ref 3.8–5.2)
SODIUM SERPL-SCNC: 137 MMOL/L (ref 136–145)
WBC # BLD AUTO: 7.4 K/UL (ref 3.6–11)

## 2022-03-31 PROCEDURE — C1788 PORT, INDWELLING, IMP: HCPCS

## 2022-03-31 PROCEDURE — 99152 MOD SED SAME PHYS/QHP 5/>YRS: CPT

## 2022-03-31 PROCEDURE — 36561 INSERT TUNNELED CV CATH: CPT

## 2022-03-31 PROCEDURE — 80048 BASIC METABOLIC PNL TOTAL CA: CPT

## 2022-03-31 PROCEDURE — 99153 MOD SED SAME PHYS/QHP EA: CPT

## 2022-03-31 PROCEDURE — 85610 PROTHROMBIN TIME: CPT

## 2022-03-31 PROCEDURE — 74011250636 HC RX REV CODE- 250/636: Performed by: INTERNAL MEDICINE

## 2022-03-31 PROCEDURE — 76937 US GUIDE VASCULAR ACCESS: CPT

## 2022-03-31 PROCEDURE — 77001 FLUOROGUIDE FOR VEIN DEVICE: CPT

## 2022-03-31 PROCEDURE — 36415 COLL VENOUS BLD VENIPUNCTURE: CPT

## 2022-03-31 PROCEDURE — 85025 COMPLETE CBC W/AUTO DIFF WBC: CPT

## 2022-03-31 RX ORDER — VANCOMYCIN HYDROCHLORIDE 500 MG/10ML
1 INJECTION, POWDER, LYOPHILIZED, FOR SOLUTION INTRAVENOUS
Status: DISCONTINUED | OUTPATIENT
Start: 2022-03-31 | End: 2022-03-31 | Stop reason: SDUPTHER

## 2022-03-31 RX ORDER — FENTANYL CITRATE 50 UG/ML
25-100 INJECTION, SOLUTION INTRAMUSCULAR; INTRAVENOUS
Status: DISCONTINUED | OUTPATIENT
Start: 2022-03-31 | End: 2022-04-09 | Stop reason: HOSPADM

## 2022-03-31 RX ORDER — MIDAZOLAM HYDROCHLORIDE 1 MG/ML
.5-2 INJECTION, SOLUTION INTRAMUSCULAR; INTRAVENOUS
Status: DISCONTINUED | OUTPATIENT
Start: 2022-03-31 | End: 2022-04-09 | Stop reason: HOSPADM

## 2022-03-31 RX ADMIN — FENTANYL CITRATE 100 MCG: 50 INJECTION, SOLUTION INTRAMUSCULAR; INTRAVENOUS at 12:21

## 2022-03-31 RX ADMIN — VANCOMYCIN HYDROCHLORIDE 1000 MG: 500 INJECTION, POWDER, LYOPHILIZED, FOR SOLUTION INTRAVENOUS at 11:55

## 2022-03-31 RX ADMIN — VANCOMYCIN HYDROCHLORIDE 1000 MG: 1 INJECTION, POWDER, LYOPHILIZED, FOR SOLUTION INTRAVENOUS at 11:49

## 2022-03-31 RX ADMIN — MIDAZOLAM 1 MG: 1 INJECTION INTRAMUSCULAR; INTRAVENOUS at 12:05

## 2022-03-31 RX ADMIN — MIDAZOLAM 1 MG: 1 INJECTION INTRAMUSCULAR; INTRAVENOUS at 11:49

## 2022-03-31 RX ADMIN — FENTANYL CITRATE 50 MCG: 50 INJECTION, SOLUTION INTRAMUSCULAR; INTRAVENOUS at 11:49

## 2022-03-31 RX ADMIN — FENTANYL CITRATE 50 MCG: 50 INJECTION, SOLUTION INTRAMUSCULAR; INTRAVENOUS at 12:05

## 2022-04-04 ENCOUNTER — TELEPHONE (OUTPATIENT)
Dept: PRIMARY CARE CLINIC | Age: 82
End: 2022-04-04

## 2022-04-05 NOTE — TELEPHONE ENCOUNTER
Due to the insurance, they usually require a visit specifically asking for that medical equipment. If you will see where they can put her next week.

## 2022-04-05 NOTE — TELEPHONE ENCOUNTER
Daughter is requesting [address] and a wheelchair for patient. She was last seen by you 03/2022. For you to order these things, you would need to see the patient to find out exactly what she needs, right? I wanted to check with you before I called the daughter back. Thank you.

## 2022-04-06 DIAGNOSIS — I10 PRIMARY HYPERTENSION: ICD-10-CM

## 2022-04-06 DIAGNOSIS — R42 VERTIGO: Primary | ICD-10-CM

## 2022-04-06 RX ORDER — LOSARTAN POTASSIUM 25 MG/1
25 TABLET ORAL DAILY
Qty: 90 TABLET | Refills: 0 | Status: SHIPPED | OUTPATIENT
Start: 2022-04-06 | End: 2022-08-08 | Stop reason: SDUPTHER

## 2022-04-06 RX ORDER — MECLIZINE HYDROCHLORIDE 25 MG/1
25 TABLET ORAL
Qty: 30 TABLET | Refills: 0 | Status: SHIPPED | OUTPATIENT
Start: 2022-04-06 | End: 2022-09-11

## 2022-04-06 RX ORDER — METOPROLOL SUCCINATE 25 MG/1
12.5 TABLET, EXTENDED RELEASE ORAL DAILY
Qty: 90 TABLET | Refills: 0 | Status: SHIPPED | OUTPATIENT
Start: 2022-04-06 | End: 2022-08-08 | Stop reason: SDUPTHER

## 2022-04-15 ENCOUNTER — OFFICE VISIT (OUTPATIENT)
Dept: PRIMARY CARE CLINIC | Age: 82
End: 2022-04-15
Payer: MEDICARE

## 2022-04-15 VITALS
HEART RATE: 75 BPM | DIASTOLIC BLOOD PRESSURE: 68 MMHG | SYSTOLIC BLOOD PRESSURE: 133 MMHG | TEMPERATURE: 97.2 F | RESPIRATION RATE: 18 BRPM | HEIGHT: 66 IN | WEIGHT: 154.4 LBS | BODY MASS INDEX: 24.81 KG/M2 | OXYGEN SATURATION: 98 %

## 2022-04-15 DIAGNOSIS — I10 PRIMARY HYPERTENSION: ICD-10-CM

## 2022-04-15 DIAGNOSIS — C34.81 CANCER OF OVERLAPPING SITES OF RIGHT LUNG (HCC): ICD-10-CM

## 2022-04-15 DIAGNOSIS — R63.0 DECREASED APPETITE: ICD-10-CM

## 2022-04-15 DIAGNOSIS — Z91.81 AT HIGH RISK FOR FALLS: ICD-10-CM

## 2022-04-15 DIAGNOSIS — Z74.09 IMPAIRED MOBILITY AND ENDURANCE: Primary | ICD-10-CM

## 2022-04-15 DIAGNOSIS — J44.9 CHRONIC OBSTRUCTIVE PULMONARY DISEASE, UNSPECIFIED COPD TYPE (HCC): ICD-10-CM

## 2022-04-15 DIAGNOSIS — F32.A DEPRESSION, UNSPECIFIED DEPRESSION TYPE: ICD-10-CM

## 2022-04-15 DIAGNOSIS — M19.90 ARTHRITIS: ICD-10-CM

## 2022-04-15 PROCEDURE — 99214 OFFICE O/P EST MOD 30 MIN: CPT | Performed by: NURSE PRACTITIONER

## 2022-04-15 PROCEDURE — G8420 CALC BMI NORM PARAMETERS: HCPCS | Performed by: NURSE PRACTITIONER

## 2022-04-15 PROCEDURE — 1101F PT FALLS ASSESS-DOCD LE1/YR: CPT | Performed by: NURSE PRACTITIONER

## 2022-04-15 PROCEDURE — G8536 NO DOC ELDER MAL SCRN: HCPCS | Performed by: NURSE PRACTITIONER

## 2022-04-15 PROCEDURE — G8752 SYS BP LESS 140: HCPCS | Performed by: NURSE PRACTITIONER

## 2022-04-15 PROCEDURE — G8400 PT W/DXA NO RESULTS DOC: HCPCS | Performed by: NURSE PRACTITIONER

## 2022-04-15 PROCEDURE — 1090F PRES/ABSN URINE INCON ASSESS: CPT | Performed by: NURSE PRACTITIONER

## 2022-04-15 PROCEDURE — G8432 DEP SCR NOT DOC, RNG: HCPCS | Performed by: NURSE PRACTITIONER

## 2022-04-15 PROCEDURE — G8427 DOCREV CUR MEDS BY ELIG CLIN: HCPCS | Performed by: NURSE PRACTITIONER

## 2022-04-15 PROCEDURE — G8754 DIAS BP LESS 90: HCPCS | Performed by: NURSE PRACTITIONER

## 2022-04-15 NOTE — PROGRESS NOTES
Chief Complaint   Patient presents with    Referral Follow Up     Pt  Is here to obtain a few referrals. Pt is here with her daughter. Need one for a wheelchair, home health, and would also like bathroom rails. 1. Have you been to the ER, urgent care clinic since your last visit? Hospitalized since your last visit? No    2. Have you seen or consulted any other health care providers outside of the [address] [place] [address] since your last visit? Include any pap smears or colon screening.  No
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
## 2022-04-20 ENCOUNTER — TELEPHONE (OUTPATIENT)
Dept: PRIMARY CARE CLINIC | Age: 82
End: 2022-04-20

## 2022-04-20 NOTE — TELEPHONE ENCOUNTER
May want to see if daughter has talked with [employer] as they may be able to advise her on this process as I believe she is trying to work it out so that she gets paid to be her aide/caregiver perhaps.

## 2022-04-20 NOTE — TELEPHONE ENCOUNTER
I informed Ssuan Tate of the conversation I had with [employer] ([employer] She stated that they did not tell her that this morning. I gave her the information to call [employer] to see if they could give her some information re: what she needs to do or where to go. Patient stated understanding and thanked me for my help.

## 2022-04-20 NOTE — TELEPHONE ENCOUNTER
I called [phone] [facility]) and spoke with Shellie Montesinos first re: request from Ximena Yi which Shellie Montesinos informed me that he did not think this was correct, approved for April to be the [address] [place] [address] for patient (be the caregiver). He then transferred me to Janus Mcburney. who informed me that in order for April to provide [address] [place] [address] to patient as a [address] [place] [address] (Nurse), she had to be registered thru [facility] as an [address] [place] [address]. The Confirmation number was [id number]. So in order for her to be a [address] [place] [address] rendering care, she has to [facility] to be [facility] by [facility].

## 2022-04-20 NOTE — TELEPHONE ENCOUNTER
Spoke with patient's daughter, Krystina Hilton. I asked her the name of the [address] that she is requesting and she stated she is the [address] that will be doing the total care for her mother. She checked with patient's insurance this morning and they informed her that all you had to do was call the number she left ([phone]) which is [employer] and not a [address]. I informed Krystina Hilton that the referrals for Home Health and PT have been sent for evaluations to be done. She stated she got a call from PT today but she will be doing this also unless they want to show her what to do. I asked if she was sure she wanted me to cancel both referrals and she stated yes. She also asked about the equipment requested at the [address] [place] [address]. I informed her that the orders had been sent to the suppliers. I informed her that I would let you know what was going on at this point and would get back to her.

## 2022-04-20 NOTE — TELEPHONE ENCOUNTER
I am still unsure of what is being said here. If you want to call that number to see what they say they need from me.

## 2022-04-28 ENCOUNTER — APPOINTMENT (OUTPATIENT)
Dept: GENERAL RADIOLOGY | Age: 82
DRG: 809 | End: 2022-04-28
Attending: FAMILY MEDICINE
Payer: MEDICARE

## 2022-04-28 ENCOUNTER — HOSPITAL ENCOUNTER (INPATIENT)
Age: 82
LOS: 1 days | Discharge: ANOTHER HOSPITAL | DRG: 809 | End: 2022-04-28
Attending: FAMILY MEDICINE | Admitting: INTERNAL MEDICINE
Payer: MEDICARE

## 2022-04-28 ENCOUNTER — TELEPHONE (OUTPATIENT)
Dept: PRIMARY CARE CLINIC | Age: 82
End: 2022-04-28

## 2022-04-28 ENCOUNTER — HOSPITAL ENCOUNTER (INPATIENT)
Age: 82
LOS: 5 days | Discharge: HOME HEALTH | DRG: 872 | End: 2022-05-03
Attending: EMERGENCY MEDICINE | Admitting: INTERNAL MEDICINE
Payer: MEDICARE

## 2022-04-28 VITALS
TEMPERATURE: 97.9 F | DIASTOLIC BLOOD PRESSURE: 64 MMHG | WEIGHT: 156 LBS | SYSTOLIC BLOOD PRESSURE: 124 MMHG | BODY MASS INDEX: 23.64 KG/M2 | RESPIRATION RATE: 18 BRPM | HEART RATE: 72 BPM | HEIGHT: 68 IN | OXYGEN SATURATION: 96 %

## 2022-04-28 DIAGNOSIS — C34.91 MALIGNANT NEOPLASM OF RIGHT LUNG, UNSPECIFIED PART OF LUNG (HCC): ICD-10-CM

## 2022-04-28 DIAGNOSIS — D70.9 NEUTROPENIC FEVER (HCC): Primary | ICD-10-CM

## 2022-04-28 DIAGNOSIS — R50.81 NEUTROPENIC FEVER (HCC): ICD-10-CM

## 2022-04-28 DIAGNOSIS — J90 PLEURAL EFFUSION: ICD-10-CM

## 2022-04-28 DIAGNOSIS — C34.81 CANCER OF OVERLAPPING SITES OF RIGHT LUNG (HCC): ICD-10-CM

## 2022-04-28 DIAGNOSIS — D70.1 CHEMOTHERAPY-INDUCED NEUTROPENIA (HCC): Primary | ICD-10-CM

## 2022-04-28 DIAGNOSIS — N39.0 URINARY TRACT INFECTION WITHOUT HEMATURIA, SITE UNSPECIFIED: ICD-10-CM

## 2022-04-28 DIAGNOSIS — T45.1X5A CHEMOTHERAPY-INDUCED NEUTROPENIA (HCC): Primary | ICD-10-CM

## 2022-04-28 DIAGNOSIS — N30.01 ACUTE CYSTITIS WITH HEMATURIA: ICD-10-CM

## 2022-04-28 DIAGNOSIS — D70.9 NEUTROPENIC FEVER (HCC): ICD-10-CM

## 2022-04-28 DIAGNOSIS — R50.81 NEUTROPENIC FEVER (HCC): Primary | ICD-10-CM

## 2022-04-28 LAB
ALBUMIN SERPL-MCNC: 2.3 G/DL (ref 3.5–5)
ALBUMIN/GLOB SERPL: 0.5 {RATIO} (ref 1.1–2.2)
ALP SERPL-CCNC: 82 U/L (ref 45–117)
ALT SERPL-CCNC: 36 U/L (ref 12–78)
ANION GAP SERPL CALC-SCNC: 13 MMOL/L (ref 5–15)
APPEARANCE UR: CLEAR
AST SERPL W P-5'-P-CCNC: 23 U/L (ref 15–37)
BACTERIA URNS QL MICRO: ABNORMAL /HPF
BASOPHILS # BLD: 0 K/UL (ref 0–0.2)
BASOPHILS NFR BLD: 0 % (ref 0–2.5)
BILIRUB SERPL-MCNC: 0.5 MG/DL (ref 0.2–1)
BILIRUB UR QL: NEGATIVE
BUN SERPL-MCNC: 16 MG/DL (ref 6–20)
BUN/CREAT SERPL: 13 (ref 12–20)
CA-I BLD-MCNC: 9 MG/DL (ref 8.5–10.1)
CHLORIDE SERPL-SCNC: 96 MMOL/L (ref 97–108)
CO2 SERPL-SCNC: 22 MMOL/L (ref 21–32)
COLOR UR: ABNORMAL
CREAT SERPL-MCNC: 1.27 MG/DL (ref 0.55–1.02)
EOSINOPHIL # BLD: 0 K/UL (ref 0–0.7)
EOSINOPHIL NFR BLD: 0 % (ref 0.9–2.9)
ERYTHROCYTE [DISTWIDTH] IN BLOOD BY AUTOMATED COUNT: 15.8 % (ref 11.5–14.5)
GLOBULIN SER CALC-MCNC: 4.7 G/DL (ref 2–4)
GLUCOSE SERPL-MCNC: 105 MG/DL (ref 65–100)
GLUCOSE UR STRIP.AUTO-MCNC: NEGATIVE MG/DL
HCT VFR BLD AUTO: 27 % (ref 36–46)
HGB BLD-MCNC: 8.8 G/DL (ref 13.5–17.5)
HGB UR QL STRIP: ABNORMAL
KETONES UR QL STRIP.AUTO: ABNORMAL MG/DL
LACTATE SERPL-SCNC: 0.9 MMOL/L (ref 0.4–2)
LEUKOCYTE ESTERASE UR QL STRIP.AUTO: ABNORMAL
LYMPHOCYTES # BLD: 1 K/UL (ref 1–4.8)
LYMPHOCYTES NFR BLD: 39 % (ref 20.5–51.1)
MCH RBC QN AUTO: 25.3 PG (ref 31–34)
MCHC RBC AUTO-ENTMCNC: 32.5 G/DL (ref 31–36)
MCV RBC AUTO: 77.9 FL (ref 80–100)
MONOCYTES # BLD: 0.9 K/UL (ref 0.2–2.4)
MONOCYTES NFR BLD: 35 % (ref 1.7–9.3)
NEUTS SEG # BLD: 0.7 K/UL (ref 1.8–7.7)
NEUTS SEG NFR BLD: 26 % (ref 42–75)
NITRITE UR QL STRIP.AUTO: NEGATIVE
NRBC # BLD: 0.01 K/UL
NRBC BLD-RTO: 0.6 PER 100 WBC
PH UR STRIP: 6.5 [PH] (ref 5–8)
PLATELET # BLD AUTO: 469 K/UL (ref 150–400)
PMV BLD AUTO: 7.2 FL (ref 6.5–11.5)
POTASSIUM SERPL-SCNC: 3.8 MMOL/L (ref 3.5–5.1)
PROT SERPL-MCNC: 7 G/DL (ref 6.4–8.2)
PROT UR STRIP-MCNC: 30 MG/DL
RBC # BLD AUTO: 3.47 M/UL (ref 4.5–5.9)
RBC #/AREA URNS HPF: ABNORMAL /HPF (ref 0–3)
SODIUM SERPL-SCNC: 131 MMOL/L (ref 136–145)
SP GR UR REFRACTOMETRY: 1.01 (ref 1–1.03)
UROBILINOGEN UR QL STRIP.AUTO: 2 EU/DL (ref 0.2–1)
WBC # BLD AUTO: 2.7 K/UL (ref 4.4–11.3)
WBC URNS QL MICRO: >100 /HPF (ref 0–5)

## 2022-04-28 PROCEDURE — 87186 SC STD MICRODIL/AGAR DIL: CPT

## 2022-04-28 PROCEDURE — 36415 COLL VENOUS BLD VENIPUNCTURE: CPT

## 2022-04-28 PROCEDURE — 65270000029 HC RM PRIVATE

## 2022-04-28 PROCEDURE — 85610 PROTHROMBIN TIME: CPT

## 2022-04-28 PROCEDURE — 71045 X-RAY EXAM CHEST 1 VIEW: CPT

## 2022-04-28 PROCEDURE — 85025 COMPLETE CBC W/AUTO DIFF WBC: CPT

## 2022-04-28 PROCEDURE — 87077 CULTURE AEROBIC IDENTIFY: CPT

## 2022-04-28 PROCEDURE — 82728 ASSAY OF FERRITIN: CPT

## 2022-04-28 PROCEDURE — 99285 EMERGENCY DEPT VISIT HI MDM: CPT

## 2022-04-28 PROCEDURE — 87040 BLOOD CULTURE FOR BACTERIA: CPT

## 2022-04-28 PROCEDURE — 74011000258 HC RX REV CODE- 258: Performed by: FAMILY MEDICINE

## 2022-04-28 PROCEDURE — 83540 ASSAY OF IRON: CPT

## 2022-04-28 PROCEDURE — 83605 ASSAY OF LACTIC ACID: CPT

## 2022-04-28 PROCEDURE — 83615 LACTATE (LD) (LDH) ENZYME: CPT

## 2022-04-28 PROCEDURE — 80053 COMPREHEN METABOLIC PANEL: CPT

## 2022-04-28 PROCEDURE — 85045 AUTOMATED RETICULOCYTE COUNT: CPT

## 2022-04-28 PROCEDURE — 74011250636 HC RX REV CODE- 250/636: Performed by: FAMILY MEDICINE

## 2022-04-28 PROCEDURE — 87086 URINE CULTURE/COLONY COUNT: CPT

## 2022-04-28 PROCEDURE — 85730 THROMBOPLASTIN TIME PARTIAL: CPT

## 2022-04-28 PROCEDURE — 83010 ASSAY OF HAPTOGLOBIN QUANT: CPT

## 2022-04-28 PROCEDURE — 81001 URINALYSIS AUTO W/SCOPE: CPT

## 2022-04-28 RX ORDER — CYPROHEPTADINE HYDROCHLORIDE 4 MG/1
4 TABLET ORAL
Status: DISCONTINUED | OUTPATIENT
Start: 2022-04-28 | End: 2022-05-03 | Stop reason: HOSPADM

## 2022-04-28 RX ORDER — METOPROLOL SUCCINATE 25 MG/1
12.5 TABLET, EXTENDED RELEASE ORAL DAILY
Status: DISCONTINUED | OUTPATIENT
Start: 2022-04-29 | End: 2022-04-29

## 2022-04-28 RX ORDER — SODIUM CHLORIDE 0.9 % (FLUSH) 0.9 %
5-40 SYRINGE (ML) INJECTION AS NEEDED
Status: DISCONTINUED | OUTPATIENT
Start: 2022-04-28 | End: 2022-05-03 | Stop reason: HOSPADM

## 2022-04-28 RX ORDER — POLYETHYLENE GLYCOL 3350 17 G/17G
17 POWDER, FOR SOLUTION ORAL DAILY PRN
Status: DISCONTINUED | OUTPATIENT
Start: 2022-04-28 | End: 2022-05-03 | Stop reason: HOSPADM

## 2022-04-28 RX ORDER — ACETAMINOPHEN 325 MG/1
650 TABLET ORAL
Status: DISCONTINUED | OUTPATIENT
Start: 2022-04-28 | End: 2022-04-28

## 2022-04-28 RX ORDER — ENOXAPARIN SODIUM 100 MG/ML
40 INJECTION SUBCUTANEOUS DAILY
Status: DISCONTINUED | OUTPATIENT
Start: 2022-04-29 | End: 2022-04-28

## 2022-04-28 RX ORDER — ONDANSETRON 2 MG/ML
4 INJECTION INTRAMUSCULAR; INTRAVENOUS
Status: DISCONTINUED | OUTPATIENT
Start: 2022-04-28 | End: 2022-04-28

## 2022-04-28 RX ORDER — SODIUM CHLORIDE 9 MG/ML
100 INJECTION, SOLUTION INTRAVENOUS CONTINUOUS
Status: DISPENSED | OUTPATIENT
Start: 2022-04-28 | End: 2022-04-29

## 2022-04-28 RX ORDER — ALBUTEROL SULFATE 90 UG/1
1 AEROSOL, METERED RESPIRATORY (INHALATION)
Status: DISCONTINUED | OUTPATIENT
Start: 2022-04-28 | End: 2022-05-03 | Stop reason: HOSPADM

## 2022-04-28 RX ORDER — ONDANSETRON 4 MG/1
4 TABLET, ORALLY DISINTEGRATING ORAL
Status: DISCONTINUED | OUTPATIENT
Start: 2022-04-28 | End: 2022-05-03 | Stop reason: HOSPADM

## 2022-04-28 RX ORDER — ACETAMINOPHEN 650 MG/1
650 SUPPOSITORY RECTAL
Status: DISCONTINUED | OUTPATIENT
Start: 2022-04-28 | End: 2022-04-28

## 2022-04-28 RX ORDER — ONDANSETRON 2 MG/ML
4 INJECTION INTRAMUSCULAR; INTRAVENOUS
Status: DISCONTINUED | OUTPATIENT
Start: 2022-04-28 | End: 2022-05-03 | Stop reason: HOSPADM

## 2022-04-28 RX ORDER — BENZONATATE 100 MG/1
200 CAPSULE ORAL
Status: DISCONTINUED | OUTPATIENT
Start: 2022-04-28 | End: 2022-04-28

## 2022-04-28 RX ORDER — POLYETHYLENE GLYCOL 3350 17 G/17G
17 POWDER, FOR SOLUTION ORAL DAILY PRN
Status: DISCONTINUED | OUTPATIENT
Start: 2022-04-28 | End: 2022-04-28

## 2022-04-28 RX ORDER — SODIUM CHLORIDE 0.9 % (FLUSH) 0.9 %
5-40 SYRINGE (ML) INJECTION AS NEEDED
Status: DISCONTINUED | OUTPATIENT
Start: 2022-04-28 | End: 2022-04-28

## 2022-04-28 RX ORDER — ONDANSETRON 4 MG/1
4 TABLET, ORALLY DISINTEGRATING ORAL
Status: DISCONTINUED | OUTPATIENT
Start: 2022-04-28 | End: 2022-04-28

## 2022-04-28 RX ORDER — SODIUM CHLORIDE 0.9 % (FLUSH) 0.9 %
5-40 SYRINGE (ML) INJECTION EVERY 8 HOURS
Status: DISCONTINUED | OUTPATIENT
Start: 2022-04-28 | End: 2022-05-03 | Stop reason: HOSPADM

## 2022-04-28 RX ORDER — ENOXAPARIN SODIUM 100 MG/ML
40 INJECTION SUBCUTANEOUS DAILY
Status: DISCONTINUED | OUTPATIENT
Start: 2022-04-29 | End: 2022-05-03 | Stop reason: HOSPADM

## 2022-04-28 RX ORDER — ACETAMINOPHEN 325 MG/1
650 TABLET ORAL
Status: DISCONTINUED | OUTPATIENT
Start: 2022-04-28 | End: 2022-05-03 | Stop reason: HOSPADM

## 2022-04-28 RX ORDER — MECLIZINE HYDROCHLORIDE 25 MG/1
25 TABLET ORAL
Status: DISCONTINUED | OUTPATIENT
Start: 2022-04-28 | End: 2022-04-28

## 2022-04-28 RX ORDER — SODIUM CHLORIDE 0.9 % (FLUSH) 0.9 %
5-40 SYRINGE (ML) INJECTION EVERY 8 HOURS
Status: DISCONTINUED | OUTPATIENT
Start: 2022-04-28 | End: 2022-04-28

## 2022-04-28 RX ORDER — ACETAMINOPHEN 650 MG/1
650 SUPPOSITORY RECTAL
Status: DISCONTINUED | OUTPATIENT
Start: 2022-04-28 | End: 2022-05-03 | Stop reason: HOSPADM

## 2022-04-28 RX ADMIN — VANCOMYCIN HYDROCHLORIDE 1000 MG: 1 INJECTION, POWDER, LYOPHILIZED, FOR SOLUTION INTRAVENOUS at 15:59

## 2022-04-28 RX ADMIN — CEFEPIME HYDROCHLORIDE 2 G: 2 INJECTION, POWDER, FOR SOLUTION INTRAVENOUS at 14:40

## 2022-04-28 NOTE — PROGRESS NOTES
Maxipime (Cefepime) Extended Infusion    Armin Abdi, a 80 y.o. yo female, has been converted to an extended infusion of Maxipime. A loading dose of Cefepime 2 g over 30 min was given. Extended infusions will run over 4 hours (240 minutes).     Recent Labs     04/28/22  1244   CREA 1.27*     Ht Readings from Last 1 Encounters:   04/28/22 172.7 cm (68\")     Wt Readings from Last 1 Encounters:   04/28/22 70.8 kg (156 lb)       CrCl : Serum creatinine: 1.27 mg/dL (H) 04/28/22 1244  Estimated creatinine clearance: 35 mL/min (A)    Renal adjustment of extended infusion of Maxipime  Febrile neutropenia 2 g q 8 hr for CrCl >= 60                                  2 g q 12 hr for CrCl 30 - 59                                  1 g q 12 hr for CrCl 11-29                                  1 g q 24 hr for CrCl <= 10, HD or                                                                                  PD

## 2022-04-28 NOTE — ED NOTES
Assumed care of pt. Bedside shift report received from [facility] at this time. Pt in bed resting. Female visitor at bedside. Pt appears to be in no distress at this time.

## 2022-04-28 NOTE — Clinical Note
Status[de-identified] INPATIENT [101]  Type of Bed: Remote Telemetry [29]  Cardiac Monitoring Required?: Yes  Inpatient Hospitalization Certified Necessary for the Following Reasons: 3.  Patient receiving treatment that can only be provided in an inpatient setting  (further clarification in H&P documentation)  Admitting Diagnosis: Febrile neutropenia [facility]) [[id number]]  Admitting Physician: Gina Chatterjee [[id number]]  Attending Physician: Gina Chatterjee [[id number]]  Estimated Length of Stay: 2 Midnights   Discharge Plan[de-identified] [facility]

## 2022-04-28 NOTE — ED TRIAGE NOTES
ARRIVED FROM [place] WITH [facility] AFTER HAVING A FEVER TODAY.  THEY DX WITH HER WITH PLEURAL EFFUSION  PT HAS VANC RUNNING 250/HR
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
## 2022-04-28 NOTE — ED TRIAGE NOTES
Pt with hx of stage IV lung cancer presents today with c/o fever of 101.1. PT is being treated by Dr. Yane Zambrano with [facility].

## 2022-04-29 LAB
ANION GAP SERPL CALC-SCNC: 8 MMOL/L (ref 5–15)
APTT PPP: 47 SEC (ref 21.2–34.1)
BASOPHILS # BLD: 0 K/UL (ref 0–0.1)
BASOPHILS NFR BLD: 0 % (ref 0–1)
BUN SERPL-MCNC: 16 MG/DL (ref 6–20)
BUN/CREAT SERPL: 23 (ref 12–20)
CA-I BLD-MCNC: 8.2 MG/DL (ref 8.5–10.1)
CHLORIDE SERPL-SCNC: 102 MMOL/L (ref 97–108)
CO2 SERPL-SCNC: 23 MMOL/L (ref 21–32)
CREAT SERPL-MCNC: 0.7 MG/DL (ref 0.55–1.02)
DIFFERENTIAL METHOD BLD: ABNORMAL
EOSINOPHIL # BLD: 0 K/UL (ref 0–0.4)
EOSINOPHIL NFR BLD: 0 % (ref 0–7)
ERYTHROCYTE [DISTWIDTH] IN BLOOD BY AUTOMATED COUNT: 16.2 % (ref 11.5–14.5)
FERRITIN SERPL-MCNC: 1607 NG/ML (ref 8–252)
FERRITIN SERPL-MCNC: 1680 NG/ML (ref 8–252)
FOLATE SERPL-MCNC: 19.8 NG/ML (ref 5–21)
GLUCOSE SERPL-MCNC: 71 MG/DL (ref 65–100)
HAPTOGLOB SERPL-MCNC: 634 MG/DL (ref 30–200)
HCT VFR BLD AUTO: 25.2 % (ref 35–47)
HGB BLD-MCNC: 8.1 G/DL (ref 11.5–16)
IMM GRANULOCYTES # BLD AUTO: 0 K/UL
IMM GRANULOCYTES NFR BLD AUTO: 0 %
INR PPP: 1.3 (ref 0.9–1.1)
IRON SATN MFR SERPL: 13 % (ref 20–50)
IRON SATN MFR SERPL: 14 % (ref 20–50)
IRON SERPL-MCNC: 17 UG/DL (ref 35–150)
IRON SERPL-MCNC: 19 UG/DL (ref 35–150)
LDH SERPL L TO P-CCNC: 233 U/L (ref 81–246)
LYMPHOCYTES # BLD: 1.6 K/UL (ref 0.8–3.5)
LYMPHOCYTES NFR BLD: 39 % (ref 12–49)
MCH RBC QN AUTO: 25.6 PG (ref 26–34)
MCHC RBC AUTO-ENTMCNC: 32.1 G/DL (ref 30–36.5)
MCV RBC AUTO: 79.5 FL (ref 80–99)
METAMYELOCYTES NFR BLD MANUAL: 1 %
MONOCYTES # BLD: 0.8 K/UL (ref 0–1)
MONOCYTES NFR BLD: 19 % (ref 5–13)
NEUTS SEG # BLD: 1.4 K/UL (ref 1.8–8)
NEUTS SEG NFR BLD: 36 % (ref 32–75)
NRBC # BLD: 0 K/UL (ref 0–0.01)
NRBC BLD-RTO: 0 PER 100 WBC
OTHER CELLS NFR BLD MANUAL: 5 %
PLATELET # BLD AUTO: 482 K/UL (ref 150–400)
PMV BLD AUTO: 9.6 FL (ref 8.9–12.9)
POTASSIUM SERPL-SCNC: 4.3 MMOL/L (ref 3.5–5.1)
PROTHROMBIN TIME: 16 SEC (ref 11.9–14.6)
RBC # BLD AUTO: 3.17 M/UL (ref 3.8–5.2)
RBC MORPH BLD: ABNORMAL
RBC MORPH BLD: ABNORMAL
RETICS # AUTO: 0.05 M/UL (ref 0.02–0.08)
RETICS/RBC NFR AUTO: 1.6 % (ref 0.7–2.1)
SODIUM SERPL-SCNC: 133 MMOL/L (ref 136–145)
THERAPEUTIC RANGE,PTTT: ABNORMAL SEC (ref 82–109)
TIBC SERPL-MCNC: 133 UG/DL (ref 250–450)
TIBC SERPL-MCNC: 134 UG/DL (ref 250–450)
VANCOMYCIN SERPL-MCNC: 3.2 UG/ML
VIT B12 SERPL-MCNC: 725 PG/ML (ref 193–986)
WBC # BLD AUTO: 4 K/UL (ref 3.6–11)

## 2022-04-29 PROCEDURE — 74011250636 HC RX REV CODE- 250/636: Performed by: INTERNAL MEDICINE

## 2022-04-29 PROCEDURE — 99222 1ST HOSP IP/OBS MODERATE 55: CPT | Performed by: INTERNAL MEDICINE

## 2022-04-29 PROCEDURE — 74011000250 HC RX REV CODE- 250: Performed by: INTERNAL MEDICINE

## 2022-04-29 PROCEDURE — 36415 COLL VENOUS BLD VENIPUNCTURE: CPT

## 2022-04-29 PROCEDURE — 83540 ASSAY OF IRON: CPT

## 2022-04-29 PROCEDURE — 74011250637 HC RX REV CODE- 250/637: Performed by: NURSE PRACTITIONER

## 2022-04-29 PROCEDURE — 74011000258 HC RX REV CODE- 258: Performed by: INTERNAL MEDICINE

## 2022-04-29 PROCEDURE — 85025 COMPLETE CBC W/AUTO DIFF WBC: CPT

## 2022-04-29 PROCEDURE — 80048 BASIC METABOLIC PNL TOTAL CA: CPT

## 2022-04-29 PROCEDURE — 82607 VITAMIN B-12: CPT

## 2022-04-29 PROCEDURE — 94761 N-INVAS EAR/PLS OXIMETRY MLT: CPT

## 2022-04-29 PROCEDURE — 80202 ASSAY OF VANCOMYCIN: CPT

## 2022-04-29 PROCEDURE — 74011250637 HC RX REV CODE- 250/637: Performed by: INTERNAL MEDICINE

## 2022-04-29 PROCEDURE — 82728 ASSAY OF FERRITIN: CPT

## 2022-04-29 PROCEDURE — 94640 AIRWAY INHALATION TREATMENT: CPT

## 2022-04-29 PROCEDURE — 65270000029 HC RM PRIVATE

## 2022-04-29 RX ORDER — LEVOFLOXACIN 5 MG/ML
500 INJECTION, SOLUTION INTRAVENOUS EVERY 24 HOURS
Status: DISCONTINUED | OUTPATIENT
Start: 2022-04-29 | End: 2022-04-30

## 2022-04-29 RX ORDER — TRAMADOL HYDROCHLORIDE 50 MG/1
50 TABLET ORAL
Status: DISCONTINUED | OUTPATIENT
Start: 2022-04-29 | End: 2022-05-03 | Stop reason: HOSPADM

## 2022-04-29 RX ADMIN — CEFEPIME HYDROCHLORIDE 2 G: 2 INJECTION, POWDER, FOR SOLUTION INTRAVENOUS at 21:16

## 2022-04-29 RX ADMIN — SODIUM CHLORIDE, PRESERVATIVE FREE 10 ML: 5 INJECTION INTRAVENOUS at 05:10

## 2022-04-29 RX ADMIN — SODIUM CHLORIDE, PRESERVATIVE FREE 10 ML: 5 INJECTION INTRAVENOUS at 14:13

## 2022-04-29 RX ADMIN — CEFEPIME HYDROCHLORIDE 2 G: 2 INJECTION, POWDER, FOR SOLUTION INTRAVENOUS at 05:09

## 2022-04-29 RX ADMIN — TRAMADOL HYDROCHLORIDE 50 MG: 50 TABLET, COATED ORAL at 21:16

## 2022-04-29 RX ADMIN — LEVOFLOXACIN 500 MG: 500 INJECTION, SOLUTION INTRAVENOUS at 14:13

## 2022-04-29 RX ADMIN — SODIUM CHLORIDE 1000 ML: 9 INJECTION, SOLUTION INTRAVENOUS at 00:03

## 2022-04-29 RX ADMIN — TIOTROPIUM BROMIDE INHALATION SPRAY 2 PUFF: 3.12 SPRAY, METERED RESPIRATORY (INHALATION) at 08:58

## 2022-04-29 RX ADMIN — CEFEPIME HYDROCHLORIDE 2 G: 2 INJECTION, POWDER, FOR SOLUTION INTRAVENOUS at 14:58

## 2022-04-29 RX ADMIN — SODIUM CHLORIDE 100 ML/HR: 9 INJECTION, SOLUTION INTRAVENOUS at 00:31

## 2022-04-29 RX ADMIN — SODIUM CHLORIDE, PRESERVATIVE FREE 10 ML: 5 INJECTION INTRAVENOUS at 21:17

## 2022-04-29 RX ADMIN — ENOXAPARIN SODIUM 40 MG: 100 INJECTION SUBCUTANEOUS at 09:46

## 2022-04-29 RX ADMIN — VANCOMYCIN HYDROCHLORIDE 750 MG: 750 INJECTION, POWDER, LYOPHILIZED, FOR SOLUTION INTRAVENOUS at 12:48

## 2022-04-29 RX ADMIN — ACETAMINOPHEN 650 MG: 325 TABLET ORAL at 14:58

## 2022-04-29 RX ADMIN — ACETAMINOPHEN 650 MG: 325 TABLET ORAL at 04:20

## 2022-04-29 NOTE — PROGRESS NOTES
Problem: Falls - Risk of  Goal: *Absence of Falls  Description: Document Ariadna Olson Fall Risk and appropriate interventions in the flowsheet.   Outcome: Progressing Towards Goal  Note: Fall Risk Interventions:                                Problem: Patient Education: Go to Patient Education Activity  Goal: Patient/Family Education  Outcome: Progressing Towards Goal

## 2022-04-29 NOTE — PROGRESS NOTES
Vancomycin Dosing Consult  Day #2 of vancomycin therapy  Consult ordered by Dr. Deana Colbert for this 80 y.o. female for indication of febrile neutropenia. Antibiotic regimen: Vancomycin + cefepime    Temp (24hrs), Av.7 °F (37.6 °C), Min:98.3 °F (36.8 °C), Max:101.5 °F (38.6 °C)    Recent Labs     22  0751 22  1244   WBC 4.0 2.7*   CREA 0.70 1.27*   BUN 16 16     Est CrCl: 64 ml/min  Concomitant nephrotoxic drugs: None    Cultures:    Blood - NGTD   Urine - NGTD    MRSA Swab: Not ordered, patient already received first dose of vancomycin    Target range: AUC/DESTINEE 400-600    Recent level history:  Date/Time Dose & Interval Measured Level (mcg/mL) Associated AUC/DESTINEE   2022  10:34        1000mg on  @ 15:59            3.2         Assessment/Plan:   Lung CA patient on chemotherapy with neutropenic fever, WBC now in low normal range. Will begin 750mg IV Q12H with predicted AUC of 451.   Trough level  at 11:00  Antimicrobial stop date TBD

## 2022-04-29 NOTE — PROGRESS NOTES
Start   Ordered   04/28/22 2230  IP CONSULT TO PHARMACY - VANCOMYCIN DOSING  ONE TIME     Complete     Comments: Pharmacy will order the necessary lab work, if needed, to complete the dosing and ongoing monitoring of requested drug therapy. Details will be updated within the patients Progress Notes. Ordering Provider: Mazie Boas, MD   Authorizing Provider: Mazie Boas, MD   Question: Antibiotic Indications Answer: Febrile Neutropenia         Patient recevied initial dose of [id number] mg on 4-28-22 at 1500.  Random level has been ordered for 4-29-22 at 10am
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
## 2022-04-29 NOTE — PROGRESS NOTES
Reason for Admission:   Neutropenic fever                 RUR Score:   22%        PCP: First and Last name:  Gage Souza NP     Name of Practice:   Are you a current patient: Yes/No: Yes   Approximate date of last visit: Last Friday   Can you do a virtual visit with your PCP:              Resources/supports as identified by patient/family:   Patient's daughter provides support                Top Challenges facing patient (as identified by patient/family and CM): Finances/Medication cost?   No concern expressed                 Transportation? No concern expressed              Support system or lack thereof? Patient's daughter visits pt daily and provides assistance                      Living arrangements? Patient's son lives with her            Self-care/ADLs/Cognition? Patient is mostly independent. Patient indicated she does not cook. Current Advanced Directive/Advance Care Plan:  Full Code      Healthcare Decision Maker:   Click here to complete HealthCare Decision Makers including selection of the Healthcare Decision Maker Relationship (ie \"Primary\")      Primary Decision MakeAmanda Solis - [phone]    Payor Source Payor: [facility] / Plan: [facility] [employer] MEDICARE CHOICE PPO/PFFS / Product Type: Managed Care Medicare /                             Plan for utilizing home health: Yes                 Transition of Care Plan:                  Cm met with pt at the bedside to complete discharge assessment. Cm verified home address and emergency contact - Keely Bran (daughter). Pt indicated she does not have a medical POA. Pt states her son stays with her, but he's not there all the time. Pt has a cane and recently received a wheelchair. Pt reports she is supposed to be receiving a shower chair and bars soon. Pt indicated she has a nurse that comes out and see her.  Pt could not recall the name of the home health agency, but she would like to resume home health services. Pt is agreeable with Cm calling her daughter. Pt indicated she is independent, but her daughter does stop by daily to provide some assistance. Pt's PCP is Balwinder. Pt repots seeing her PCP last Friday. Cm attempted to contact pt's daughter Whit Zendejas [phone]. Went straight to voice mail. Cm left a voice message. CM needs to find out what home health company pt is current with.
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
## 2022-04-29 NOTE — PROGRESS NOTES
DC Plan: [facility] (resume)    Cm met with pt and pt's daughter Anthony Tadeo at the bedside. Daughter is pt's caretaker. Cm discussed home health. Daughter provided Cm with home health nurse Lianet Camronjennyfer phone# [phone]. Pt will resume services with home health once medically cleared. Daughter would like an update on her mother. Cm left a message for attending to f/up with daughter at the bedside. Cm attempted to contact Júnior Duke with home health. Cm received no answer. Cm called pt's PCP office [phone]. Call was transferred to nurse Lindy Jiang. Cm left a voice message. Cm received a phone call from nurse Lindy Jiang at Olvin Fuller (NP) office. Lindy Jiang indicated pt is with [facility]. Referral sent to [facility]. Pt was accepted. Will need resumption orders once medically cleared for discharge.

## 2022-04-29 NOTE — PROGRESS NOTES
Renal Dosing/Monitoring  Medication: cefepime   Current regimen:  2g every q24H hr  Recent Labs     04/29/22  0751 04/28/22  1244   CREA 0.70 1.27*   BUN 16 16     Estimated CrCl:  64 ml/min  Plan: Change to 2gm IV Q8H  per [facility] P&T Committee Protocol with respect to renal function. Pharmacy will continue to monitor patient daily and will make dosage adjustments based upon changing renal function.

## 2022-04-29 NOTE — PROGRESS NOTES
Hospitalist Progress Note       Daily Progress Note: 4/29/2022 11:47 AM  Hospital course:     Kee Wright is a 80 y.o. female with PMH of lung cancer on chemotherapy and radiotherapy, COPD, and hypertension. She presented to the ED after being found to have neutropenic fever and with fevers. Reports overall body weakness and diminished appetite. Admits to shortness of breath with productive cough, but reports that is her baseline and has not worsened. No urinary frequency burning or urgency. No nausea, vomiting or diarrhea. Chemoport on right chest not tender. Significant labs on admission creatinine elevated from previous admission at 1.27, sodium 131, chloride 96, WBC 2.7, hemoglobin 8.8, UA positive for pyuria bacteria and leukocyte esterase. Admitted for further evaluation and management neutropenic fevers. ID and oncology consulted. Blood and urine cultures obtained. Subjective:     Examined patient at the bedside. She is sitting up eating breakfast this morning. She states she feels very weak and tired    Assessment/Plan:   Active Problems:    Neutropenic fever ([address] (4/28/2022)    Neutropenic fever  - Treat empirically with vancomycin and cefepime. - Blood culture obtained. - Initially hypotensive, hold home metoprolol.  - Consult  ID and oncology.      COPD  - Not in exacerbation  - Continue home medications.       YVONNE  -Possibly secondary to dehydration secondary to poor oral intake  -IVF infusion, continue to monitor.         DVT Prophylaxis:  Code Status: Full Code  POA/NOK:    Disposition and discharge barriers:    ID and oncology consults  Care Plan discussed with:     Current Facility-Administered Medications   Medication Dose Route Frequency    Vancomycin Pharmacy Dosing   Other Rx Dosing/Monitoring    cefepime (MAXIPIME) 2 g in 0.9% sodium chloride (MBP/ADV) 100 mL MBP  2 g IntraVENous Q24H    albuterol (PROVENTIL HFA, VENTOLIN HFA, PROAIR HFA) inhaler 1 Puff  1 Puff Inhalation Q4H PRN    cyproheptadine (PERIACTIN) tablet 4 mg  4 mg Oral TID PRN    tiotropium bromide (SPIRIVA RESPIMAT) 2.5 mcg /actuation  2 Puff Inhalation DAILY    sodium chloride (NS) flush 5-40 mL  5-40 mL IntraVENous Q8H    sodium chloride (NS) flush 5-40 mL  5-40 mL IntraVENous PRN    acetaminophen (TYLENOL) tablet 650 mg  650 mg Oral Q6H PRN    Or    acetaminophen (TYLENOL) suppository 650 mg  650 mg Rectal Q6H PRN    polyethylene glycol (MIRALAX) packet 17 g  17 g Oral DAILY PRN    ondansetron (ZOFRAN ODT) tablet 4 mg  4 mg Oral Q8H PRN    Or    ondansetron (ZOFRAN) injection 4 mg  4 mg IntraVENous Q6H PRN    enoxaparin (LOVENOX) injection 40 mg  40 mg SubCUTAneous DAILY        REVIEW OF SYSTEMS    Review of Systems   Constitutional: Positive for malaise/fatigue. Respiratory: Negative for shortness of breath. Cardiovascular: Negative for chest pain. Genitourinary: Negative for dysuria. Musculoskeletal: Positive for myalgias. Neurological: Positive for weakness. Objective:     Visit Vitals  /63 (BP 1 Location: Right upper arm, BP Patient Position: At rest)   Pulse 89   Temp 98.3 °F (36.8 °C)   Resp 18   Ht 5' 8\" (1.727 m)   Wt 70.8 kg (156 lb)   SpO2 96%   BMI 23.72 kg/m²      O2 Device: None (Room air)    Temp (24hrs), Av.4 °F (37.4 °C), Min:97.9 °F (36.6 °C), Max:101.5 °F (38.6 °C)      No intake/output data recorded. No intake/output data recorded. PHYSICAL EXAM:    Physical Exam  Constitutional:       Appearance: She is ill-appearing. Cardiovascular:      Rate and Rhythm: Regular rhythm. Tachycardia present. Pulmonary:      Effort: No respiratory distress. Abdominal:      General: There is no distension. Musculoskeletal:         General: Normal range of motion. Skin:     Findings: No bruising. Neurological:      Motor: Weakness present.           Data Review    Recent Results (from the past 24 hour(s))   CBC WITH AUTOMATED DIFF    Collection Time: 22 12:44 PM   Result Value Ref Range    WBC 2.7 (L) 4.4 - 11.3 K/uL    RBC 3.47 (L) 4.50 - 5.90 M/uL    HGB 8.8 (L) 13.5 - 17.5 g/dL    HCT 27.0 (L) 36 - 46 %    MCV 77.9 (L) 80 - 100 FL    MCH 25.3 (L) 31 - 34 PG    MCHC 32.5 31.0 - 36.0 g/dL    RDW 15.8 (H) 11.5 - 14.5 %    PLATELET 636 (H) 226 - 400 K/uL    MPV 7.2 6.5 - 11.5 FL    NRBC 0.6  WBC    ABSOLUTE NRBC 0.01 K/uL    NEUTROPHILS 26 (L) 42 - 75 %    LYMPHOCYTES 39 20.5 - 51.1 %    MONOCYTES 35 (H) 1.7 - 9.3 %    EOSINOPHILS 0 (L) 0.9 - 2.9 %    BASOPHILS 0 0.0 - 2.5 %    ABS. NEUTROPHILS 0.7 (L) 1.8 - 7.7 K/UL    ABS. LYMPHOCYTES 1.0 1.0 - 4.8 K/UL    ABS. MONOCYTES 0.9 0.2 - 2.4 K/UL    ABS. EOSINOPHILS 0.0 0.0 - 0.7 K/UL    ABS. BASOPHILS 0.0 0.0 - 0.2 K/UL   METABOLIC PANEL, COMPREHENSIVE    Collection Time: 04/28/22 12:44 PM   Result Value Ref Range    Sodium 131 (L) 136 - 145 mmol/L    Potassium 3.8 3.5 - 5.1 mmol/L    Chloride 96 (L) 97 - 108 mmol/L    CO2 22 21 - 32 mmol/L    Anion gap 13 5 - 15 mmol/L    Glucose 105 (H) 65 - 100 mg/dL    BUN 16 6 - 20 mg/dL    Creatinine 1.27 (H) 0.55 - 1.02 mg/dL    BUN/Creatinine ratio 13 12 - 20      GFR est AA 49 (L) >60 ml/min/1.73m2    GFR est non-AA 40 (L) >60 ml/min/1.73m2    Calcium 9.0 8.5 - 10.1 mg/dL    Bilirubin, total 0.5 0.2 - 1.0 mg/dL    AST (SGOT) 23 15 - 37 U/L    ALT (SGPT) 36 12 - 78 U/L    Alk.  phosphatase 82 45 - 117 U/L    Protein, total 7.0 6.4 - 8.2 g/dL    Albumin 2.3 (L) 3.5 - 5.0 g/dL    Globulin 4.7 (H) 2.0 - 4.0 g/dL    A-G Ratio 0.5 (L) 1.1 - 2.2     URINALYSIS W/ RFLX MICROSCOPIC    Collection Time: 04/28/22 12:44 PM   Result Value Ref Range    Color Yellow/Straw      Appearance Clear Clear      Specific gravity 1.010 1.003 - 1.030      pH (UA) 6.5 5.0 - 8.0      Protein 30 (A) Negative mg/dL    Glucose Negative Negative mg/dL    Ketone Trace (A) Negative mg/dL    Bilirubin Negative Negative      Blood Trace (A) Negative      Urobilinogen 2.0 (H) 0.2 - 1.0 EU/dL    Nitrites Negative Negative      Leukocyte Esterase Moderate (A) Negative     LACTIC ACID    Collection Time: 04/28/22 12:44 PM   Result Value Ref Range    Lactic acid 0.9 0.4 - 2.0 mmol/L   CULTURE, BLOOD    Collection Time: 04/28/22 12:44 PM    Specimen: Blood   Result Value Ref Range    Special Requests: No Special Requests      Culture result: No growth after 16 hours     URINE MICROSCOPIC    Collection Time: 04/28/22 12:44 PM   Result Value Ref Range    WBC >100 (H) 0 - 5 /hpf    RBC 5-10 0 - 3 /hpf    Bacteria 3+ (A) Negative /hpf   PROTHROMBIN TIME + INR    Collection Time: 04/28/22 11:25 PM   Result Value Ref Range    Prothrombin time 16.0 (H) 11.9 - 14.6 sec    INR 1.3 (H) 0.9 - 1.1     PTT    Collection Time: 04/28/22 11:25 PM   Result Value Ref Range    aPTT 47.0 (H) 21.2 - 34.1 sec    aPTT, therapeutic range   82 - 109 sec   RETICULOCYTE COUNT    Collection Time: 04/28/22 11:25 PM   Result Value Ref Range    Reticulocyte count 1.6 0.7 - 2.1 %    Absolute Retic Cnt. 0.0494 0.0164 - 0.0776 M/ul   LD    Collection Time: 04/28/22 11:25 PM   Result Value Ref Range     81 - 968 U/L   METABOLIC PANEL, BASIC    Collection Time: 04/29/22  7:51 AM   Result Value Ref Range    Sodium 133 (L) 136 - 145 mmol/L    Potassium 4.3 3.5 - 5.1 mmol/L    Chloride 102 97 - 108 mmol/L    CO2 23 21 - 32 mmol/L    Anion gap 8 5 - 15 mmol/L    Glucose 71 65 - 100 mg/dL    BUN 16 6 - 20 mg/dL    Creatinine 0.70 0.55 - 1.02 mg/dL    BUN/Creatinine ratio 23 (H) 12 - 20      GFR est AA >60 >60 ml/min/1.73m2    GFR est non-AA >60 >60 ml/min/1.73m2    Calcium 8.2 (L) 8.5 - 10.1 mg/dL   CBC WITH AUTOMATED DIFF    Collection Time: 04/29/22  7:51 AM   Result Value Ref Range    WBC 4.0 3.6 - 11.0 K/uL    RBC 3.17 (L) 3.80 - 5.20 M/uL    HGB 8.1 (L) 11.5 - 16.0 g/dL    HCT 25.2 (L) 35.0 - 47.0 %    MCV 79.5 (L) 80.0 - 99.0 FL    MCH 25.6 (L) 26.0 - 34.0 PG    MCHC 32.1 30.0 - 36.5 g/dL    RDW 16.2 (H) 11.5 - 14.5 %    PLATELET 893 (H) 575 - 400 K/uL MPV 9.6 8.9 - 12.9 FL    NRBC 0.0 0.0  WBC    ABSOLUTE NRBC 0.00 0.00 - 0.01 K/uL    NEUTROPHILS 36 32 - 75 %    LYMPHOCYTES 39 12 - 49 %    MONOCYTES 19 (H) 5 - 13 %    EOSINOPHILS 0 0 - 7 %    BASOPHILS 0 0 - 1 %    METAMYELOCYTES 1 (H) 0 %    OTHER CELL 5 %    IMMATURE GRANULOCYTES 0 %    ABS. NEUTROPHILS 1.4 (L) 1.8 - 8.0 K/UL    ABS. LYMPHOCYTES 1.6 0.8 - 3.5 K/UL    ABS. MONOCYTES 0.8 0.0 - 1.0 K/UL    ABS. EOSINOPHILS 0.0 0.0 - 0.4 K/UL    ABS. BASOPHILS 0.0 0.0 - 0.1 K/UL    ABS. IMM. GRANS. 0.0 K/UL    DF Manual      RBC COMMENTS Microcytosis  2+        RBC COMMENTS Anisocytosis  1+           No orders to display             _____________________________________________________________________________  Time spent in direct care including coordination of service, review of data and examination: > 35 minutes    ______________________________________________________________________________    Gregg Rolon NP    This is dictation was done by dragon, computer voice recognition software. Quite often unanticipated grammatical, syntax, homophones and other interpretive errors or inadvertently transcribed by the computer software. Please excuse errors that have escaped final proofreading. Thank you.
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
## 2022-04-30 LAB
ANION GAP SERPL CALC-SCNC: 9 MMOL/L (ref 5–15)
BACTERIA SPEC CULT: ABNORMAL
BASOPHILS # BLD: 0.1 K/UL (ref 0–0.1)
BASOPHILS NFR BLD: 1 % (ref 0–1)
BUN SERPL-MCNC: 12 MG/DL (ref 6–20)
BUN/CREAT SERPL: 23 (ref 12–20)
CA-I BLD-MCNC: 8.5 MG/DL (ref 8.5–10.1)
CHLORIDE SERPL-SCNC: 101 MMOL/L (ref 97–108)
CO2 SERPL-SCNC: 23 MMOL/L (ref 21–32)
COLONY COUNT,CNT: ABNORMAL
CREAT SERPL-MCNC: 0.52 MG/DL (ref 0.55–1.02)
DATE LAST DOSE: 0
DIFFERENTIAL METHOD BLD: ABNORMAL
EOSINOPHIL # BLD: 0 K/UL (ref 0–0.4)
EOSINOPHIL NFR BLD: 0 % (ref 0–7)
ERYTHROCYTE [DISTWIDTH] IN BLOOD BY AUTOMATED COUNT: 15.8 % (ref 11.5–14.5)
GLUCOSE SERPL-MCNC: 56 MG/DL (ref 65–100)
HCT VFR BLD AUTO: 24.5 % (ref 35–47)
HGB BLD-MCNC: 7.8 G/DL (ref 11.5–16)
IMM GRANULOCYTES # BLD AUTO: 0 K/UL
IMM GRANULOCYTES NFR BLD AUTO: 0 %
LYMPHOCYTES # BLD: 1.6 K/UL (ref 0.8–3.5)
LYMPHOCYTES NFR BLD: 29 % (ref 12–49)
MCH RBC QN AUTO: 24.9 PG (ref 26–34)
MCHC RBC AUTO-ENTMCNC: 31.8 G/DL (ref 30–36.5)
MCV RBC AUTO: 78.3 FL (ref 80–99)
METAMYELOCYTES NFR BLD MANUAL: 2 %
MONOCYTES # BLD: 1 K/UL (ref 0–1)
MONOCYTES NFR BLD: 18 % (ref 5–13)
NEUTS SEG # BLD: 2.7 K/UL (ref 1.8–8)
NEUTS SEG NFR BLD: 48 % (ref 32–75)
NRBC # BLD: 0 K/UL (ref 0–0.01)
NRBC BLD-RTO: 0 PER 100 WBC
PLATELET # BLD AUTO: 518 K/UL (ref 150–400)
PMV BLD AUTO: 9.2 FL (ref 8.9–12.9)
POTASSIUM SERPL-SCNC: 4 MMOL/L (ref 3.5–5.1)
PROMYELOCYTES NFR BLD MANUAL: 2 %
RBC # BLD AUTO: 3.13 M/UL (ref 3.8–5.2)
RBC MORPH BLD: ABNORMAL
RBC MORPH BLD: ABNORMAL
REPORTED DOSE,DOSE: 0 UNITS
SODIUM SERPL-SCNC: 133 MMOL/L (ref 136–145)
SPECIAL REQUESTS,SREQ: ABNORMAL
VANCOMYCIN TROUGH SERPL-MCNC: 10.9 UG/ML (ref 5–10)
WBC # BLD AUTO: 5.6 K/UL (ref 3.6–11)

## 2022-04-30 PROCEDURE — 80202 ASSAY OF VANCOMYCIN: CPT

## 2022-04-30 PROCEDURE — 74011000250 HC RX REV CODE- 250: Performed by: INTERNAL MEDICINE

## 2022-04-30 PROCEDURE — 94761 N-INVAS EAR/PLS OXIMETRY MLT: CPT

## 2022-04-30 PROCEDURE — 74011250636 HC RX REV CODE- 250/636: Performed by: NURSE PRACTITIONER

## 2022-04-30 PROCEDURE — 74011250636 HC RX REV CODE- 250/636: Performed by: INTERNAL MEDICINE

## 2022-04-30 PROCEDURE — 74011250637 HC RX REV CODE- 250/637: Performed by: INTERNAL MEDICINE

## 2022-04-30 PROCEDURE — 99232 SBSQ HOSP IP/OBS MODERATE 35: CPT | Performed by: INTERNAL MEDICINE

## 2022-04-30 PROCEDURE — 36415 COLL VENOUS BLD VENIPUNCTURE: CPT

## 2022-04-30 PROCEDURE — 85025 COMPLETE CBC W/AUTO DIFF WBC: CPT

## 2022-04-30 PROCEDURE — 80048 BASIC METABOLIC PNL TOTAL CA: CPT

## 2022-04-30 PROCEDURE — 94640 AIRWAY INHALATION TREATMENT: CPT

## 2022-04-30 PROCEDURE — 65270000029 HC RM PRIVATE

## 2022-04-30 PROCEDURE — 74011000258 HC RX REV CODE- 258: Performed by: INTERNAL MEDICINE

## 2022-04-30 RX ORDER — LANOLIN ALCOHOL/MO/W.PET/CERES
1 CREAM (GRAM) TOPICAL
Status: DISCONTINUED | OUTPATIENT
Start: 2022-05-01 | End: 2022-05-03 | Stop reason: HOSPADM

## 2022-04-30 RX ADMIN — SODIUM CHLORIDE, PRESERVATIVE FREE 10 ML: 5 INJECTION INTRAVENOUS at 22:28

## 2022-04-30 RX ADMIN — CEFEPIME HYDROCHLORIDE 2 G: 2 INJECTION, POWDER, FOR SOLUTION INTRAVENOUS at 14:40

## 2022-04-30 RX ADMIN — VANCOMYCIN HYDROCHLORIDE 750 MG: 750 INJECTION, POWDER, LYOPHILIZED, FOR SOLUTION INTRAVENOUS at 00:57

## 2022-04-30 RX ADMIN — ENOXAPARIN SODIUM 40 MG: 100 INJECTION SUBCUTANEOUS at 08:33

## 2022-04-30 RX ADMIN — SODIUM CHLORIDE, PRESERVATIVE FREE 10 ML: 5 INJECTION INTRAVENOUS at 13:25

## 2022-04-30 RX ADMIN — TIOTROPIUM BROMIDE INHALATION SPRAY 2 PUFF: 3.12 SPRAY, METERED RESPIRATORY (INHALATION) at 08:42

## 2022-04-30 RX ADMIN — SODIUM CHLORIDE, PRESERVATIVE FREE 10 ML: 5 INJECTION INTRAVENOUS at 06:00

## 2022-04-30 RX ADMIN — VANCOMYCIN HYDROCHLORIDE 750 MG: 750 INJECTION, POWDER, LYOPHILIZED, FOR SOLUTION INTRAVENOUS at 13:24

## 2022-04-30 RX ADMIN — IRON SUCROSE 100 MG: 20 INJECTION, SOLUTION INTRAVENOUS at 12:12

## 2022-04-30 RX ADMIN — LEVOFLOXACIN 500 MG: 500 INJECTION, SOLUTION INTRAVENOUS at 12:14

## 2022-04-30 RX ADMIN — ACETAMINOPHEN 650 MG: 325 TABLET ORAL at 12:32

## 2022-04-30 RX ADMIN — CEFEPIME HYDROCHLORIDE 2 G: 2 INJECTION, POWDER, FOR SOLUTION INTRAVENOUS at 05:03

## 2022-04-30 RX ADMIN — ONDANSETRON 4 MG: 2 INJECTION INTRAMUSCULAR; INTRAVENOUS at 21:44

## 2022-04-30 NOTE — PROGRESS NOTES
Hospitalist Progress Note       Daily Progress Note: 4/30/2022 11:47 AM  Hospital course:     Arcadio Call is a 80 y.o. female with PMH of lung cancer on chemotherapy and radiotherapy, COPD, and hypertension. She presented to the ED after being found to have neutropenic fever and with fevers. Reports overall body weakness and diminished appetite. Admits to shortness of breath with productive cough, but reports that is her baseline and has not worsened. No urinary frequency burning or urgency. No nausea, vomiting or diarrhea. Chemoport on right chest not tender. Significant labs on admission creatinine elevated from previous admission at 1.27, sodium 131, chloride 96, WBC 2.7, hemoglobin 8.8, UA positive for pyuria bacteria and leukocyte esterase. Admitted for further evaluation and management neutropenic fevers. ID and oncology consulted. Blood and urine cultures obtained. He has been started on cefepime IV. Iron studies revealing iron deficiency we will start on oral and IV replacement while inpatient. Subjective:     Examined patient at the bedside. She is more alert today, states she feels better. Assessment/Plan:   Active Problems:    Neutropenic fever ([address] (4/28/2022)    Neutropenic fever  Treat empirically with vancomycin and cefepime. Blood culture obtained negative's thus far  Urine culture positive for GNR's awaiting sensitivities  Initially hypotensive, hold home metoprolol. Oncology following  ID following     COPD  Not in exacerbation  Continue home medications.       YVONNE  Possibly secondary to dehydration secondary to poor oral intake  IVF infusion, continue to monitor.     Anemia  Blood studies revealing iron deficiencies  Started on IV and p.o. iron    DVT Prophylaxis: Trend dictated  Code Status: Full Code  POA/NOK:    Disposition and discharge barriers:    ID and oncology consultscurrently on IV antibiotics and IV iron   Pending urine culture sensitivities  Care Plan discussed with: Patient, staff and IDR team    Current Facility-Administered Medications   Medication Dose Route Frequency    iron sucrose (VENOFER) 100 mg in 0.9% sodium chloride 100 mL IVPB  100 mg IntraVENous Q24H    [START ON 5/1/2022] ferrous sulfate tablet 325 mg  1 Tablet Oral DAILY WITH BREAKFAST    Vancomycin Pharmacy Dosing   Other Rx Dosing/Monitoring    cefepime (MAXIPIME) 2 g in 0.9% sodium chloride (MBP/ADV) 100 mL MBP  2 g IntraVENous Q8H    vancomycin (VANCOCIN) 750 mg in 0.9% sodium chloride 250 mL (VIAL-MATE)  750 mg IntraVENous Q12H    Vancomycin trough level 4/30 at 11:00   Other ONCE    levoFLOXacin (LEVAQUIN) 500 mg in D5W IVPB  500 mg IntraVENous Q24H    traMADoL (ULTRAM) tablet 50 mg  50 mg Oral Q6H PRN    albuterol (PROVENTIL HFA, VENTOLIN HFA, PROAIR HFA) inhaler 1 Puff  1 Puff Inhalation Q4H PRN    cyproheptadine (PERIACTIN) tablet 4 mg  4 mg Oral TID PRN    tiotropium bromide (SPIRIVA RESPIMAT) 2.5 mcg /actuation  2 Puff Inhalation DAILY    sodium chloride (NS) flush 5-40 mL  5-40 mL IntraVENous Q8H    sodium chloride (NS) flush 5-40 mL  5-40 mL IntraVENous PRN    acetaminophen (TYLENOL) tablet 650 mg  650 mg Oral Q6H PRN    Or    acetaminophen (TYLENOL) suppository 650 mg  650 mg Rectal Q6H PRN    polyethylene glycol (MIRALAX) packet 17 g  17 g Oral DAILY PRN    ondansetron (ZOFRAN ODT) tablet 4 mg  4 mg Oral Q8H PRN    Or    ondansetron (ZOFRAN) injection 4 mg  4 mg IntraVENous Q6H PRN    enoxaparin (LOVENOX) injection 40 mg  40 mg SubCUTAneous DAILY        REVIEW OF SYSTEMS    Review of Systems   Constitutional: Positive for malaise/fatigue. Respiratory: Negative for shortness of breath. Cardiovascular: Negative for chest pain. Genitourinary: Negative for dysuria. Musculoskeletal: Positive for myalgias. Neurological: Positive for weakness.         Objective:     Visit Vitals  BP (!) 128/58 (BP 1 Location: Left upper arm, BP Patient Position: At rest;Lying right side) Pulse 97   Temp 98.3 °F (36.8 °C)   Resp 12   Ht 5' 8\" (1.727 m)   Wt 70.8 kg (156 lb)   SpO2 94%   BMI 23.72 kg/m²      O2 Device: None (Room air)    Temp (24hrs), Av.2 °F (37.3 °C), Min:97.5 °F (36.4 °C), Max:100.7 °F (38.2 °C)      No intake/output data recorded. No intake/output data recorded. PHYSICAL EXAM:    Physical Exam  Constitutional:       Appearance: She is ill-appearing. Cardiovascular:      Rate and Rhythm: Regular rhythm. Tachycardia present. Pulmonary:      Effort: No respiratory distress. Abdominal:      General: There is no distension. Musculoskeletal:         General: Normal range of motion. Skin:     Findings: No bruising. Neurological:      Motor: Weakness present. Data Review    Recent Results (from the past 24 hour(s))   CBC WITH AUTOMATED DIFF    Collection Time: 22  8:22 AM   Result Value Ref Range    WBC 5.6 3.6 - 11.0 K/uL    RBC 3.13 (L) 3.80 - 5.20 M/uL    HGB 7.8 (L) 11.5 - 16.0 g/dL    HCT 24.5 (L) 35.0 - 47.0 %    MCV 78.3 (L) 80.0 - 99.0 FL    MCH 24.9 (L) 26.0 - 34.0 PG    MCHC 31.8 30.0 - 36.5 g/dL    RDW 15.8 (H) 11.5 - 14.5 %    PLATELET 445 (H) 868 - 400 K/uL    MPV 9.2 8.9 - 12.9 FL    NRBC 0.0 0.0  WBC    ABSOLUTE NRBC 0.00 0.00 - 0.01 K/uL    NEUTROPHILS PENDING %    LYMPHOCYTES PENDING %    MONOCYTES PENDING %    EOSINOPHILS PENDING %    BASOPHILS PENDING %    IMMATURE GRANULOCYTES PENDING %    ABS. NEUTROPHILS PENDING K/UL    ABS. LYMPHOCYTES PENDING K/UL    ABS. MONOCYTES PENDING K/UL    ABS. EOSINOPHILS PENDING K/UL    ABS. BASOPHILS PENDING K/UL    ABS. IMM. GRANS.  PENDING K/UL    DF PENDING    METABOLIC PANEL, BASIC    Collection Time: 22  8:22 AM   Result Value Ref Range    Sodium 133 (L) 136 - 145 mmol/L    Potassium 4.0 3.5 - 5.1 mmol/L    Chloride 101 97 - 108 mmol/L    CO2 23 21 - 32 mmol/L    Anion gap 9 5 - 15 mmol/L    Glucose 56 (L) 65 - 100 mg/dL    BUN 12 6 - 20 mg/dL    Creatinine 0.52 (L) 0.55 - 1.02 mg/dL BUN/Creatinine ratio 23 (H) 12 - 20      GFR est AA >60 >60 ml/min/1.73m2    GFR est non-AA >60 >60 ml/min/1.73m2    Calcium 8.5 8.5 - 10.1 mg/dL       No orders to display             _____________________________________________________________________________  Time spent in direct care including coordination of service, review of data and examination: > 35 minutes    ______________________________________________________________________________    Gregg Rolon NP    This is dictation was done by dragon, computer voice recognition software. Quite often unanticipated grammatical, syntax, homophones and other interpretive errors or inadvertently transcribed by the computer software. Please excuse errors that have escaped final proofreading. Thank you.

## 2022-04-30 NOTE — PROGRESS NOTES
Problem: Falls - Risk of  Goal: *Absence of Falls  Description: Document Lanrenejuancho Sagastume Fall Risk and appropriate interventions in the flowsheet. Outcome: Progressing Towards Goal  Note: Fall Risk Interventions:                                Problem: Patient Education: Go to Patient Education Activity  Goal: Patient/Family Education  Outcome: Progressing Towards Goal     Problem: Pressure Injury - Risk of  Goal: *Prevention of pressure injury  Description: Document Ravi Scale and appropriate interventions in the flowsheet.   Outcome: Progressing Towards Goal  Note: Pressure Injury Interventions:                      Nutrition Interventions: Document food/fluid/supplement intake                     Problem: Patient Education: Go to Patient Education Activity  Goal: Patient/Family Education  Outcome: Progressing Towards Goal

## 2022-04-30 NOTE — PROGRESS NOTES
Vancomycin Dosing Consult  Day #3 of vancomycin therapy  Consult ordered by Dr. Mari Juarez for this 80 y.o. female for indication of febrile neutropenia.   Antibiotic regimen: Vancomycin + Cefepime  + Levaquin    Temp (24hrs), Av.6 °F (37.6 °C), Min:97.5 °F (36.4 °C), Max:102.3 °F (39.1 °C)    Recent Labs     22  0822 22  0751 22  1244   WBC 5.6 4.0 2.7*   CREA 0.52* 0.70 1.27*   BUN 12 16 16     Est CrCl: 63.6 ml/min  Concomitant nephrotoxic drugs: None    Cultures:    Blood: NGTD - prelim   Urine: Citrobacter freundii - Final    MRSA Swab: Not ordered, patient already received first dose of vancomycin    Target range: AUC/DESTINEE 400-600    Recent level history:  Date/Time Dose & Interval Measured Level (mcg/mL) Associated AUC/DESTINEE    @1034 1000 mg Iv x 1 dose  3.2     @1146 750 mg IV q12h 10.9 451        Assessment/Plan:   Lung CA patient on chemotherapy with neutropenic fever, WBC normalized   Discontinue Vancomycin 750 mg IV q12h, if continued predicted AUC of 375  Initiate Vancomycin 1000 mg q12h with predicted AUC of 495  Schedule trough level for  @ 1100  Antimicrobial stop date TBD (pending bcx)

## 2022-04-30 NOTE — PROGRESS NOTES
Hematology Oncology Progress Note     Interval History :    She had fever this afternoon of 102 and reports not feeling well. Her family is at bedside. She has no appetite. BP is good. Denied any dysuria. Feels urinating normally. No new cough or sob. Subjective:       Current Facility-Administered Medications   Medication Dose Route Frequency    [START ON 5/1/2022] ferrous sulfate tablet 325 mg  1 Tablet Oral DAILY WITH BREAKFAST    iron sucrose (VENOFER) injection 100 mg  100 mg IntraVENous Q24H    [START ON 5/2/2022] Vancomycin - Draw trough at 1100 on 5/2 prior to next dose. Thanks! Other ONCE    [START ON 5/1/2022] vancomycin (VANCOCIN) 1,000 mg in 0.9% sodium chloride 250 mL (VIAL-MATE)  1,000 mg IntraVENous Q12H    Vancomycin Pharmacy Dosing   Other Rx Dosing/Monitoring    cefepime (MAXIPIME) 2 g in 0.9% sodium chloride (MBP/ADV) 100 mL MBP  2 g IntraVENous Q8H    levoFLOXacin (LEVAQUIN) 500 mg in D5W IVPB  500 mg IntraVENous Q24H    traMADoL (ULTRAM) tablet 50 mg  50 mg Oral Q6H PRN    albuterol (PROVENTIL HFA, VENTOLIN HFA, PROAIR HFA) inhaler 1 Puff  1 Puff Inhalation Q4H PRN    cyproheptadine (PERIACTIN) tablet 4 mg  4 mg Oral TID PRN    tiotropium bromide (SPIRIVA RESPIMAT) 2.5 mcg /actuation  2 Puff Inhalation DAILY    sodium chloride (NS) flush 5-40 mL  5-40 mL IntraVENous Q8H    sodium chloride (NS) flush 5-40 mL  5-40 mL IntraVENous PRN    acetaminophen (TYLENOL) tablet 650 mg  650 mg Oral Q6H PRN    Or    acetaminophen (TYLENOL) suppository 650 mg  650 mg Rectal Q6H PRN    polyethylene glycol (MIRALAX) packet 17 g  17 g Oral DAILY PRN    ondansetron (ZOFRAN ODT) tablet 4 mg  4 mg Oral Q8H PRN    Or    ondansetron (ZOFRAN) injection 4 mg  4 mg IntraVENous Q6H PRN    enoxaparin (LOVENOX) injection 40 mg  40 mg SubCUTAneous DAILY        Review of Systems:    Constitutional Positive for  fevers, chills, night sweats, excessive fatigue or weight loss. Allergic/Immunologic No recent allergic reactions   Eyes No significant visual difficulties. No diplopia. ENMT No problems with hearing, no sore throat, no sinus drainage. Endocrine No hot flashes or night sweats. No cold intolerance, polyuria, or polydipsia   Hematologic/Lymphatic No easy bruising or bleeding. The patient denies any tender or palpable lymph nodes   Breasts No abnormal masses of breast, nipple discharge or pain. Respiratory No dyspnea on exertion, orthopnea, chest pain, cough or hemoptysis. Cardiovascular No anginal chest pain, irregular heart beat, tachycardia, palpitations or orthopnea. Gastrointestinal No nausea, vomiting, diarrhea, constipation, cramping, dysphagia, reflux, heartburn, GI bleeding, or early satiety. No change in bowel habits. Genitourinary (M) No hematuria, dysuria, increased frequency, urgency, hesitancy or incontinence. Musculoskeletal No joint pain, swelling or redness. No decreased range of motion. Integumentary No chronic rashes, inflammation, ulcerations, pruritus, petechiae, purpura, ecchymoses, or skin changes. Neurologic No headache, blurred vision, and no areas of focal weakness or numbness. Normal gait. No sensory problems. Psychiatric No insomnia, depression, el or mood swings. No psychotropic drugs. Objective:     Patient Vitals for the past 8 hrs:   BP Temp Pulse Resp SpO2   22 1603 116/66 98.8 °F (37.1 °C) 78 18 97 %   22 1323  99.5 °F (37.5 °C)      22 1223 (!) 154/73 (!) 102.3 °F (39.1 °C) 90 20 93 %       Temp (24hrs), Av.5 °F (37.5 °C), Min:97.5 °F (36.4 °C), Max:102.3 °F (39.1 °C)      Physical Exam:  Constitutional AA female alert, cooperative, oriented. Mood and affect appropriate. Appears close to chronological age. Well nourished. Well developed. Head Normocephalic; no scars   Eyes Conjunctivae and sclerae are clear and without icterus. Pupils are reactive and equal.   ENMT Sinuses are nontender. No oral exudates, ulcers, masses, thrush or mucositis. Oropharynx clear. Tongue normal.   Neck Supple without masses or thyromegaly. No jugular venous distension. Hematologic/Lymphatic No petechiae or purpura. No tender or palpable lymph nodes in the cervical, supraclavicular, axillary or inguinal area. Respiratory Lungs are clear to auscultation without rhonchi or wheezing. Cardiovascular Regular rate and rhythm of heart without murmurs, gallops or rubs. Chest / Line Site Chest is symmetric with no chest wall deformities. Abdomen Non-tender, non-distended, no masses, ascites or hepatosplenomegaly. Good bowel sounds. No guarding or rebound tenderness. No pulsatile masses. Musculoskeletal No tenderness or swelling, normal range of motion without obvious weakness. Extremities No visible deformities, no cyanosis, clubbing or edema. Skin No rashes, scars, or lesions suggestive of malignancy. No petechiae, purpura, or ecchymoses. No excoriations. Neurologic No sensory or motor deficits, normal cerebellar function, normal gait, cranial nerves intact. Psychiatric Alert and oriented times three. Coherent speech. Verbalizes understanding of our discussions today.      Lab/Data Review:      Lab/Data Review:  Recent Labs     04/30/22  0822 04/29/22  0751 04/28/22  1244   WBC 5.6 4.0 2.7*   HGB 7.8* 8.1* 8.8*   HCT 24.5* 25.2* 27.0*   * 482* 469*     Recent Labs     04/30/22  0822 04/29/22  0751 04/28/22  2325 04/28/22  1244   * 133*  --  131*   K 4.0 4.3  --  3.8    102  --  96*   CO2 23 23  --  22   GLU 56* 71  --  105*   BUN 12 16  --  16   CREA 0.52* 0.70  --  1.27*   CA 8.5 8.2*  --  9.0   ALB  --   --   --  2.3*   TBILI  --   --   --  0.5   ALT  --   --   --  36   INR  --   --  1.3*  --      No results for input(s): PH, PCO2, PO2, HCO3, FIO2 in the last 72 hours. Radiology:   No results found.          Assessment /Plan:     Active Problems:    Neutropenic fever ([address] (4/28/2022)            1. Extensive stageSmall cell lung cancer with brain mets. :  - S/p gammaknife treatment for brain mets on 4/19/22. - Now receiving palliative chemo with carboplatin/etoposide and teccentriq. Last chemo on 4/12-4/14.      2) Neutropenic fever: Urine culture is growing citrobacter freundii. Cefepime susceptible. levaquin intermediate. U/a positive for LE. Port site looks good. - WBC count recovered to 5.4 and anc is 2700.   -Agree with cefepime 2gm Iv q 8hrs. Was given vanco as well.   -cxray shows right Lower lobe consolidation and effuson from lung ca. This does not look much changed .   -cont levaquin. Pending blood cultures.    3) Anemia: likely secondary to chemo. No need for transfusions  -check iron studies, b12 and folate. May need transfusion tomorrow. Transfuse for hb less than 7gm/dl.    4) continue dvt prophylaxis with lovenox.      Discussed my recommendations with patient.            Juan Miguel Hernandez MD  4/30/2022

## 2022-04-30 NOTE — PROGRESS NOTES
Progress Note    Patient: Emily Camacho MRN: [id number]  SSN: [id number]    YOB: 1940  Age: 80 y.o. Sex: female      Admit Date: 4/28/2022    LOS: 2 days     Subjective:   Patient followed by Dr. Jamzin Lang for sepsis in the setting of febrile neutropenia and UTI. Blood cultures negative so far but urine growing Gram negative rods. Patient is currently on Vancomycin, Levaquin and Cefepime. She spiked to 102 today but with normal WBC. Patient lying in bed with no new complaints. She affirmed \"stinging\" when she urinated. Family members at bedside. Objective:     Vitals:    04/29/22 2000 04/30/22 0008 04/30/22 0820 04/30/22 0842   BP:  (!) 127/54 (!) 128/58    Pulse: 85 91 97    Resp:  10 12    Temp:  (!) 100.7 °F (38.2 °C) 98.3 °F (36.8 °C)    SpO2:  96% 93% 94%   Weight:       Height:            Intake and Output:  Current Shift: No intake/output data recorded. Last three shifts: No intake/output data recorded. Physical Exam:   Physical Exam  Vitals and nursing note reviewed. Exam conducted with a chaperone present (Family members at bedside). Constitutional:       Appearance: She is ill-appearing. HENT:      Head: Normocephalic and atraumatic. Cardiovascular:      Comments: Right-sided Portacath  Pulmonary:      Effort: Pulmonary effort is normal.      Breath sounds: Normal breath sounds. Abdominal:      General: Bowel sounds are normal. There is no distension. Palpations: Abdomen is soft. Tenderness: There is abdominal tenderness. Genitourinary:     Comments: No [place]  Musculoskeletal:      Right lower leg: No edema. Left lower leg: No edema. Skin:     Findings: No rash. Neurological:      General: No focal deficit present. Mental Status: She is alert and oriented to person, place, and time.    Psychiatric:         Behavior: Behavior normal.         Lab/Data Review:     WBC 5,600 with ANC 1,400    Blood cultures (4/28) No growth 2 days  Urine culture (4/28) >100,000 cfu/ml Citrobacter freundii sensitive to Cefepime, Zosyn, Bactrim, intermediate to Levaquin/Ciprofloxacin    Assessment:     Active Problems:    Neutropenic fever ([address] (4/28/2022)      1. Sepsis with fever and leukopenia  2. UTI with pyuria/bacteriuria, secondary to Citrobacter freundii, Day #2 IV Cefepime  3. Small cell lung cancer, Stage 4 with brain metastasis    Plan:     1. Continue Cefepime   2. Discontinue Levaquin (intermediate resistance)  3. Continue Vancomycin for now given Portacath  4. Follow-up blood cultures  4. In am, check procal and CRP    Milton Tate MD       Signed By: Delon Carr MD     April 30, 2022

## 2022-05-01 LAB
ANION GAP SERPL CALC-SCNC: 7 MMOL/L (ref 5–15)
BASOPHILS # BLD: 0 K/UL (ref 0–0.1)
BASOPHILS NFR BLD: 0 % (ref 0–1)
BUN SERPL-MCNC: 9 MG/DL (ref 6–20)
BUN/CREAT SERPL: 12 (ref 12–20)
CA-I BLD-MCNC: 8.8 MG/DL (ref 8.5–10.1)
CHLORIDE SERPL-SCNC: 100 MMOL/L (ref 97–108)
CO2 SERPL-SCNC: 25 MMOL/L (ref 21–32)
CREAT SERPL-MCNC: 0.77 MG/DL (ref 0.55–1.02)
CRP SERPL-MCNC: 27 MG/DL (ref 0–0.6)
DIFFERENTIAL METHOD BLD: ABNORMAL
EOSINOPHIL # BLD: 0 K/UL (ref 0–0.4)
EOSINOPHIL NFR BLD: 0 % (ref 0–7)
ERYTHROCYTE [DISTWIDTH] IN BLOOD BY AUTOMATED COUNT: 15.9 % (ref 11.5–14.5)
GLUCOSE SERPL-MCNC: 106 MG/DL (ref 65–100)
HCT VFR BLD AUTO: 24.2 % (ref 35–47)
HGB BLD-MCNC: 7.8 G/DL (ref 11.5–16)
IMM GRANULOCYTES # BLD AUTO: 0 K/UL
IMM GRANULOCYTES NFR BLD AUTO: 0 %
LYMPHOCYTES # BLD: 1.9 K/UL (ref 0.8–3.5)
LYMPHOCYTES NFR BLD: 25 % (ref 12–49)
MCH RBC QN AUTO: 25 PG (ref 26–34)
MCHC RBC AUTO-ENTMCNC: 32.2 G/DL (ref 30–36.5)
MCV RBC AUTO: 77.6 FL (ref 80–99)
METAMYELOCYTES NFR BLD MANUAL: 3 %
MONOCYTES # BLD: 1.2 K/UL (ref 0–1)
MONOCYTES NFR BLD: 15 % (ref 5–13)
NEUTS SEG # BLD: 3.9 K/UL (ref 1.8–8)
NEUTS SEG NFR BLD: 50 % (ref 32–75)
NRBC # BLD: 0.02 K/UL (ref 0–0.01)
NRBC BLD-RTO: 0.3 PER 100 WBC
PLATELET # BLD AUTO: 572 K/UL (ref 150–400)
PMV BLD AUTO: 9.2 FL (ref 8.9–12.9)
POTASSIUM SERPL-SCNC: 3.7 MMOL/L (ref 3.5–5.1)
PROCALCITONIN SERPL-MCNC: 0.42 NG/ML
PROMYELOCYTES NFR BLD MANUAL: 7 %
RBC # BLD AUTO: 3.12 M/UL (ref 3.8–5.2)
RBC MORPH BLD: ABNORMAL
SODIUM SERPL-SCNC: 132 MMOL/L (ref 136–145)
WBC # BLD AUTO: 7.7 K/UL (ref 3.6–11)

## 2022-05-01 PROCEDURE — 74011000250 HC RX REV CODE- 250: Performed by: INTERNAL MEDICINE

## 2022-05-01 PROCEDURE — 74011250637 HC RX REV CODE- 250/637: Performed by: NURSE PRACTITIONER

## 2022-05-01 PROCEDURE — 74011250636 HC RX REV CODE- 250/636: Performed by: INTERNAL MEDICINE

## 2022-05-01 PROCEDURE — 86140 C-REACTIVE PROTEIN: CPT

## 2022-05-01 PROCEDURE — 84145 PROCALCITONIN (PCT): CPT

## 2022-05-01 PROCEDURE — 65270000029 HC RM PRIVATE

## 2022-05-01 PROCEDURE — 74011250636 HC RX REV CODE- 250/636: Performed by: NURSE PRACTITIONER

## 2022-05-01 PROCEDURE — 94640 AIRWAY INHALATION TREATMENT: CPT

## 2022-05-01 PROCEDURE — 94761 N-INVAS EAR/PLS OXIMETRY MLT: CPT

## 2022-05-01 PROCEDURE — 74011250637 HC RX REV CODE- 250/637: Performed by: INTERNAL MEDICINE

## 2022-05-01 PROCEDURE — 36415 COLL VENOUS BLD VENIPUNCTURE: CPT

## 2022-05-01 PROCEDURE — 85025 COMPLETE CBC W/AUTO DIFF WBC: CPT

## 2022-05-01 PROCEDURE — 99232 SBSQ HOSP IP/OBS MODERATE 35: CPT | Performed by: INTERNAL MEDICINE

## 2022-05-01 PROCEDURE — 80048 BASIC METABOLIC PNL TOTAL CA: CPT

## 2022-05-01 PROCEDURE — 74011000258 HC RX REV CODE- 258: Performed by: INTERNAL MEDICINE

## 2022-05-01 RX ORDER — BENZONATATE 100 MG/1
200 CAPSULE ORAL
Status: DISCONTINUED | OUTPATIENT
Start: 2022-05-01 | End: 2022-05-03 | Stop reason: HOSPADM

## 2022-05-01 RX ORDER — BENZONATATE 100 MG/1
200 CAPSULE ORAL
Status: DISCONTINUED | OUTPATIENT
Start: 2022-05-01 | End: 2022-05-01 | Stop reason: DRUGHIGH

## 2022-05-01 RX ORDER — CYPROHEPTADINE HYDROCHLORIDE 4 MG/1
4 TABLET ORAL
Status: DISCONTINUED | OUTPATIENT
Start: 2022-05-01 | End: 2022-05-03 | Stop reason: HOSPADM

## 2022-05-01 RX ADMIN — IRON SUCROSE 100 MG: 20 INJECTION, SOLUTION INTRAVENOUS at 11:41

## 2022-05-01 RX ADMIN — TIOTROPIUM BROMIDE INHALATION SPRAY 2 PUFF: 3.12 SPRAY, METERED RESPIRATORY (INHALATION) at 08:47

## 2022-05-01 RX ADMIN — SODIUM CHLORIDE, PRESERVATIVE FREE 10 ML: 5 INJECTION INTRAVENOUS at 13:17

## 2022-05-01 RX ADMIN — CYPROHEPTADINE HYDROCHLORIDE 4 MG: 4 TABLET ORAL at 11:41

## 2022-05-01 RX ADMIN — FERROUS SULFATE TAB 325 MG (65 MG ELEMENTAL FE) 325 MG: 325 (65 FE) TAB at 08:34

## 2022-05-01 RX ADMIN — CEFEPIME 1 G: 1 INJECTION, POWDER, FOR SOLUTION INTRAMUSCULAR; INTRAVENOUS at 13:17

## 2022-05-01 RX ADMIN — CYPROHEPTADINE HYDROCHLORIDE 4 MG: 4 TABLET ORAL at 17:07

## 2022-05-01 RX ADMIN — ENOXAPARIN SODIUM 40 MG: 100 INJECTION SUBCUTANEOUS at 08:34

## 2022-05-01 RX ADMIN — CEFEPIME 1 G: 1 INJECTION, POWDER, FOR SOLUTION INTRAMUSCULAR; INTRAVENOUS at 22:25

## 2022-05-01 RX ADMIN — VANCOMYCIN HYDROCHLORIDE 1000 MG: 1 INJECTION, POWDER, LYOPHILIZED, FOR SOLUTION INTRAVENOUS at 01:25

## 2022-05-01 RX ADMIN — SODIUM CHLORIDE, PRESERVATIVE FREE 10 ML: 5 INJECTION INTRAVENOUS at 22:35

## 2022-05-01 RX ADMIN — ONDANSETRON 4 MG: 2 INJECTION INTRAMUSCULAR; INTRAVENOUS at 19:52

## 2022-05-01 RX ADMIN — SODIUM CHLORIDE, PRESERVATIVE FREE 10 ML: 5 INJECTION INTRAVENOUS at 05:55

## 2022-05-01 NOTE — PROGRESS NOTES
Hematology Oncology Progress Note     Interval History :    .  Neutropenia is better and resolved. she has no further fever since yesterday. She is starting to feel better now appetite is improved. her family is at bedside. BP is good. Denied any dysuria. Feels urinating normally. No new cough or sob. Subjective:       Current Facility-Administered Medications   Medication Dose Route Frequency    cyproheptadine (PERIACTIN) tablet 4 mg  4 mg Oral TIDAC    cefepime (MAXIPIME) 1 g in 0.9% sodium chloride (MBP/ADV) 50 mL MBP  1 g IntraVENous Q8H    ferrous sulfate tablet 325 mg  1 Tablet Oral DAILY WITH BREAKFAST    iron sucrose (VENOFER) injection 100 mg  100 mg IntraVENous Q24H    traMADoL (ULTRAM) tablet 50 mg  50 mg Oral Q6H PRN    albuterol (PROVENTIL HFA, VENTOLIN HFA, PROAIR HFA) inhaler 1 Puff  1 Puff Inhalation Q4H PRN    cyproheptadine (PERIACTIN) tablet 4 mg  4 mg Oral TID PRN    tiotropium bromide (SPIRIVA RESPIMAT) 2.5 mcg /actuation  2 Puff Inhalation DAILY    sodium chloride (NS) flush 5-40 mL  5-40 mL IntraVENous Q8H    sodium chloride (NS) flush 5-40 mL  5-40 mL IntraVENous PRN    acetaminophen (TYLENOL) tablet 650 mg  650 mg Oral Q6H PRN    Or    acetaminophen (TYLENOL) suppository 650 mg  650 mg Rectal Q6H PRN    polyethylene glycol (MIRALAX) packet 17 g  17 g Oral DAILY PRN    ondansetron (ZOFRAN ODT) tablet 4 mg  4 mg Oral Q8H PRN    Or    ondansetron (ZOFRAN) injection 4 mg  4 mg IntraVENous Q6H PRN    enoxaparin (LOVENOX) injection 40 mg  40 mg SubCUTAneous DAILY        Review of Systems:    Constitutional Positive for  fevers, chills, night sweats, excessive fatigue or weight loss. Allergic/Immunologic No recent allergic reactions   Eyes No significant visual difficulties. No diplopia. ENMT No problems with hearing, no sore throat, no sinus drainage. Endocrine No hot flashes or night sweats.  No cold intolerance, polyuria, or polydipsia   Hematologic/Lymphatic No easy bruising or bleeding. The patient denies any tender or palpable lymph nodes   Breasts No abnormal masses of breast, nipple discharge or pain. Respiratory No dyspnea on exertion, orthopnea, chest pain, cough or hemoptysis. Cardiovascular No anginal chest pain, irregular heart beat, tachycardia, palpitations or orthopnea. Gastrointestinal No nausea, vomiting, diarrhea, constipation, cramping, dysphagia, reflux, heartburn, GI bleeding, or early satiety. No change in bowel habits. Genitourinary (M) No hematuria, dysuria, increased frequency, urgency, hesitancy or incontinence. Musculoskeletal No joint pain, swelling or redness. No decreased range of motion. Integumentary No chronic rashes, inflammation, ulcerations, pruritus, petechiae, purpura, ecchymoses, or skin changes. Neurologic No headache, blurred vision, and no areas of focal weakness or numbness. Normal gait. No sensory problems. Psychiatric No insomnia, depression, el or mood swings. No psychotropic drugs. Objective:     Patient Vitals for the past 8 hrs:   BP Temp Pulse Resp SpO2   22 0847     96 %   22 0813 131/65 99.7 °F (37.6 °C) 86 16 95 %       Temp (24hrs), Av.1 °F (37.3 °C), Min:98.6 °F (37 °C), Max:99.7 °F (37.6 °C)      Physical Exam:  Constitutional AA female alert, cooperative, oriented. Mood and affect appropriate. Appears close to chronological age. Well nourished. Well developed. Head Normocephalic; no scars   Eyes Conjunctivae and sclerae are clear and without icterus. Pupils are reactive and equal.   ENMT Sinuses are nontender. No oral exudates, ulcers, masses, thrush or mucositis. Oropharynx clear. Tongue normal.   Neck Supple without masses or thyromegaly. No jugular venous distension. Hematologic/Lymphatic No petechiae or purpura. No tender or palpable lymph nodes in the cervical, supraclavicular, axillary or inguinal area.    Respiratory Lungs are clear to auscultation without rhonchi or wheezing. Cardiovascular Regular rate and rhythm of heart without murmurs, gallops or rubs. Chest / Line Site Chest is symmetric with no chest wall deformities. Abdomen Non-tender, non-distended, no masses, ascites or hepatosplenomegaly. Good bowel sounds. No guarding or rebound tenderness. No pulsatile masses. Musculoskeletal No tenderness or swelling, normal range of motion without obvious weakness. Extremities No visible deformities, no cyanosis, clubbing or edema. Skin No rashes, scars, or lesions suggestive of malignancy. No petechiae, purpura, or ecchymoses. No excoriations. Neurologic No sensory or motor deficits, normal cerebellar function, normal gait, cranial nerves intact. Psychiatric Alert and oriented times three. Coherent speech. Verbalizes understanding of our discussions today.      Lab/Data Review:      Lab/Data Review:  Recent Labs     05/01/22  1118 04/30/22  0822 04/29/22  0751   WBC 7.7 5.6 4.0   HGB 7.8* 7.8* 8.1*   HCT 24.2* 24.5* 25.2*   * 518* 482*     Recent Labs     05/01/22  1118 04/30/22  0822 04/29/22  0751 04/28/22  2325   * 133* 133*  --    K 3.7 4.0 4.3  --     101 102  --    CO2 25 23 23  --    * 56* 71  --    BUN 9 12 16  --    CREA 0.77 0.52* 0.70  --    CA 8.8 8.5 8.2*  --    INR  --   --   --  1.3*     No results for input(s): PH, PCO2, PO2, HCO3, FIO2 in the last 72 hours. Radiology:   No results found. Assessment /Plan:     Active Problems:    Neutropenic fever ([address] (4/28/2022)            1. Extensive stageSmall cell lung cancer with brain mets. :  - S/p gammaknife treatment for brain mets on 4/19/22. - Now receiving palliative chemo with carboplatin/etoposide and teccentriq. Last chemo on 4/12-4/14.      2) Neutropenic fever: Urine culture is growing citrobacter freundii. Cefepime susceptible. levaquin intermediate. U/a positive for LE. Port site looks good.     - WBC count recovered to 7.7 and anc is 3900  -Agree with cefepime 2gm Iv q 8hrs. Was given vanco as well.   -cxray shows right Lower lobe consolidation and effuson from lung ca. This does not look much changed .   -cont levaquin. Pending blood cultures. --So far negative     3) Anemia: likely secondary to chemo. No need for transfusions  -Functional iron deficiency with low iron sats. Start her on oral iron  - normal W46 folic acid levels iron studies showing   May need transfusion tomorrow. Transfuse for hb less than 7gm/dl.    4) continue dvt prophylaxis with lovenox.      Discussed my recommendations with patient.            Latasha Johnston MD  5/1/2022

## 2022-05-01 NOTE — PROGRESS NOTES
Hospitalist Progress Note       Daily Progress Note: 5/1/2022 11:47 AM  Hospital course:     Alger Lennox is a 80 y.o. female with PMH of lung cancer on chemotherapy and radiotherapy, COPD, and hypertension. She presented to the ED after being found to have neutropenic fever and with fevers. Reports overall body weakness and diminished appetite. Admits to shortness of breath with productive cough, but reports that is her baseline and has not worsened. No urinary frequency burning or urgency. No nausea, vomiting or diarrhea. Chemoport on right chest not tender. Significant labs on admission creatinine elevated from previous admission at 1.27, sodium 131, chloride 96, WBC 2.7, hemoglobin 8.8, UA positive for pyuria bacteria and leukocyte esterase. Admitted for further evaluation and management neutropenic fevers. ID and oncology consulted. Blood and urine cultures obtained. He has been started on cefepime IV. Iron studies revealing iron deficiency we will start on oral and IV replacement while inpatient. Subjective: Follow-up examination of patient at the bedside. Continues to look weak and tired, states she is feeling good. Daughter is at the bedside. Discussed goals of care. Assessment/Plan:   Active Problems:    Neutropenic fever ([facility] [address] (4/28/2022)    Neutropenic fever  Treat empirically with IV vancomycin and IV cefepime. Blood culture obtained negative's thus far  Urine culture positive for Citrobacter freundii sensitive to current antibiotic cefepime  Initially hypotensive, hold home metoprolol. Oncology following  ID following     COPD  Not in exacerbation  Continue home medications.       YVONNE  Possibly secondary to dehydration secondary to poor oral intake  IVF infusion, continue to monitor.     Anemia  Blood studies revealing iron deficiencies  Started on IV and p.o. iron    Weakness  Failure to thrive  Continue oral Periactin    DVT Prophylaxis: Trend dictated  Code Status: Full Code  POA/NOK:    Disposition and discharge barriers:    ID and oncology consultscurrently on IV antibiotics and IV iron   Pending urine culture sensitivities  Care Plan discussed with: Patient, staff and IDR team    Current Facility-Administered Medications   Medication Dose Route Frequency    cyproheptadine (PERIACTIN) tablet 4 mg  4 mg Oral TIDAC    ferrous sulfate tablet 325 mg  1 Tablet Oral DAILY WITH BREAKFAST    iron sucrose (VENOFER) injection 100 mg  100 mg IntraVENous Q24H    [START ON 5/2/2022] Vancomycin - Draw trough at 1100 on 5/2 prior to next dose. Thanks! Other ONCE    vancomycin (VANCOCIN) 1,000 mg in 0.9% sodium chloride 250 mL (VIAL-MATE)  1,000 mg IntraVENous Q12H    Vancomycin Pharmacy Dosing   Other Rx Dosing/Monitoring    traMADoL (ULTRAM) tablet 50 mg  50 mg Oral Q6H PRN    albuterol (PROVENTIL HFA, VENTOLIN HFA, PROAIR HFA) inhaler 1 Puff  1 Puff Inhalation Q4H PRN    cyproheptadine (PERIACTIN) tablet 4 mg  4 mg Oral TID PRN    tiotropium bromide (SPIRIVA RESPIMAT) 2.5 mcg /actuation  2 Puff Inhalation DAILY    sodium chloride (NS) flush 5-40 mL  5-40 mL IntraVENous Q8H    sodium chloride (NS) flush 5-40 mL  5-40 mL IntraVENous PRN    acetaminophen (TYLENOL) tablet 650 mg  650 mg Oral Q6H PRN    Or    acetaminophen (TYLENOL) suppository 650 mg  650 mg Rectal Q6H PRN    polyethylene glycol (MIRALAX) packet 17 g  17 g Oral DAILY PRN    ondansetron (ZOFRAN ODT) tablet 4 mg  4 mg Oral Q8H PRN    Or    ondansetron (ZOFRAN) injection 4 mg  4 mg IntraVENous Q6H PRN    enoxaparin (LOVENOX) injection 40 mg  40 mg SubCUTAneous DAILY        REVIEW OF SYSTEMS    Review of Systems   Constitutional: Positive for malaise/fatigue. Respiratory: Negative for shortness of breath. Cardiovascular: Negative for chest pain. Genitourinary: Negative for dysuria. Musculoskeletal: Positive for myalgias. Neurological: Positive for weakness.         Objective:     Visit Vitals  BP 131/65   Pulse 86   Temp 99.7 °F (37.6 °C)   Resp 16   Ht 5' 8\" (1.727 m)   Wt 70.8 kg (156 lb)   SpO2 96%   BMI 23.72 kg/m²      O2 Device: None (Room air)    Temp (24hrs), Av.7 °F (37.6 °C), Min:98.6 °F (37 °C), Max:102.3 °F (39.1 °C)      No intake/output data recorded. No intake/output data recorded. PHYSICAL EXAM:    Physical Exam  Constitutional:       Appearance: She is ill-appearing. Cardiovascular:      Rate and Rhythm: Regular rhythm. Tachycardia present. Pulmonary:      Effort: No respiratory distress. Abdominal:      General: There is no distension. Musculoskeletal:         General: Normal range of motion. Skin:     Findings: No bruising. Neurological:      Motor: Weakness present. Data Review    Recent Results (from the past 24 hour(s))   VANCOMYCIN, TROUGH    Collection Time: 22 11:46 AM   Result Value Ref Range    Vancomycin,trough 10.9 (H) 5.0 - 10.0 ug/mL    Reported dose date 0      Reported dose: 0 Units       No orders to display             _____________________________________________________________________________  Time spent in direct care including coordination of service, review of data and examination: > 35 minutes    ______________________________________________________________________________    James East NP    This is dictation was done by dragon, computer voice recognition software. Quite often unanticipated grammatical, syntax, homophones and other interpretive errors or inadvertently transcribed by the computer software. Please excuse errors that have escaped final proofreading. Thank you.

## 2022-05-01 NOTE — PROGRESS NOTES
Progress Note    Patient: Donald Fair MRN: [id number]  SSN: [id number]    YOB: 1940  Age: 80 y.o. Sex: female      Admit Date: 4/28/2022    LOS: 3 days     Subjective:   Patient followed by Dr. Bobbi Khoury for sepsis in the setting of febrile neutropenia and UTI. Blood cultures negative so far but urine growing Citrobacter. Patient is currently on Cefepime. She is afebrile with normal WBC and ANC but her procal and CRP are elevated. Patient lying in bed with no new complaints but still complaining of being \"cold\". .  Family members at bedside. Objective:     Vitals:    04/30/22 2234 05/01/22 0055 05/01/22 0813 05/01/22 0847   BP: 118/68 128/66 131/65    Pulse: 70 91 86    Resp: 18 18 16    Temp: 98.6 °F (37 °C) 99.4 °F (37.4 °C) 99.7 °F (37.6 °C)    SpO2:  96% 95% 96%   Weight:       Height:            Intake and Output:  Current Shift: No intake/output data recorded. Last three shifts: No intake/output data recorded. Physical Exam:   Physical Exam  Vitals and nursing note reviewed. Exam conducted with a chaperone present (Family members at bedside). Constitutional:       Appearance: She is ill-appearing. HENT:      Head: Normocephalic and atraumatic. Cardiovascular:      Comments: Right-sided Portacath  Pulmonary:      Effort: Pulmonary effort is normal.      Breath sounds: Normal breath sounds. Abdominal:      General: Bowel sounds are normal. There is no distension. Palpations: Abdomen is soft. Tenderness: There is abdominal tenderness. Genitourinary:     Comments: No [place]  Musculoskeletal:      Right lower leg: No edema. Left lower leg: No edema. Skin:     Findings: No rash. Neurological:      General: No focal deficit present. Mental Status: She is alert and oriented to person, place, and time.    Psychiatric:         Behavior: Behavior normal.         Lab/Data Review:     WBC 7,700 with ANC 1,400    Procal 0.42  CRP 27.00    Blood cultures (4/28) No growth 3 days  Urine culture (4/28) >100,000 cfu/ml Citrobacter freundii sensitive to Cefepime, Zosyn, Bactrim, intermediate to Levaquin/Ciprofloxacin    Assessment:     Active Problems:    Neutropenic fever ([address] (4/28/2022)      1. Sepsis with fever,  Leukopenia, elevated procal and CRP  2. UTI with pyuria/bacteriuria, secondary to Citrobacter freundii, Day #3 IV Cefepime  3. Small cell lung cancer, Stage 4 with brain metastasis  4. Thrombocytosis, probably reactive  5. Penicillin allergy    Plan:     1. Continue Cefepime; may be candidate for oral Bactrim  2. Reasonable to now discontinue Vancomycin with negative blood cultures  3. Follow-up blood cultures  4. In am, repeat procal and CRP    Milton Monte MD       Signed By: Anne Marie Sales MD     May 1, 2022

## 2022-05-01 NOTE — PROGRESS NOTES
Problem: Falls - Risk of  Goal: *Absence of Falls  Description: Document Colten Garcia Fall Risk and appropriate interventions in the flowsheet. Outcome: Progressing Towards Goal  Note: Fall Risk Interventions:                                Problem: Patient Education: Go to Patient Education Activity  Goal: Patient/Family Education  Outcome: Progressing Towards Goal     Problem: Pressure Injury - Risk of  Goal: *Prevention of pressure injury  Description: Document Ravi Scale and appropriate interventions in the flowsheet.   Outcome: Progressing Towards Goal  Note: Pressure Injury Interventions:                      Nutrition Interventions: Document food/fluid/supplement intake                     Problem: Patient Education: Go to Patient Education Activity  Goal: Patient/Family Education  Outcome: Progressing Towards Goal

## 2022-05-02 PROBLEM — N39.0 UTI (URINARY TRACT INFECTION): Status: ACTIVE | Noted: 2022-05-02

## 2022-05-02 LAB
ANION GAP SERPL CALC-SCNC: 7 MMOL/L (ref 5–15)
BASOPHILS # BLD: 0 K/UL (ref 0–0.1)
BASOPHILS NFR BLD: 0 % (ref 0–1)
BUN SERPL-MCNC: 7 MG/DL (ref 6–20)
BUN/CREAT SERPL: 10 (ref 12–20)
CA-I BLD-MCNC: 9 MG/DL (ref 8.5–10.1)
CHLORIDE SERPL-SCNC: 102 MMOL/L (ref 97–108)
CO2 SERPL-SCNC: 25 MMOL/L (ref 21–32)
CREAT SERPL-MCNC: 0.71 MG/DL (ref 0.55–1.02)
CRP SERPL-MCNC: 24.1 MG/DL (ref 0–0.6)
DIFFERENTIAL METHOD BLD: ABNORMAL
EOSINOPHIL # BLD: 0 K/UL (ref 0–0.4)
EOSINOPHIL NFR BLD: 0 % (ref 0–7)
ERYTHROCYTE [DISTWIDTH] IN BLOOD BY AUTOMATED COUNT: 15.9 % (ref 11.5–14.5)
GLUCOSE SERPL-MCNC: 117 MG/DL (ref 65–100)
HCT VFR BLD AUTO: 25.1 % (ref 35–47)
HGB BLD-MCNC: 8 G/DL (ref 11.5–16)
IMM GRANULOCYTES # BLD AUTO: 0 K/UL
IMM GRANULOCYTES NFR BLD AUTO: 0 %
LYMPHOCYTES # BLD: 2.8 K/UL (ref 0.8–3.5)
LYMPHOCYTES NFR BLD: 31 % (ref 12–49)
MCH RBC QN AUTO: 25.2 PG (ref 26–34)
MCHC RBC AUTO-ENTMCNC: 31.9 G/DL (ref 30–36.5)
MCV RBC AUTO: 78.9 FL (ref 80–99)
MONOCYTES # BLD: 2.3 K/UL (ref 0–1)
MONOCYTES NFR BLD: 26 % (ref 5–13)
MYELOCYTES NFR BLD MANUAL: 1 %
NEUTS BAND NFR BLD MANUAL: 1 % (ref 0–6)
NEUTS SEG # BLD: 3.5 K/UL (ref 1.8–8)
NEUTS SEG NFR BLD: 38 % (ref 32–75)
NRBC # BLD: 0.03 K/UL (ref 0–0.01)
NRBC BLD-RTO: 0.3 PER 100 WBC
OTHER CELLS NFR BLD MANUAL: 3 %
PLATELET # BLD AUTO: 580 K/UL (ref 150–400)
PMV BLD AUTO: 9.6 FL (ref 8.9–12.9)
POTASSIUM SERPL-SCNC: 3.5 MMOL/L (ref 3.5–5.1)
PROCALCITONIN SERPL-MCNC: 0.3 NG/ML
RBC # BLD AUTO: 3.18 M/UL (ref 3.8–5.2)
RBC MORPH BLD: ABNORMAL
SODIUM SERPL-SCNC: 134 MMOL/L (ref 136–145)
WBC # BLD AUTO: 9 K/UL (ref 3.6–11)

## 2022-05-02 PROCEDURE — 94640 AIRWAY INHALATION TREATMENT: CPT

## 2022-05-02 PROCEDURE — 36415 COLL VENOUS BLD VENIPUNCTURE: CPT

## 2022-05-02 PROCEDURE — 74011000250 HC RX REV CODE- 250: Performed by: INTERNAL MEDICINE

## 2022-05-02 PROCEDURE — 74011000258 HC RX REV CODE- 258: Performed by: INTERNAL MEDICINE

## 2022-05-02 PROCEDURE — 74011250637 HC RX REV CODE- 250/637: Performed by: HOSPITALIST

## 2022-05-02 PROCEDURE — 85025 COMPLETE CBC W/AUTO DIFF WBC: CPT

## 2022-05-02 PROCEDURE — 80048 BASIC METABOLIC PNL TOTAL CA: CPT

## 2022-05-02 PROCEDURE — 99232 SBSQ HOSP IP/OBS MODERATE 35: CPT | Performed by: INTERNAL MEDICINE

## 2022-05-02 PROCEDURE — 74011250637 HC RX REV CODE- 250/637: Performed by: NURSE PRACTITIONER

## 2022-05-02 PROCEDURE — 74011250636 HC RX REV CODE- 250/636: Performed by: INTERNAL MEDICINE

## 2022-05-02 PROCEDURE — 74011250637 HC RX REV CODE- 250/637: Performed by: STUDENT IN AN ORGANIZED HEALTH CARE EDUCATION/TRAINING PROGRAM

## 2022-05-02 PROCEDURE — 74011250636 HC RX REV CODE- 250/636: Performed by: NURSE PRACTITIONER

## 2022-05-02 PROCEDURE — 84145 PROCALCITONIN (PCT): CPT

## 2022-05-02 PROCEDURE — 74011250637 HC RX REV CODE- 250/637: Performed by: INTERNAL MEDICINE

## 2022-05-02 PROCEDURE — 86140 C-REACTIVE PROTEIN: CPT

## 2022-05-02 PROCEDURE — 65270000029 HC RM PRIVATE

## 2022-05-02 RX ORDER — ADHESIVE BANDAGE
30 BANDAGE TOPICAL ONCE
Status: COMPLETED | OUTPATIENT
Start: 2022-05-02 | End: 2022-05-02

## 2022-05-02 RX ORDER — LANOLIN ALCOHOL/MO/W.PET/CERES
325 CREAM (GRAM) TOPICAL
Qty: 30 TABLET | Refills: 1 | Status: SHIPPED | OUTPATIENT
Start: 2022-05-03

## 2022-05-02 RX ORDER — SULFAMETHOXAZOLE AND TRIMETHOPRIM 800; 160 MG/1; MG/1
1 TABLET ORAL 2 TIMES DAILY
Qty: 12 TABLET | Refills: 0 | Status: SHIPPED | OUTPATIENT
Start: 2022-05-02 | End: 2022-05-03 | Stop reason: SDUPTHER

## 2022-05-02 RX ADMIN — MAGNESIUM HYDROXIDE 30 ML: 400 SUSPENSION ORAL at 11:58

## 2022-05-02 RX ADMIN — FERROUS SULFATE TAB 325 MG (65 MG ELEMENTAL FE) 325 MG: 325 (65 FE) TAB at 08:11

## 2022-05-02 RX ADMIN — IRON SUCROSE 100 MG: 20 INJECTION, SOLUTION INTRAVENOUS at 11:58

## 2022-05-02 RX ADMIN — CYPROHEPTADINE HYDROCHLORIDE 4 MG: 4 TABLET ORAL at 08:30

## 2022-05-02 RX ADMIN — TIOTROPIUM BROMIDE INHALATION SPRAY 2 PUFF: 3.12 SPRAY, METERED RESPIRATORY (INHALATION) at 08:33

## 2022-05-02 RX ADMIN — BENZONATATE 200 MG: 100 CAPSULE ORAL at 00:38

## 2022-05-02 RX ADMIN — CEFEPIME 1 G: 1 INJECTION, POWDER, FOR SOLUTION INTRAMUSCULAR; INTRAVENOUS at 05:49

## 2022-05-02 RX ADMIN — CYPROHEPTADINE HYDROCHLORIDE 4 MG: 4 TABLET ORAL at 11:58

## 2022-05-02 RX ADMIN — ENOXAPARIN SODIUM 40 MG: 100 INJECTION SUBCUTANEOUS at 08:11

## 2022-05-02 RX ADMIN — SODIUM CHLORIDE, PRESERVATIVE FREE 10 ML: 5 INJECTION INTRAVENOUS at 06:18

## 2022-05-02 RX ADMIN — CYPROHEPTADINE HYDROCHLORIDE 4 MG: 4 TABLET ORAL at 16:42

## 2022-05-02 RX ADMIN — SODIUM CHLORIDE, PRESERVATIVE FREE 10 ML: 5 INJECTION INTRAVENOUS at 20:49

## 2022-05-02 RX ADMIN — CEFEPIME 1 G: 1 INJECTION, POWDER, FOR SOLUTION INTRAMUSCULAR; INTRAVENOUS at 20:48

## 2022-05-02 RX ADMIN — BENZONATATE 200 MG: 100 CAPSULE ORAL at 08:11

## 2022-05-02 RX ADMIN — SODIUM CHLORIDE, PRESERVATIVE FREE 10 ML: 5 INJECTION INTRAVENOUS at 15:24

## 2022-05-02 RX ADMIN — CYPROHEPTADINE HYDROCHLORIDE 4 MG: 4 TABLET ORAL at 08:11

## 2022-05-02 RX ADMIN — CEFEPIME 1 G: 1 INJECTION, POWDER, FOR SOLUTION INTRAMUSCULAR; INTRAVENOUS at 15:24

## 2022-05-02 NOTE — PROGRESS NOTES
DC Plan: [facility] (resume)    Updates sent via [employer]. ______________________________________    During IDR this morning, Cm was informed family would like to obtain a hospital bed and bedside commode for pt. CM met with pt and pt's daughter Arlyn Garnica at the bedside. Cm received choice for [facility] to order equipment. If [facility] is out of stock for either equipment, Cm will reach out to another [employer] company. Cm informed daughter we are anticipating discharge in 24 hrs. Medicare pt has received, reviewed, and signed 2nd IM letter informing them of their right to appeal the discharge. Signed copied has been placed on pt bedside chart.     Referral made via [facility] to [facility].

## 2022-05-02 NOTE — PROGRESS NOTES
Infectious Disease Progress Note           Subjective:   Pt seen and examined at bedside. Stable, denies new complaints, no acute events since last seen. RUQ abdominal pain is better,  States she will like to be discharged home soon   Objective:   Physical Exam:     Visit Vitals  /75 (BP 1 Location: Left upper arm, BP Patient Position: At rest;Lying)   Pulse 94   Temp 99.5 °F (37.5 °C)   Resp 19   Ht 5' 8\" (1.727 m)   Wt 156 lb (70.8 kg)   SpO2 97%   BMI 23.72 kg/m²      O2 Device: None (Room air)    Temp (24hrs), Av.2 °F (37.3 °C), Min:98.5 °F (36.9 °C), Max:99.7 °F (37.6 °C)    No intake/output data recorded.     1901 -  0700  In: 760 [P.O.:510; I.V.:250]  Out: -     General: NAD, AAO x 4  HEENT: ANTONIO, Moist mucosa   Lungs: CTA b/l, decreased at the bases, no wheeze/rhonchi   Heart: S1S2+, RRR, no murmur  Abdo: Soft, NT, ND, +BS   : No hameed cath   Exts: No edema, + pulses b/l   Skin: right chest portacath     Data Review:       Recent Days:  Recent Labs     22  1107 22  1118 22  0822   WBC 9.0 7.7 5.6   HGB 8.0* 7.8* 7.8*   HCT 25.1* 24.2* 24.5*   * 572* 518*     Recent Labs     22  1107 22  1118 22  0822   BUN 7 9 12   CREA 0.71 0.77 0.52*       Lab Results   Component Value Date/Time    C-Reactive protein 27.00 (H) 2022 11:18 AM          Microbiology     Results     Procedure Component Value Units Date/Time    CULTURE, BLOOD [[id number]] Collected: 22 1244    Order Status: Completed Specimen: Blood Updated: 22 0717     Special Requests: No Special Requests        Culture result: No growth 4 days       CULTURE, URINE [[id number]]  (Abnormal)  (Susceptibility) Collected: 22 1244    Order Status: Completed Specimen: Urine Updated: 22 1217     Special Requests: No Special Requests        [place] Count --        >100,000  colonies/ml       Culture result: Citrobacter freundii       Susceptibility Citrobacter freundii     DESTINEE     Amikacin ($) Susceptible     Cefazolin ($) Resistant     Cefepime ($$) Susceptible     Cefoxitin Resistant     Ceftazidime ($) Susceptible     Ceftriaxone ($) Susceptible     Ciprofloxacin ($) Intermediate  [1]      Gentamicin ($) Susceptible     Levofloxacin ($) Intermediate  [2]      Meropenem ($$) Susceptible     Nitrofurantoin Susceptible     Piperacillin/Tazobac ($) Susceptible     Tobramycin ($) Susceptible     Trimeth/Sulfa Susceptible                 [1]  FDA**FDA INTERPRETATION REFLECTED, REFER TO CLSI FOR ALTERNATE  INTERPRETATIONS.**L     [2]  **FDA INTERPRETATION REFLECTED, REFER TO CLSI FOR ALTERNATE  INTERPRETATIONS.**          Linear View                            Diagnostics   CXR Results  (Last 48 hours)    None             Assessment/Plan     1. UTI, abnormal UA, Citrobacter freundii isolated from Cx I to quinolones       No indwelling hameed cath or reports of nephrolithiasis      Afebrile, WBC WNLs. Continue on Cefepime while hospitalized       On day # 5 of GNR coverage. May transition to Bactrim DS BID upon d/c to complete the remainder of therapy, 10 days from 04/28. 2. Febrile neutropenia: S/p recent chemo for lung Ca. WBC normalized, w/o G-csf. Afebrile, WBC WNLs     3. Sepsis on admission, clinically better, doing well      4. 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 RUQ abdominal pain, subjectively better, no reproducible tenderness on exam      6.  Moderate volume right pleural effusion on CXR    Twanda Castleman, MD    5/2/2022

## 2022-05-02 NOTE — PROGRESS NOTES
Patient has been nauseous frequently yet today it came suddenly. PRN was administered and awaiting results; currently resting and call bell and daughter at bedside.

## 2022-05-02 NOTE — PROGRESS NOTES
Hospitalist Progress Note    Subjective:   Daily Progress Note: 5/2/2022 1:28 PM    Hospital Course:  Sho Freitas an 80year-old female with PMHx of lung cancer on chemotherapy and radiotherapy, COPD, and hypertension who presented to the ED after being found to have neutropenic fever and with fevers. Reports overall body weakness and diminished appetite. Admits to shortness of breath with productive cough, but reports that this is her baseline and has not worsened. No urinary frequency, burning, or urgency. No nausea, vomiting or diarrhea.  Chemoport on right chest not tender. In the ED, temp of 100.7. Significant labs on admission creatinine elevated from previous admission at 1.27, sodium 131, chloride 96, WBC 2.7, hemoglobin 8.8, UA positive for pyuria bacteria and leukocyte esterase. Admitted for further evaluation and management neutropenic fevers. ID and oncology consulted. Blood culture negative. Urine culture growing Citrobacter freundii susceptible to IV cefepime. Continue on cefepime IV. Iron studies revealing iron deficiency we will start on oral and IV replacement while inpatient. The patient has a medical condition which requires positioning of the body in ways not feasible with an ordinary bed and need for frequent changes in body position. CM to arrange hospital bed. Patient is confined to a single room and would benefit from bedside commode. Subjective:    Patient seen and examined at bedside. She is complaining abdominal pain s/t \"gas pains. \" According to daughter at bedside, patient has not had a bowel movement since last Wednesday. Patient eager to go home.      Current Facility-Administered Medications   Medication Dose Route Frequency    cyproheptadine (PERIACTIN) tablet 4 mg  4 mg Oral TIDAC    cefepime (MAXIPIME) 1 g in 0.9% sodium chloride (MBP/ADV) 50 mL MBP  1 g IntraVENous Q8H    benzonatate (TESSALON) capsule 200 mg  200 mg Oral Q8H PRN    ferrous sulfate tablet 325 mg  1 Tablet Oral DAILY WITH BREAKFAST    traMADoL (ULTRAM) tablet 50 mg  50 mg Oral Q6H PRN    albuterol (PROVENTIL HFA, VENTOLIN HFA, PROAIR HFA) inhaler 1 Puff  1 Puff Inhalation Q4H PRN    cyproheptadine (PERIACTIN) tablet 4 mg  4 mg Oral TID PRN    tiotropium bromide (SPIRIVA RESPIMAT) 2.5 mcg /actuation  2 Puff Inhalation DAILY    sodium chloride (NS) flush 5-40 mL  5-40 mL IntraVENous Q8H    sodium chloride (NS) flush 5-40 mL  5-40 mL IntraVENous PRN    acetaminophen (TYLENOL) tablet 650 mg  650 mg Oral Q6H PRN    Or    acetaminophen (TYLENOL) suppository 650 mg  650 mg Rectal Q6H PRN    polyethylene glycol (MIRALAX) packet 17 g  17 g Oral DAILY PRN    ondansetron (ZOFRAN ODT) tablet 4 mg  4 mg Oral Q8H PRN    Or    ondansetron (ZOFRAN) injection 4 mg  4 mg IntraVENous Q6H PRN    enoxaparin (LOVENOX) injection 40 mg  40 mg SubCUTAneous DAILY        Review of Systems  Constitutional: Positive for malaise/fatigue. Respiratory: Negative for shortness of breath. Cardiovascular: Negative for chest pain. Genitourinary: Negative for dysuria. Musculoskeletal: Positive for myalgias. Neurological: Positive for weakness. Objective:     Visit Vitals  /75 (BP 1 Location: Left upper arm, BP Patient Position: At rest;Lying)   Pulse 94   Temp 99.5 °F (37.5 °C)   Resp 19   Ht 5' 8\" (1.727 m)   Wt 70.8 kg (156 lb)   SpO2 97%   BMI 23.72 kg/m²      O2 Device: None (Room air)    Temp (24hrs), Av.2 °F (37.3 °C), Min:98.5 °F (36.9 °C), Max:99.7 °F (37.6 °C)      No intake/output data recorded.  1901 -  0700  In: 36 [P.O.:510; I.V.:250]  Out: -     PHYSICAL EXAM:  Constitutional: Ill-appearing. No acute distress  Skin: Extremities and face reveal no rashes. HEENT: Sclerae anicteric. PERRL. No oral ulcers. The neck is supple and no masses. Cardiovascular: Regular rate and rhythm. +S1/S2. No murmur or gallop.    Respiratory:  Clear breath sounds bilaterally with no wheezes, rales, or rhonchi. GI: Abdomen nondistended, soft, and nontender. Normal active bowel sounds. Rectal: Deferred   Musculoskeletal: No pitting edema of the lower legs. Able to move all ext  Neurological:  Patient is alert and oriented. Weakness. Cranial nerves II-XII grossly intact  Psychiatric: Mood appears appropriate       Data Review    Recent Results (from the past 24 hour(s))   CBC WITH AUTOMATED DIFF    Collection Time: 05/02/22 11:07 AM   Result Value Ref Range    WBC 9.0 3.6 - 11.0 K/uL    RBC 3.18 (L) 3.80 - 5.20 M/uL    HGB 8.0 (L) 11.5 - 16.0 g/dL    HCT 25.1 (L) 35.0 - 47.0 %    MCV 78.9 (L) 80.0 - 99.0 FL    MCH 25.2 (L) 26.0 - 34.0 PG    MCHC 31.9 30.0 - 36.5 g/dL    RDW 15.9 (H) 11.5 - 14.5 %    PLATELET 601 (H) 647 - 400 K/uL    MPV 9.6 8.9 - 12.9 FL    NRBC 0.3 (H) 0.0  WBC    ABSOLUTE NRBC 0.03 (H) 0.00 - 0.01 K/uL    NEUTROPHILS 38 32 - 75 %    BAND NEUTROPHILS 1 0 - 6 %    LYMPHOCYTES 31 12 - 49 %    MONOCYTES 26 (H) 5 - 13 %    EOSINOPHILS 0 0 - 7 %    BASOPHILS 0 0 - 1 %    MYELOCYTES 1 (H) 0 %    OTHER CELL 3 %    IMMATURE GRANULOCYTES 0 %    ABS. NEUTROPHILS 3.5 1.8 - 8.0 K/UL    ABS. LYMPHOCYTES 2.8 0.8 - 3.5 K/UL    ABS. MONOCYTES 2.3 (H) 0.0 - 1.0 K/UL    ABS. EOSINOPHILS 0.0 0.0 - 0.4 K/UL    ABS. BASOPHILS 0.0 0.0 - 0.1 K/UL    ABS. IMM.  GRANS. 0.0 K/UL    DF Manual      RBC COMMENTS Normocytic, Normochromic     METABOLIC PANEL, BASIC    Collection Time: 05/02/22 11:07 AM   Result Value Ref Range    Sodium 134 (L) 136 - 145 mmol/L    Potassium 3.5 3.5 - 5.1 mmol/L    Chloride 102 97 - 108 mmol/L    CO2 25 21 - 32 mmol/L    Anion gap 7 5 - 15 mmol/L    Glucose 117 (H) 65 - 100 mg/dL    BUN 7 6 - 20 mg/dL    Creatinine 0.71 0.55 - 1.02 mg/dL    BUN/Creatinine ratio 10 (L) 12 - 20      GFR est AA >60 >60 ml/min/1.73m2    GFR est non-AA >60 >60 ml/min/1.73m2    Calcium 9.0 8.5 - 10.1 mg/dL   PROCALCITONIN    Collection Time: 05/02/22 11:07 AM   Result Value Ref Range Procalcitonin 0.30 (H) 0 ng/mL   C REACTIVE PROTEIN, QT    Collection Time: 05/02/22 11:07 AM   Result Value Ref Range    C-Reactive protein 24.10 (H) 0.00 - 0.60 mg/dL       No orders to display       Active Problems:    Neutropenic fever ([facility] [address] (4/28/2022)        Assessment/Plan:     Neutropenic fever  Treat empirically with IV vancomycin and IV cefepime. Blood culture obtained negative's thus far  Urine culture positive for Citrobacter freundii sensitive to Bactrim  Initially hypotensive, hold home metoprolol  Oncology following  ID following     COPD  Not in exacerbation  Continue home medications.       YVONNE  Possibly secondary to dehydration secondary to poor oral intake  IVF infusion, continue to monitor  Resolved     Anemia  Blood studies revealing iron deficiencies  Started on IV and p.o. iron     Weakness  Failure to thrive  Continue oral Periactin     DVT Prophylaxis: Lovenox  Code Status: Full Code  POA/NOK:    Care Plan discussed with: patient, nursing, and daughter at bedside    Total time spent with patient: >35 minutes.

## 2022-05-02 NOTE — PROGRESS NOTES
Physician Progress Note      [facility]  [facility] #:                  [id number]  :                       1940  ADMIT DATE:       2022 5:04 PM  [address] [place] DATE:  RESPONDING  PROVIDER #:        Landen LI PA-C          QUERY TEXT:    Dear Attending,    Pt admitted with neutropenic fever. Noted documentation of sepsis in  Infectious disease consult note. If possible, please document in progress notes and discharge summary:      The medical record reflects the following:  Risk Factors: 80year old female, febrile, UTI, neutropenia  Clinical Indicators:  ID consult - Sepsis on admission due to febrile neutropenia and UTI  Ongoing fevers, WBC normalized on todays labs, hemodynamically stable  GNR isolated from urine Cx, blood Cx is pending  + right chest portacath, w/o evidence of site infection, but awaiting Bcx  WBC: 2.7-4.0-5.6-7.7  Procalcitonin: 0.42  CRP: 27.00   Urine culture -  Citrobacter freundii   Blood culture - No growth 4 days  Treatment: IV Cefepime, IVF NS, IV Vancomycin      Please email [email or web address] with any questions  Options provided:  -- sepsis confirmed present on admission  -- sepsis ruled out  -- Other - I will add my own diagnosis  -- Disagree - Not applicable / Not valid  -- Disagree - Clinically unable to determine / Unknown  -- Refer to Clinical Documentation Reviewer    PROVIDER RESPONSE TEXT:    The diagnosis of sepsis was confirmed as present on admission. Query created by: Bradley Kelly on 2022 11:03 AM      QUERY TEXT:    Dear Attending,    Patient admitted with neutropenic fever, noted to have pleural effusion. 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 This does not look much changed . Add levaquin for possible post obstructive pneumonia. 4/29 Infectious disease consult - Moderate volume right pleural effusion on CXR  4/28 CXR - New compared to prior imaging, there is obscuration of the lower one half right  hemithorax. Likely related to a combination atelectatic lung (possibly  obstructive) and moderate volume right pleural effusion. Treatment: Palliative chemo    Please email [email or web address] with any questions  Options provided:  -- Pleural effusion due to CHF  -- Hydropneumothorax  -- Malignant pleural effusion due to Extensive stageSmall cell lung cancer  -- Other - I will add my own diagnosis  -- Disagree - Not applicable / Not valid  -- Disagree - Clinically unable to determine / Unknown  -- Refer to Clinical Documentation Reviewer    PROVIDER RESPONSE TEXT:    Pleural effusion unchanged secondary to underlying lung cancer    Query created by:  Donnie Johnson on 5/2/2022 11:13 AM      Electronically signed by:  Noah Foreman PA-C 5/2/2022 1:35 PM

## 2022-05-02 NOTE — PROGRESS NOTES
Dr. Xavier Willis was called due to patient having a dry, non- productive cough. She has a new order for Tessalon Pearls 200mg PO Q8 for cough. PO fluids have been encouraged during the shift; she has been up ambulating to the bathroom X 1 assist and PRN Zofran was administered X 1 and has been effective. Daughter remains at bedside and call bell remains in place.

## 2022-05-02 NOTE — PROGRESS NOTES
Family states\" I use soap and water on my mother and she has not been feeling well; She was showed the package of CHG wipes and preferred not to use\".

## 2022-05-02 NOTE — PROGRESS NOTES
Hematology and Oncology Progress Note    Patient: Deja Burleson MRN: [id number]  SSN: [id number]    YOB: 1940  Age: 80 y.o. Sex: female      Admit Date: 4/28/2022    LOS: 4 days     Chief Complaint: Patient feels weak    Subjective:     Patient does not feel well. She feels tired and weak. No mucosal bleeding. Objective:     Vitals:    05/01/22 2006 05/02/22 0139 05/02/22 0729 05/02/22 0833   BP: 120/77 126/68 134/75    Pulse: 84 67 94    Resp: 16 18 19    Temp: 98.5 °F (36.9 °C) 98.9 °F (37.2 °C) 99.5 °F (37.5 °C)    SpO2: 98% 97%  97%   Weight:       Height:              Physical Exam:   Constitutional: Elderly female not in any acute distress or pain. Eyes: Sclerae anicteric. Conjunctivae shows pallor. ENMT: Oral mucosa is moist, no thrush, mucositis, or petechiae. Neck: No adenopathy. Respiratory: Lungs are clear bilaterally. Cardiovascular: Normal sinus rhythm. Abdomen: Soft. Nontender. No hepatosplenomegaly. No guarding or rigidity. Bowel sounds present. Extremities: No edema. Skin: No petechiae; no skin rash. Neurologic: Alert/oriented x 3. Lab/Data Review:    Recent Results (from the past 24 hour(s))   CBC WITH AUTOMATED DIFF    Collection Time: 05/02/22 11:07 AM   Result Value Ref Range    WBC 9.0 3.6 - 11.0 K/uL    RBC 3.18 (L) 3.80 - 5.20 M/uL    HGB 8.0 (L) 11.5 - 16.0 g/dL    HCT 25.1 (L) 35.0 - 47.0 %    MCV 78.9 (L) 80.0 - 99.0 FL    MCH 25.2 (L) 26.0 - 34.0 PG    MCHC 31.9 30.0 - 36.5 g/dL    RDW 15.9 (H) 11.5 - 14.5 %    PLATELET 381 (H) 530 - 400 K/uL    MPV 9.6 8.9 - 12.9 FL    NRBC 0.3 (H) 0.0  WBC    ABSOLUTE NRBC 0.03 (H) 0.00 - 0.01 K/uL    NEUTROPHILS 38 32 - 75 %    BAND NEUTROPHILS 1 0 - 6 %    LYMPHOCYTES 31 12 - 49 %    MONOCYTES 26 (H) 5 - 13 %    EOSINOPHILS 0 0 - 7 %    BASOPHILS 0 0 - 1 %    MYELOCYTES 1 (H) 0 %    OTHER CELL 3 %    IMMATURE GRANULOCYTES 0 %    ABS. NEUTROPHILS 3.5 1.8 - 8.0 K/UL    ABS.  LYMPHOCYTES 2.8 0.8 - 3.5 K/UL ABS. MONOCYTES 2.3 (H) 0.0 - 1.0 K/UL    ABS. EOSINOPHILS 0.0 0.0 - 0.4 K/UL    ABS. BASOPHILS 0.0 0.0 - 0.1 K/UL    ABS. IMM. GRANS. 0.0 K/UL    DF Manual      RBC COMMENTS Normocytic, Normochromic     METABOLIC PANEL, BASIC    Collection Time: 05/02/22 11:07 AM   Result Value Ref Range    Sodium 134 (L) 136 - 145 mmol/L    Potassium 3.5 3.5 - 5.1 mmol/L    Chloride 102 97 - 108 mmol/L    CO2 25 21 - 32 mmol/L    Anion gap 7 5 - 15 mmol/L    Glucose 117 (H) 65 - 100 mg/dL    BUN 7 6 - 20 mg/dL    Creatinine 0.71 0.55 - 1.02 mg/dL    BUN/Creatinine ratio 10 (L) 12 - 20      GFR est AA >60 >60 ml/min/1.73m2    GFR est non-AA >60 >60 ml/min/1.73m2    Calcium 9.0 8.5 - 10.1 mg/dL   PROCALCITONIN    Collection Time: 05/02/22 11:07 AM   Result Value Ref Range    Procalcitonin 0.30 (H) 0 ng/mL   C REACTIVE PROTEIN, QT    Collection Time: 05/02/22 11:07 AM   Result Value Ref Range    C-Reactive protein 24.10 (H) 0.00 - 0.60 mg/dL           Assessment and plan:       1. Extensive stageSmall cell lung cancer with brain mets. :  - S/p gammaknife treatment for brain mets on 4/19/22. - Now receiving palliative chemo with carboplatin/etoposide and teccentriq. Last chemo on 4/12-4/14.      2) Neutropenic fever: Urine culture is growing citrobacter freundii. Cefepime susceptible. levaquin intermediate. U/a positive for LE. Port site looks good.    -Patient's WBC are 9000 with ANC of 3500. Patient is no longer neutropenic.     3) Anemia: likely secondary to chemo. No need for transfusions  -Functional iron deficiency with low iron sats. Start her on oral iron  - normal X54 folic acid levels iron studies showing   -Patient's hemoglobin is slightly better at 8 today. No need for transfusion at this point.    4) continue dvt prophylaxis with lovenox. This dictation was done by dragon, computer voice recognition software.   Often unanticipated grammatical, syntax, phones and other interpretive errors are inadvertently transcribed. Please excuse errors that have escaped final proofreading.        Signed By: Zheng Flores MD     May 2, 2022

## 2022-05-03 ENCOUNTER — TELEPHONE (OUTPATIENT)
Dept: PRIMARY CARE CLINIC | Age: 82
End: 2022-05-03

## 2022-05-03 VITALS
OXYGEN SATURATION: 97 % | BODY MASS INDEX: 23.64 KG/M2 | TEMPERATURE: 98.5 F | HEART RATE: 83 BPM | WEIGHT: 156 LBS | RESPIRATION RATE: 16 BRPM | DIASTOLIC BLOOD PRESSURE: 64 MMHG | HEIGHT: 68 IN | SYSTOLIC BLOOD PRESSURE: 114 MMHG

## 2022-05-03 LAB
ANION GAP SERPL CALC-SCNC: 5 MMOL/L (ref 5–15)
BASOPHILS # BLD: 0 K/UL (ref 0–0.1)
BASOPHILS NFR BLD: 0 % (ref 0–1)
BUN SERPL-MCNC: 6 MG/DL (ref 6–20)
BUN/CREAT SERPL: 12 (ref 12–20)
CA-I BLD-MCNC: 8.7 MG/DL (ref 8.5–10.1)
CHLORIDE SERPL-SCNC: 102 MMOL/L (ref 97–108)
CO2 SERPL-SCNC: 28 MMOL/L (ref 21–32)
CREAT SERPL-MCNC: 0.5 MG/DL (ref 0.55–1.02)
DIFFERENTIAL METHOD BLD: ABNORMAL
EOSINOPHIL # BLD: 0 K/UL (ref 0–0.4)
EOSINOPHIL NFR BLD: 0 % (ref 0–7)
ERYTHROCYTE [DISTWIDTH] IN BLOOD BY AUTOMATED COUNT: 16.4 % (ref 11.5–14.5)
GLUCOSE SERPL-MCNC: 94 MG/DL (ref 65–100)
HCT VFR BLD AUTO: 25.5 % (ref 35–47)
HGB BLD-MCNC: 8.1 G/DL (ref 11.5–16)
IMM GRANULOCYTES # BLD AUTO: 0 K/UL
IMM GRANULOCYTES NFR BLD AUTO: 0 %
LYMPHOCYTES # BLD: 2.4 K/UL (ref 0.8–3.5)
LYMPHOCYTES NFR BLD: 24 % (ref 12–49)
MCH RBC QN AUTO: 24.9 PG (ref 26–34)
MCHC RBC AUTO-ENTMCNC: 31.8 G/DL (ref 30–36.5)
MCV RBC AUTO: 78.5 FL (ref 80–99)
METAMYELOCYTES NFR BLD MANUAL: 9 %
MONOCYTES # BLD: 1.6 K/UL (ref 0–1)
MONOCYTES NFR BLD: 16 % (ref 5–13)
MYELOCYTES NFR BLD MANUAL: 5 %
NEUTS BAND NFR BLD MANUAL: 2 % (ref 0–6)
NEUTS SEG # BLD: 4.6 K/UL (ref 1.8–8)
NEUTS SEG NFR BLD: 44 % (ref 32–75)
NRBC # BLD: 0.04 K/UL (ref 0–0.01)
NRBC BLD-RTO: 0.4 PER 100 WBC
PLATELET # BLD AUTO: 588 K/UL (ref 150–400)
PLATELET COMMENTS,PCOM: ABNORMAL
PMV BLD AUTO: 9.4 FL (ref 8.9–12.9)
POTASSIUM SERPL-SCNC: 3.7 MMOL/L (ref 3.5–5.1)
RBC # BLD AUTO: 3.25 M/UL (ref 3.8–5.2)
RBC MORPH BLD: ABNORMAL
SODIUM SERPL-SCNC: 135 MMOL/L (ref 136–145)
WBC # BLD AUTO: 10 K/UL (ref 3.6–11)
WBC MORPH BLD: ABNORMAL

## 2022-05-03 PROCEDURE — 74011000250 HC RX REV CODE- 250: Performed by: INTERNAL MEDICINE

## 2022-05-03 PROCEDURE — 74011250637 HC RX REV CODE- 250/637: Performed by: NURSE PRACTITIONER

## 2022-05-03 PROCEDURE — 74011250636 HC RX REV CODE- 250/636: Performed by: STUDENT IN AN ORGANIZED HEALTH CARE EDUCATION/TRAINING PROGRAM

## 2022-05-03 PROCEDURE — 99232 SBSQ HOSP IP/OBS MODERATE 35: CPT | Performed by: INTERNAL MEDICINE

## 2022-05-03 PROCEDURE — 74011250637 HC RX REV CODE- 250/637: Performed by: INTERNAL MEDICINE

## 2022-05-03 PROCEDURE — 85025 COMPLETE CBC W/AUTO DIFF WBC: CPT

## 2022-05-03 PROCEDURE — 74011250636 HC RX REV CODE- 250/636: Performed by: INTERNAL MEDICINE

## 2022-05-03 PROCEDURE — 80048 BASIC METABOLIC PNL TOTAL CA: CPT

## 2022-05-03 PROCEDURE — 94640 AIRWAY INHALATION TREATMENT: CPT

## 2022-05-03 PROCEDURE — 36415 COLL VENOUS BLD VENIPUNCTURE: CPT

## 2022-05-03 PROCEDURE — 74011000258 HC RX REV CODE- 258: Performed by: INTERNAL MEDICINE

## 2022-05-03 RX ORDER — SULFAMETHOXAZOLE AND TRIMETHOPRIM 800; 160 MG/1; MG/1
1 TABLET ORAL 2 TIMES DAILY
Qty: 8 TABLET | Refills: 0 | Status: SHIPPED | OUTPATIENT
Start: 2022-05-03 | End: 2022-05-07

## 2022-05-03 RX ORDER — SULFAMETHOXAZOLE AND TRIMETHOPRIM 800; 160 MG/1; MG/1
1 TABLET ORAL 2 TIMES DAILY
Qty: 12 TABLET | Refills: 0 | Status: SHIPPED | OUTPATIENT
Start: 2022-05-03 | End: 2022-05-03 | Stop reason: SDUPTHER

## 2022-05-03 RX ADMIN — CYPROHEPTADINE HYDROCHLORIDE 4 MG: 4 TABLET ORAL at 08:53

## 2022-05-03 RX ADMIN — FERROUS SULFATE TAB 325 MG (65 MG ELEMENTAL FE) 325 MG: 325 (65 FE) TAB at 08:53

## 2022-05-03 RX ADMIN — TRAMADOL HYDROCHLORIDE 50 MG: 50 TABLET, COATED ORAL at 08:53

## 2022-05-03 RX ADMIN — CEFEPIME 1 G: 1 INJECTION, POWDER, FOR SOLUTION INTRAMUSCULAR; INTRAVENOUS at 05:50

## 2022-05-03 RX ADMIN — IRON SUCROSE 100 MG: 20 INJECTION, SOLUTION INTRAVENOUS at 09:34

## 2022-05-03 RX ADMIN — ENOXAPARIN SODIUM 40 MG: 100 INJECTION SUBCUTANEOUS at 08:53

## 2022-05-03 RX ADMIN — SODIUM CHLORIDE, PRESERVATIVE FREE 10 ML: 5 INJECTION INTRAVENOUS at 05:50

## 2022-05-03 RX ADMIN — TIOTROPIUM BROMIDE INHALATION SPRAY 2 PUFF: 3.12 SPRAY, METERED RESPIRATORY (INHALATION) at 08:55

## 2022-05-03 NOTE — TELEPHONE ENCOUNTER
----- Message from Renata Gonzales sent at 5/3/2022 12:22 PM EDT -----  Subject: Appointment Request    Reason for Call: Routine Hospital Follow Up    QUESTIONS  Type of Appointment? Established Patient  Reason for appointment request? No appointments available during search  Additional Information for Provider? PT IS BEING DISCHARGED TODAY 5-3-202   FROM [facility] NEEDS F/U APPT ,NONE AVAILABLE   ---------------------------------------------------------------------------  --------------  CALL BACK INFO  What is the best way for the office to contact you? OK to leave message on   voicemail  Preferred Call Back Phone Number? [phone]  ---------------------------------------------------------------------------  --------------  SCRIPT ANSWERS  Relationship to Patient? Third Party  Third Party Type? Hospital?   Representative Name? JEREMÍAS  (Patient requests to see provider urgently. )? No  (Has the patient been discharged from the hospital within 2 business days   AND does not have a Telephone Encounter  Follow Up From [address] [place] [address]   documented in [address]?)? No  Do you have any questions for your primary care provider that need to be   answered prior to your appointment? (Use RN Triage if question pertains to   anything on the red flag list)? No  (Patient needs follow up visit after hospital discharge) Book first   available appointment within 7 days OF DISCHARGE, if no appt, proceed to   book the next available time slot within 14 days OF DISCHARGE AND Send   Message to Provider. [facility] Follow Up appointment cannot be booked   beyond 14 Days and should result in a Message to Provider. ? Yes   Have you been diagnosed with, awaiting test results for, or told that you   are suspected of having COVID-19 (Coronavirus)? (If patient has tested   negative or was tested as a requirement for work, school, or travel and   not based on symptoms, answer no)?  No  Within the past 10 days have you developed any of the following symptoms   (answer no if symptoms have been present longer than 10 days or began   more than 10 days ago)? Fever or Chills, Cough, Shortness of breath or   difficulty breathing, Loss of taste or smell, Sore throat, Nasal   congestion, Sneezing or runny nose, Fatigue or generalized body aches   (answer no if pain is specific to a body part e.g. back pain), Diarrhea,   Headache? No  Have you had close contact with someone with COVID-19 in the last 7 days? No  (Service Expert  click yes below to proceed with [employer] As Usual   Scheduling)?  Yes

## 2022-05-03 NOTE — PROGRESS NOTES
DC Plan: [facility] ([facility]: 5/5/22)       Bisi Hensley for hospital bed, bedside commode, and rolator    [facility] is out of stock for hospital beds, however they had bed side commodes. Cm sent a referral to [address] [place] [address]. Cm received a phone call from [facility] yesterday afternoon. [facility] indicated they do have hospital beds and bedside commodes. Karlene asked CM for fax# to fax CMN. CM cancelled referral with [facility].    Cm received a phone call from [facility] with Bisi Hensley this morning. [facility] indicated she faxed the CMN to writer yesterday. Cm informed Karlene MEADOWS did receive the CMN. [facility] indicated they spoke with pt's son. Anticipate delivery for hospital bed and bedside commode will be tomorrow. Attending signed the CMN. CM faxed it back to Bisi Hensley. CM met with pt and pt's daughter at the bedside. CM provided daughter with contact information for [facility]. Daughter would like to obtain a rolator for pt. Cm will get in contact with Bisi Hensley. Daughter would like to know if insurance will cover the rolator. Cm spoke with attending to obtain an order for a rolator. Cm called [employer]isi Hensley and spoke with [facility]. Cm informed [facility] pt will need a rolator. Cm questioned [facility] if the rolator is covered by insurance. [facility] indicated it should be, however she states she will run pt's insurance to see if it is covered or not. Cm asked [facility] to call patient's daughter if it not covered by insurance. Cm met with pt and pt's daughter at the bedside. Cm provided daughter with an update regarding the rolator. Cm spoke with pt's nurse and updated her on pt's dc. Cm called [facility] and notified them of discharge. Cm received a resumption for care for this Thursday (5/5/22). Discharge plan of care/case management plan validated with provider's discharge order.

## 2022-05-03 NOTE — DISCHARGE SUMMARY
Hospitalist Discharge Summary     Patient ID:    Paula Norris  [id number]  80 y.o.  1940    Admit date: 4/28/2022    Discharge date : 5/3/2022    Chronic Diagnoses:    Problem List as of 5/3/2022 Date Reviewed: 4/15/2022          Codes Class Noted - Resolved    UTI (urinary tract infection) ICD-10-CM: N39.0  ICD-9-CM: 599.0  5/2/2022 - Present        * (Principal) Neutropenic fever ([facility] ICD-10-CM: D70.9, R50.81  ICD-9-CM: 288.00, 780.61  4/28/2022 - Present        Febrile neutropenia ([facility] [address] ICD-10-CM: D70.9, R50.81  ICD-9-CM: 288.00, 780.61  4/28/2022 - Present        Cancer of overlapping sites of right lung [facility]) ICD-10-CM: C34.81  ICD-9-CM: 162.8  3/31/2022 - Present        Lung mass ICD-10-CM: R91.8  ICD-9-CM: 786.6  3/8/2022 - Present        At high risk for falls ICD-10-CM: Z91.81  ICD-9-CM: V15.88  2/7/2021 - Present        GERD (gastroesophageal reflux disease) ICD-10-CM: K21.9  ICD-9-CM: 530.81  8/28/2020 - Present        Hypertensive disorder ICD-10-CM: I10  ICD-9-CM: 401.9  8/28/2020 - Present          22    Final Diagnoses:   Principal Problem:    Neutropenic fever ([facility] [address] (4/28/2022)    Active Problems:    UTI (urinary tract infection) (5/2/2022)        Hospital Course:   Oanh Pulido is an 80year-old female with PMHx of lung cancer with brain mets on chemotherapy and radiotherapy, COPD, and hypertension who presented to the ED after being found to have neutropenic fever and with fevers. Reports overall body weakness and diminished appetite. Admits to shortness of breath with productive cough, but reports that this is her baseline and has not worsened. No urinary frequency, burning, or urgency. No nausea, vomiting or diarrhea.  Chemoport on right chest not tender.    In the ED, temp of 100.7.  Significant labs on admission creatinine elevated from previous admission at 1.27, sodium 131, chloride 96, WBC 2.7, hemoglobin 8.8, UA positive for pyuria bacteria and leukocyte esterase.  Admitted for further evaluation and management neutropenic fevers.  ID and oncology consulted.  Blood culture negative. Urine culture growing Citrobacter freundii susceptible to IV cefepime. Continue on cefepime IV. Also susceptible to oral Bactrim, which will be started at discharge. Iron studies revealing iron deficiency we will start on oral and IV replacement while inpatient. Patients WBC 9.0 and ANC 3.5, no longer neutropenic. Close outpatient follow-up with hematology/oncology at discharge. The patient has a medical condition which requires positioning of the body in ways not feasible with an ordinary bed and need for frequent changes in body position. CM to arrange hospital bed. Patient is confined to a single room and would benefit from bedside commode. Discharge Medications:   Current Discharge Medication List      START taking these medications    Details   ferrous sulfate 325 mg (65 mg iron) tablet Take 1 Tablet by mouth daily (with breakfast). Qty: 30 Tablet, Refills: 1  Start date: 5/3/2022      trimethoprim-sulfamethoxazole (Bactrim DS) 160-800 mg per tablet Take 1 Tablet by mouth two (2) times a day for 6 days. Qty: 12 Tablet, Refills: 0  Start date: 5/3/2022, End date: 5/9/2022         CONTINUE these medications which have NOT CHANGED    Details   meclizine (ANTIVERT) 25 mg tablet Take 1 Tablet by mouth three (3) times daily as needed for Dizziness. Qty: 30 Tablet, Refills: 0    Associated Diagnoses: Vertigo      losartan (COZAAR) 25 mg tablet Take 1 Tablet by mouth daily. Qty: 90 Tablet, Refills: 0    Associated Diagnoses: Primary hypertension      metoprolol succinate (TOPROL-XL) 25 mg XL tablet Take 0.5 Tablets by mouth daily. Qty: 90 Tablet, Refills: 0    Associated Diagnoses: Primary hypertension      cyproheptadine (PERIACTIN) 4 mg tablet Take 1 Tablet by mouth three (3) times daily as needed for PRN Reason (Other) (decreased appetite).   Qty: 90 Tablet, Refills: 0 Associated Diagnoses: Decreased appetite      tiotropium (Spiriva with HandiHaler) 18 mcg inhalation capsule Take 1 Capsule by inhalation daily. benzonatate (TESSALON) 200 mg capsule Take 200 mg by mouth three (3) times daily as needed for Cough. 1 CAP TID AS NEEDED FOR COUGH      albuterol (PROVENTIL HFA, VENTOLIN HFA, PROAIR HFA) 90 mcg/actuation inhaler Take 1 Puff by inhalation every four (4) hours as needed for Wheezing. Qty: 18 g, Refills: 1    Associated Diagnoses: COVID-19; Persistent cough               Follow up Care:    1. Lorrie Argueta NP in 1-2 weeks. Follow-up Information     Follow up With Specialties Details Why Contact Info    Lorrie Argueta NP Physician Assistant Schedule an appointment as soon as possible for a visit in 1 week Acute on chronic constipation. UTI. [address] [place] [address]      Sona Kline MD Internal Medicine Schedule an appointment as soon as possible for a visit in 1 week Hospital follow-up neutropenic fever. Recently dx NSCLC. [facility] [id number]  [phone]              Patient Follow Up Instructions: Activity: PT/OT per Home Health  Diet:  Resume previous diet    Condition at Discharge:  Stable  __________________________________________________________________    Disposition  [address] [facility]  ____________________________________________________________________    Code Status:  Full Code  ___________________________________________________________________    Discharge Exam:  Patient seen and examined by me on discharge day. Pertinent Findings:    Gen:    Not in distress  Chest: Clear lungs without wheezing, rhonchi, or rales. Room air. CVS:   Regular rate and rhythm. +S1/S2. No murmur or gallop. No edema  Abd:  Soft, not distended, not tender. Active bowel sounds. Neuro:  Alert and oriented. CN II-XII grossly intact.   Psych: Mood appropriate    CONSULTATIONS: ID and Hematology/Oncology    Significant Diagnostic Studies:   Recent Results (from the past 24 hour(s))   CBC WITH AUTOMATED DIFF    Collection Time: 05/02/22 11:07 AM   Result Value Ref Range    WBC 9.0 3.6 - 11.0 K/uL    RBC 3.18 (L) 3.80 - 5.20 M/uL    HGB 8.0 (L) 11.5 - 16.0 g/dL    HCT 25.1 (L) 35.0 - 47.0 %    MCV 78.9 (L) 80.0 - 99.0 FL    MCH 25.2 (L) 26.0 - 34.0 PG    MCHC 31.9 30.0 - 36.5 g/dL    RDW 15.9 (H) 11.5 - 14.5 %    PLATELET 027 (H) 610 - 400 K/uL    MPV 9.6 8.9 - 12.9 FL    NRBC 0.3 (H) 0.0  WBC    ABSOLUTE NRBC 0.03 (H) 0.00 - 0.01 K/uL    NEUTROPHILS 38 32 - 75 %    BAND NEUTROPHILS 1 0 - 6 %    LYMPHOCYTES 31 12 - 49 %    MONOCYTES 26 (H) 5 - 13 %    EOSINOPHILS 0 0 - 7 %    BASOPHILS 0 0 - 1 %    MYELOCYTES 1 (H) 0 %    OTHER CELL 3 %    IMMATURE GRANULOCYTES 0 %    ABS. NEUTROPHILS 3.5 1.8 - 8.0 K/UL    ABS. LYMPHOCYTES 2.8 0.8 - 3.5 K/UL    ABS. MONOCYTES 2.3 (H) 0.0 - 1.0 K/UL    ABS. EOSINOPHILS 0.0 0.0 - 0.4 K/UL    ABS. BASOPHILS 0.0 0.0 - 0.1 K/UL    ABS. IMM.  GRANS. 0.0 K/UL    DF Manual      RBC COMMENTS Normocytic, Normochromic     METABOLIC PANEL, BASIC    Collection Time: 05/02/22 11:07 AM   Result Value Ref Range    Sodium 134 (L) 136 - 145 mmol/L    Potassium 3.5 3.5 - 5.1 mmol/L    Chloride 102 97 - 108 mmol/L    CO2 25 21 - 32 mmol/L    Anion gap 7 5 - 15 mmol/L    Glucose 117 (H) 65 - 100 mg/dL    BUN 7 6 - 20 mg/dL    Creatinine 0.71 0.55 - 1.02 mg/dL    BUN/Creatinine ratio 10 (L) 12 - 20      GFR est AA >60 >60 ml/min/1.73m2    GFR est non-AA >60 >60 ml/min/1.73m2    Calcium 9.0 8.5 - 10.1 mg/dL   PROCALCITONIN    Collection Time: 05/02/22 11:07 AM   Result Value Ref Range    Procalcitonin 0.30 (H) 0 ng/mL   C REACTIVE PROTEIN, QT    Collection Time: 05/02/22 11:07 AM   Result Value Ref Range    C-Reactive protein 24.10 (H) 0.00 - 9.62 mg/dL   METABOLIC PANEL, BASIC    Collection Time: 05/03/22  7:33 AM   Result Value Ref Range    Sodium 135 (L) 136 - 145 mmol/L Potassium 3.7 3.5 - 5.1 mmol/L    Chloride 102 97 - 108 mmol/L    CO2 28 21 - 32 mmol/L    Anion gap 5 5 - 15 mmol/L    Glucose 94 65 - 100 mg/dL    BUN 6 6 - 20 mg/dL    Creatinine 0.50 (L) 0.55 - 1.02 mg/dL    BUN/Creatinine ratio 12 12 - 20      GFR est AA >60 >60 ml/min/1.73m2    GFR est non-AA >60 >60 ml/min/1.73m2    Calcium 8.7 8.5 - 10.1 mg/dL     No orders to display           Signed:  Tiarra Zeng PA-C  5/3/2022  8:43 AM

## 2022-05-03 NOTE — ROUTINE PROCESS
Bedside shift report given to Rhett Mendez RN  (oncoming nurse) by Vianney Parada RN (off going nurse). Report to include SBAR, plan of care, recent results, discharge planning, and patient's questions and concerns.

## 2022-05-03 NOTE — PROGRESS NOTES
Infectious Disease Progress Note           Subjective:   Assessed pt at bedside, doing well, denies new complaints, no acute events since last seen   Objective:   Physical Exam:     Visit Vitals  /64 (BP 1 Location: Left lower arm, BP Patient Position: At rest)   Pulse 83   Temp 98.5 °F (36.9 °C)   Resp 16   Ht 5' 8\" (1.727 m)   Wt 156 lb (70.8 kg)   SpO2 97%   BMI 23.72 kg/m²      O2 Device: None (Room air)    Temp (24hrs), Av °F (37.2 °C), Min:98.5 °F (36.9 °C), Max:99.7 °F (37.6 °C)    No intake/output data recorded.     1901 -  0700  In: 710 [P.O.:410; I.V.:300]  Out: -     General: NAD, AAO x 4  HEENT: ANTONIO, Moist mucosa   Lungs: CTA b/l, decreased at the bases, no wheeze/rhonchi   Heart: S1S2+, RRR, no murmur  Abdo: Soft, NT, ND, +BS   : No hameed cath   Exts: No edema, + pulses b/l   Skin: right chest portacath     Data Review:       Recent Days:  Recent Labs     22  0733 22  1107 22  1118   WBC 10.0 9.0 7.7   HGB 8.1* 8.0* 7.8*   HCT 25.5* 25.1* 24.2*   * 580* 572*     Recent Labs     22  0733 22  1107 22  1118   BUN 6 7 9   CREA 0.50* 0.71 0.77       Lab Results   Component Value Date/Time    C-Reactive protein 24.10 (H) 2022 11:07 AM          Microbiology     Results     Procedure Component Value Units Date/Time    CULTURE, BLOOD [[id number]] Collected: 22    Order Status: Completed Specimen: Blood Updated: 22     Special Requests: No Special Requests        Culture result: No growth 5 days       CULTURE, URINE [[id number]]  (Abnormal)  (Susceptibility) Collected: 22    Order Status: Completed Specimen: Urine Updated: 22 1212     Special Requests: No Special Requests        [place] Count --        >100,000  colonies/ml       Culture result: Citrobacter freundii       Susceptibility      Citrobacter freundii     DESTINEE     Amikacin ($) Susceptible     Cefazolin ($) Resistant Cefepime ($$) Susceptible     Cefoxitin Resistant     Ceftazidime ($) Susceptible     Ceftriaxone ($) Susceptible     Ciprofloxacin ($) Intermediate  [1]      Gentamicin ($) Susceptible     Levofloxacin ($) Intermediate  [2]      Meropenem ($$) Susceptible     Nitrofurantoin Susceptible     Piperacillin/Tazobac ($) Susceptible     Tobramycin ($) Susceptible     Trimeth/Sulfa Susceptible                 [1]  FDA**FDA INTERPRETATION REFLECTED, REFER TO CLSI FOR ALTERNATE  INTERPRETATIONS.**L     [2]  **FDA INTERPRETATION REFLECTED, REFER TO CLSI FOR ALTERNATE  INTERPRETATIONS.**          Linear View                            Diagnostics   CXR Results  (Last 48 hours)    None             Assessment/Plan     1. UTI, Citrobacter freundii isolated from Cx I to quinolones       Reports urinary incontinence, and poor fluid intake which may be predisposing to UTI       Afebrile, WBC WNLs. Continue on Cefepime while hospitalized       On day # 6/10 of GNR coverage, currently Cefepime, may transition to oral bactrim DS upon d/c for the remainder of therapy     2. Febrile neutropenia: S/p recent chemo for lung Ca. WBC normalized, w/o G-csf, wbc up to 10.0 on todays labs     3. Sepsis on admission, clinically better, afebrile, hemodynamically stable       Mild rise in WBC on todays labs. Continue on Cefepime      4. 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  Moderate volume right pleural effusion on CXR, may need right thoracentesis if pt becomes symptomatic     Ashley Diallo MD    5/3/2022

## 2022-05-03 NOTE — PROGRESS NOTES
I have reviewed discharge instructions with the patient and patient's daughter.   Family received paperwork

## 2022-05-04 LAB
BACTERIA SPEC CULT: NORMAL
SPECIAL REQUESTS,SREQ: NORMAL

## 2022-05-05 ENCOUNTER — TELEPHONE (OUTPATIENT)
Dept: PRIMARY CARE CLINIC | Age: 82
End: 2022-05-05

## 2022-05-11 ENCOUNTER — OFFICE VISIT (OUTPATIENT)
Dept: PRIMARY CARE CLINIC | Age: 82
End: 2022-05-11
Payer: MEDICARE

## 2022-05-11 VITALS
TEMPERATURE: 97.5 F | BODY MASS INDEX: 21.79 KG/M2 | SYSTOLIC BLOOD PRESSURE: 120 MMHG | WEIGHT: 143.8 LBS | OXYGEN SATURATION: 95 % | DIASTOLIC BLOOD PRESSURE: 60 MMHG | HEIGHT: 68 IN | RESPIRATION RATE: 18 BRPM | HEART RATE: 81 BPM

## 2022-05-11 DIAGNOSIS — N39.0 URINARY TRACT INFECTION WITHOUT HEMATURIA, SITE UNSPECIFIED: ICD-10-CM

## 2022-05-11 DIAGNOSIS — R50.81 NEUTROPENIC FEVER (HCC): ICD-10-CM

## 2022-05-11 DIAGNOSIS — D64.9 ANEMIA, UNSPECIFIED TYPE: ICD-10-CM

## 2022-05-11 DIAGNOSIS — J44.9 CHRONIC OBSTRUCTIVE PULMONARY DISEASE, UNSPECIFIED COPD TYPE (HCC): ICD-10-CM

## 2022-05-11 DIAGNOSIS — R63.0 DECREASED APPETITE: ICD-10-CM

## 2022-05-11 DIAGNOSIS — C34.81 CANCER OF OVERLAPPING SITES OF RIGHT LUNG (HCC): ICD-10-CM

## 2022-05-11 DIAGNOSIS — K59.00 CONSTIPATION, UNSPECIFIED CONSTIPATION TYPE: ICD-10-CM

## 2022-05-11 DIAGNOSIS — D70.9 NEUTROPENIC FEVER (HCC): ICD-10-CM

## 2022-05-11 DIAGNOSIS — I10 PRIMARY HYPERTENSION: ICD-10-CM

## 2022-05-11 DIAGNOSIS — Z09 HOSPITAL DISCHARGE FOLLOW-UP: Primary | ICD-10-CM

## 2022-05-11 PROCEDURE — 1101F PT FALLS ASSESS-DOCD LE1/YR: CPT | Performed by: NURSE PRACTITIONER

## 2022-05-11 PROCEDURE — G8754 DIAS BP LESS 90: HCPCS | Performed by: NURSE PRACTITIONER

## 2022-05-11 PROCEDURE — 99214 OFFICE O/P EST MOD 30 MIN: CPT | Performed by: NURSE PRACTITIONER

## 2022-05-11 PROCEDURE — G8400 PT W/DXA NO RESULTS DOC: HCPCS | Performed by: NURSE PRACTITIONER

## 2022-05-11 PROCEDURE — G8752 SYS BP LESS 140: HCPCS | Performed by: NURSE PRACTITIONER

## 2022-05-11 PROCEDURE — 1090F PRES/ABSN URINE INCON ASSESS: CPT | Performed by: NURSE PRACTITIONER

## 2022-05-11 PROCEDURE — G8432 DEP SCR NOT DOC, RNG: HCPCS | Performed by: NURSE PRACTITIONER

## 2022-05-11 PROCEDURE — G8427 DOCREV CUR MEDS BY ELIG CLIN: HCPCS | Performed by: NURSE PRACTITIONER

## 2022-05-11 PROCEDURE — G8420 CALC BMI NORM PARAMETERS: HCPCS | Performed by: NURSE PRACTITIONER

## 2022-05-11 PROCEDURE — G8536 NO DOC ELDER MAL SCRN: HCPCS | Performed by: NURSE PRACTITIONER

## 2022-05-11 PROCEDURE — 1111F DSCHRG MED/CURRENT MED MERGE: CPT | Performed by: NURSE PRACTITIONER
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
## 2022-05-11 NOTE — PROGRESS NOTES
Chief Complaint   Patient presents with   [facility] Follow Up    Constipation     Follow up     1. Have you been to the ER, urgent care clinic since your last visit? Hospitalized since your last visit?[facility]    2. Have you seen or consulted any other health care providers outside of the [address] [place] [address] since your last visit? Include any pap smears or colon screening.  No

## 2022-05-14 ENCOUNTER — APPOINTMENT (OUTPATIENT)
Dept: CT IMAGING | Age: 82
End: 2022-05-14
Attending: EMERGENCY MEDICINE
Payer: MEDICARE

## 2022-05-14 ENCOUNTER — HOSPITAL ENCOUNTER (EMERGENCY)
Age: 82
Discharge: HOME | End: 2022-05-14
Attending: EMERGENCY MEDICINE
Payer: MEDICARE

## 2022-05-14 VITALS
HEART RATE: 85 BPM | OXYGEN SATURATION: 99 % | SYSTOLIC BLOOD PRESSURE: 137 MMHG | BODY MASS INDEX: 28.07 KG/M2 | RESPIRATION RATE: 18 BRPM | HEIGHT: 60 IN | WEIGHT: 143 LBS | DIASTOLIC BLOOD PRESSURE: 72 MMHG | TEMPERATURE: 98 F

## 2022-05-14 DIAGNOSIS — K59.01 SLOW TRANSIT CONSTIPATION: Primary | ICD-10-CM

## 2022-05-14 LAB
ALBUMIN SERPL-MCNC: 2.8 G/DL (ref 3.5–5)
ALBUMIN/GLOB SERPL: 0.6 {RATIO} (ref 1.1–2.2)
ALP SERPL-CCNC: 82 U/L (ref 45–117)
ALT SERPL-CCNC: 17 U/L (ref 12–78)
ANION GAP SERPL CALC-SCNC: 10 MMOL/L (ref 5–15)
AST SERPL W P-5'-P-CCNC: 20 U/L (ref 15–37)
BASOPHILS # BLD: 0 K/UL (ref 0–0.2)
BASOPHILS NFR BLD: 1 % (ref 0–2.5)
BILIRUB SERPL-MCNC: 0.6 MG/DL (ref 0.2–1)
BUN SERPL-MCNC: 13 MG/DL (ref 6–20)
BUN/CREAT SERPL: 15 (ref 12–20)
CA-I BLD-MCNC: 9.9 MG/DL (ref 8.5–10.1)
CHLORIDE SERPL-SCNC: 99 MMOL/L (ref 97–108)
CO2 SERPL-SCNC: 27 MMOL/L (ref 21–32)
CREAT SERPL-MCNC: 0.86 MG/DL (ref 0.55–1.02)
EOSINOPHIL # BLD: 0 K/UL (ref 0–0.7)
EOSINOPHIL NFR BLD: 1 % (ref 0.9–2.9)
ERYTHROCYTE [DISTWIDTH] IN BLOOD BY AUTOMATED COUNT: 18.4 % (ref 11.5–14.5)
GLOBULIN SER CALC-MCNC: 4.8 G/DL (ref 2–4)
GLUCOSE SERPL-MCNC: 101 MG/DL (ref 65–100)
HCT VFR BLD AUTO: 27.4 % (ref 36–46)
HGB BLD-MCNC: 8.9 G/DL (ref 13.5–17.5)
LYMPHOCYTES # BLD: 2.2 K/UL (ref 1–4.8)
LYMPHOCYTES NFR BLD: 36 % (ref 20.5–51.1)
MCH RBC QN AUTO: 25.1 PG (ref 31–34)
MCHC RBC AUTO-ENTMCNC: 32.3 G/DL (ref 31–36)
MCV RBC AUTO: 77.7 FL (ref 80–100)
MONOCYTES # BLD: 0.1 K/UL (ref 0.2–2.4)
MONOCYTES NFR BLD: 1 % (ref 1.7–9.3)
NEUTS SEG # BLD: 3.9 K/UL (ref 1.8–7.7)
NEUTS SEG NFR BLD: 61 % (ref 42–75)
NRBC # BLD: 0 K/UL
NRBC BLD-RTO: 0 PER 100 WBC
PLATELET # BLD AUTO: 649 K/UL (ref 150–400)
PMV BLD AUTO: 7.3 FL (ref 6.5–11.5)
POTASSIUM SERPL-SCNC: 4.7 MMOL/L (ref 3.5–5.1)
PROT SERPL-MCNC: 7.6 G/DL (ref 6.4–8.2)
RBC # BLD AUTO: 3.53 M/UL (ref 4.5–5.9)
SODIUM SERPL-SCNC: 136 MMOL/L (ref 136–145)
WBC # BLD AUTO: 6.3 K/UL (ref 4.4–11.3)

## 2022-05-14 PROCEDURE — 36415 COLL VENOUS BLD VENIPUNCTURE: CPT

## 2022-05-14 PROCEDURE — 74176 CT ABD & PELVIS W/O CONTRAST: CPT

## 2022-05-14 PROCEDURE — 80053 COMPREHEN METABOLIC PANEL: CPT

## 2022-05-14 PROCEDURE — 85025 COMPLETE CBC W/AUTO DIFF WBC: CPT

## 2022-05-14 PROCEDURE — 99284 EMERGENCY DEPT VISIT MOD MDM: CPT

## 2022-05-14 NOTE — DISCHARGE INSTRUCTIONS
Begin Miralax or generic - one dose per hour x 3 today, then daily as needed. Also CT reveals large amount of stool in rectum - use OTC enema or glycerin suppository today.
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
## 2022-05-14 NOTE — ED TRIAGE NOTES
SAW ONCOLOGIST Thursday AND STATED IF DID NOT HAVE A NORMAL BOWEL MOVEMENTS TO COME INTO THE ER. NO ABDOMINAL PAIN PER PATIENT. NO FORMED BOWEL MOVEMENTS. FEELING OF FULLNESS. STATES ABDOMEN IS DISTENDED. LIQUID STOOL WHEN ATTEMPTING TO HAVE A BOWEL MOVEMENT. CURRENT CHEMO PATIENT. ISIDRA CATH IN RIGHT CHEST WALL.

## 2022-05-20 ENCOUNTER — TELEPHONE (OUTPATIENT)
Dept: PRIMARY CARE CLINIC | Age: 82
End: 2022-05-20

## 2022-05-20 DIAGNOSIS — C34.81 CANCER OF OVERLAPPING SITES OF RIGHT LUNG (HCC): Primary | ICD-10-CM

## 2022-05-20 NOTE — TELEPHONE ENCOUNTER
Spoke with Daughter and reports that she was told that this was the only way to get her help caring for mother. At this time, patient is wanting to continue to pursue active treatment and not been given a diagnosis of less than 6 months. Did discuss that I could contact hospice to at least have discussion about their purpose and what does and doesn't meet criteria but she declined for now.

## 2022-05-20 NOTE — TELEPHONE ENCOUNTER
Yasir Hauser from [facility] called and stated patient's daughter Gene Hernandez) requested Hospice care for patient due to the decline in her health.   She is requesting [facility] in [place], [address].

## 2022-06-01 PROBLEM — N39.0 UTI (URINARY TRACT INFECTION): Status: RESOLVED | Noted: 2022-05-02 | Resolved: 2022-06-01

## 2022-07-08 ENCOUNTER — TRANSCRIBE ORDER (OUTPATIENT)
Dept: SCHEDULING | Age: 82
End: 2022-07-08

## 2022-07-08 DIAGNOSIS — C34.81 MALIGNANT NEOPLASM OF OVERLAPPING SITES OF RIGHT LUNG (HCC): Primary | ICD-10-CM

## 2022-07-14 ENCOUNTER — OFFICE VISIT (OUTPATIENT)
Dept: PRIMARY CARE CLINIC | Age: 82
End: 2022-07-14
Payer: MEDICARE

## 2022-07-14 VITALS
HEART RATE: 73 BPM | HEIGHT: 60 IN | BODY MASS INDEX: 28.98 KG/M2 | RESPIRATION RATE: 18 BRPM | TEMPERATURE: 97.5 F | SYSTOLIC BLOOD PRESSURE: 132 MMHG | DIASTOLIC BLOOD PRESSURE: 63 MMHG | OXYGEN SATURATION: 98 % | WEIGHT: 147.6 LBS

## 2022-07-14 DIAGNOSIS — C34.81 CANCER OF OVERLAPPING SITES OF RIGHT LUNG (HCC): Primary | ICD-10-CM

## 2022-07-14 DIAGNOSIS — D64.9 ANEMIA, UNSPECIFIED TYPE: ICD-10-CM

## 2022-07-14 DIAGNOSIS — M19.90 ARTHRITIS: ICD-10-CM

## 2022-07-14 DIAGNOSIS — Z74.09 IMPAIRED MOBILITY AND ENDURANCE: ICD-10-CM

## 2022-07-14 DIAGNOSIS — I10 PRIMARY HYPERTENSION: ICD-10-CM

## 2022-07-14 DIAGNOSIS — R63.0 DECREASED APPETITE: ICD-10-CM

## 2022-07-14 DIAGNOSIS — F32.A DEPRESSION, UNSPECIFIED DEPRESSION TYPE: ICD-10-CM

## 2022-07-14 DIAGNOSIS — J44.9 CHRONIC OBSTRUCTIVE PULMONARY DISEASE, UNSPECIFIED COPD TYPE (HCC): ICD-10-CM

## 2022-07-14 PROCEDURE — 1090F PRES/ABSN URINE INCON ASSESS: CPT | Performed by: NURSE PRACTITIONER

## 2022-07-14 PROCEDURE — G8752 SYS BP LESS 140: HCPCS | Performed by: NURSE PRACTITIONER

## 2022-07-14 PROCEDURE — 99214 OFFICE O/P EST MOD 30 MIN: CPT | Performed by: NURSE PRACTITIONER

## 2022-07-14 PROCEDURE — 1123F ACP DISCUSS/DSCN MKR DOCD: CPT | Performed by: NURSE PRACTITIONER

## 2022-07-14 PROCEDURE — G8754 DIAS BP LESS 90: HCPCS | Performed by: NURSE PRACTITIONER

## 2022-07-14 PROCEDURE — 1101F PT FALLS ASSESS-DOCD LE1/YR: CPT | Performed by: NURSE PRACTITIONER

## 2022-07-14 PROCEDURE — G8427 DOCREV CUR MEDS BY ELIG CLIN: HCPCS | Performed by: NURSE PRACTITIONER

## 2022-07-14 PROCEDURE — G8400 PT W/DXA NO RESULTS DOC: HCPCS | Performed by: NURSE PRACTITIONER

## 2022-07-14 PROCEDURE — G8432 DEP SCR NOT DOC, RNG: HCPCS | Performed by: NURSE PRACTITIONER

## 2022-07-14 PROCEDURE — G8417 CALC BMI ABV UP PARAM F/U: HCPCS | Performed by: NURSE PRACTITIONER

## 2022-07-14 PROCEDURE — G8536 NO DOC ELDER MAL SCRN: HCPCS | Performed by: NURSE PRACTITIONER

## 2022-07-14 RX ORDER — TIOTROPIUM BROMIDE INHALATION SPRAY 3.12 UG/1
SPRAY, METERED RESPIRATORY (INHALATION)
COMMUNITY
Start: 2022-06-06

## 2022-07-14 RX ORDER — CEPHALEXIN 500 MG/1
CAPSULE ORAL
COMMUNITY
Start: 2022-06-30

## 2022-07-14 NOTE — PROGRESS NOTES
Chief Complaint   Patient presents with    Hypertension    GERD     Follow up     1. \"Have you been to the ER, urgent care clinic since your last visit? Hospitalized since your last visit? \" No    2. \"Have you seen or consulted any other health care providers outside of the [address] [place] [address] since your last visit? \" in chart      3. For patients aged 39-70: Has the patient had a colonoscopy / FIT/ Cologuard? NA - based on age      If the patient is female:    4. For patients aged 41-77: Has the patient had a mammogram within the past 2 years? NA - based on age or sex      11. For patients aged 21-65: Has the patient had a pap smear?  NA - based on age or sex
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
## 2022-07-24 ENCOUNTER — TELEPHONE (OUTPATIENT)
Dept: PRIMARY CARE CLINIC | Age: 82
End: 2022-07-24

## 2022-08-01 ENCOUNTER — HOSPITAL ENCOUNTER (OUTPATIENT)
Dept: PET IMAGING | Age: 82
Discharge: HOME | End: 2022-08-01
Attending: INTERNAL MEDICINE
Payer: MEDICARE

## 2022-08-01 DIAGNOSIS — C34.81 MALIGNANT NEOPLASM OF OVERLAPPING SITES OF RIGHT LUNG (HCC): ICD-10-CM

## 2022-08-01 PROCEDURE — A9552 F18 FDG: HCPCS

## 2022-08-01 RX ADMIN — FLUDEOXYGLUCOSE F-18 8.29 MILLICURIE: 200 INJECTION INTRAVENOUS at 14:34

## 2022-08-02 RX ORDER — FLUDEOXYGLUCOSE F-18 200 MCI/ML
8.29 INJECTION INTRAVENOUS ONCE
Status: COMPLETED | OUTPATIENT
Start: 2022-08-02 | End: 2022-08-01

## 2022-08-07 ENCOUNTER — HOSPITAL ENCOUNTER (EMERGENCY)
Age: 82
Discharge: HOME | End: 2022-08-07
Attending: EMERGENCY MEDICINE
Payer: MEDICARE

## 2022-08-07 VITALS
HEART RATE: 72 BPM | WEIGHT: 147 LBS | DIASTOLIC BLOOD PRESSURE: 66 MMHG | TEMPERATURE: 98.4 F | SYSTOLIC BLOOD PRESSURE: 143 MMHG | BODY MASS INDEX: 28.86 KG/M2 | HEIGHT: 60 IN | OXYGEN SATURATION: 99 % | RESPIRATION RATE: 17 BRPM

## 2022-08-07 DIAGNOSIS — R42 DIZZINESS: Primary | ICD-10-CM

## 2022-08-07 LAB
ANION GAP SERPL CALC-SCNC: 10 MMOL/L (ref 5–15)
APPEARANCE UR: CLEAR
BASOPHILS # BLD: 0.1 K/UL (ref 0–0.2)
BASOPHILS NFR BLD: 1 % (ref 0–2.5)
BILIRUB UR QL: NEGATIVE
BUN SERPL-MCNC: 18 MG/DL (ref 6–20)
BUN/CREAT SERPL: 18 (ref 12–20)
CA-I BLD-MCNC: 9.4 MG/DL (ref 8.5–10.1)
CHLORIDE SERPL-SCNC: 102 MMOL/L (ref 97–108)
CO2 SERPL-SCNC: 24 MMOL/L (ref 21–32)
COLOR UR: NORMAL
CREAT SERPL-MCNC: 1.02 MG/DL (ref 0.55–1.02)
EOSINOPHIL # BLD: 0.1 K/UL (ref 0–0.7)
EOSINOPHIL NFR BLD: 1 % (ref 0.9–2.9)
ERYTHROCYTE [DISTWIDTH] IN BLOOD BY AUTOMATED COUNT: 23.3 % (ref 11.5–14.5)
GLUCOSE SERPL-MCNC: 105 MG/DL (ref 65–100)
GLUCOSE UR STRIP.AUTO-MCNC: NEGATIVE MG/DL
HCT VFR BLD AUTO: 29.4 % (ref 36–46)
HGB BLD-MCNC: 9.5 G/DL (ref 13.5–17.5)
HGB UR QL STRIP: NEGATIVE
KETONES UR QL STRIP.AUTO: NEGATIVE MG/DL
LEUKOCYTE ESTERASE UR QL STRIP.AUTO: NEGATIVE
LYMPHOCYTES # BLD: 1.9 K/UL (ref 1–4.8)
LYMPHOCYTES NFR BLD: 31 % (ref 20.5–51.1)
MCH RBC QN AUTO: 28 PG (ref 31–34)
MCHC RBC AUTO-ENTMCNC: 32.3 G/DL (ref 31–36)
MCV RBC AUTO: 86.6 FL (ref 80–100)
MONOCYTES # BLD: 0.6 K/UL (ref 0.2–2.4)
MONOCYTES NFR BLD: 10 % (ref 1.7–9.3)
NEUTS SEG # BLD: 3.4 K/UL (ref 1.8–7.7)
NEUTS SEG NFR BLD: 57 % (ref 42–75)
NITRITE UR QL STRIP.AUTO: NEGATIVE
NRBC # BLD: 0 K/UL
NRBC BLD-RTO: 0 PER 100 WBC
PH UR STRIP: 7 [PH] (ref 5–8)
PLATELET # BLD AUTO: 222 K/UL (ref 150–400)
PMV BLD AUTO: 8.3 FL (ref 6.5–11.5)
POTASSIUM SERPL-SCNC: 3.9 MMOL/L (ref 3.5–5.1)
PROT UR STRIP-MCNC: NEGATIVE MG/DL
RBC # BLD AUTO: 3.39 M/UL (ref 4.5–5.9)
SODIUM SERPL-SCNC: 136 MMOL/L (ref 136–145)
SP GR UR REFRACTOMETRY: 1.01 (ref 1–1.03)
TROPONIN-HIGH SENSITIVITY: 13 NG/L (ref 0–51)
UROBILINOGEN UR QL STRIP.AUTO: 0.2 EU/DL (ref 0.2–1)
WBC # BLD AUTO: 5.9 K/UL (ref 4.4–11.3)

## 2022-08-07 PROCEDURE — 96374 THER/PROPH/DIAG INJ IV PUSH: CPT

## 2022-08-07 PROCEDURE — 85025 COMPLETE CBC W/AUTO DIFF WBC: CPT

## 2022-08-07 PROCEDURE — 99284 EMERGENCY DEPT VISIT MOD MDM: CPT

## 2022-08-07 PROCEDURE — 84484 ASSAY OF TROPONIN QUANT: CPT

## 2022-08-07 PROCEDURE — 74011250636 HC RX REV CODE- 250/636: Performed by: EMERGENCY MEDICINE

## 2022-08-07 PROCEDURE — 81003 URINALYSIS AUTO W/O SCOPE: CPT

## 2022-08-07 PROCEDURE — 80048 BASIC METABOLIC PNL TOTAL CA: CPT

## 2022-08-07 PROCEDURE — 93005 ELECTROCARDIOGRAM TRACING: CPT

## 2022-08-07 PROCEDURE — 36415 COLL VENOUS BLD VENIPUNCTURE: CPT

## 2022-08-07 RX ORDER — ONDANSETRON 2 MG/ML
4 INJECTION INTRAMUSCULAR; INTRAVENOUS
Status: COMPLETED | OUTPATIENT
Start: 2022-08-07 | End: 2022-08-07

## 2022-08-07 RX ORDER — SODIUM CHLORIDE 9 MG/ML
125 INJECTION, SOLUTION INTRAVENOUS ONCE
Status: COMPLETED | OUTPATIENT
Start: 2022-08-07 | End: 2022-08-07

## 2022-08-07 RX ADMIN — SODIUM CHLORIDE 250 ML: 9 INJECTION, SOLUTION INTRAVENOUS at 02:16

## 2022-08-07 RX ADMIN — ONDANSETRON 4 MG: 2 INJECTION INTRAMUSCULAR; INTRAVENOUS at 02:13

## 2022-08-07 RX ADMIN — SODIUM CHLORIDE 125 ML/HR: 9 INJECTION, SOLUTION INTRAVENOUS at 02:17

## 2022-08-07 NOTE — ED TRIAGE NOTES
Patient with lightheadedness tonight with vomiting once in the car on the way here. Patient has hx of vertigo but \" it doesn't feel like that\".
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
## 2022-08-08 DIAGNOSIS — I10 PRIMARY HYPERTENSION: ICD-10-CM

## 2022-08-08 LAB
ATRIAL RATE: 82 BPM
CALCULATED P AXIS, ECG09: 78 DEGREES
CALCULATED R AXIS, ECG10: 16 DEGREES
CALCULATED T AXIS, ECG11: 44 DEGREES
DIAGNOSIS, 93000: NORMAL
P-R INTERVAL, ECG05: 138 MS
Q-T INTERVAL, ECG07: 357 MS
QRS DURATION, ECG06: 78 MS
QTC CALCULATION (BEZET), ECG08: 415 MS
VENTRICULAR RATE, ECG03: 81 BPM

## 2022-08-08 NOTE — TELEPHONE ENCOUNTER
Requested Prescriptions     Pending Prescriptions Disp Refills    losartan (COZAAR) 25 mg tablet 90 Tablet 0     Sig: Take 1 Tablet by mouth in the morning. metoprolol succinate (TOPROL-XL) 25 mg XL tablet 90 Tablet 0     Sig: Take 0.5 Tablets by mouth in the morning.

## 2022-08-09 RX ORDER — LOSARTAN POTASSIUM 25 MG/1
25 TABLET ORAL DAILY
Qty: 90 TABLET | Refills: 0 | Status: SHIPPED | OUTPATIENT
Start: 2022-08-09 | End: 2022-09-11

## 2022-08-09 RX ORDER — METOPROLOL SUCCINATE 25 MG/1
12.5 TABLET, EXTENDED RELEASE ORAL DAILY
Qty: 90 TABLET | Refills: 0 | Status: SHIPPED | OUTPATIENT
Start: 2022-08-09

## 2022-09-10 DIAGNOSIS — I10 PRIMARY HYPERTENSION: ICD-10-CM

## 2022-09-10 DIAGNOSIS — R42 VERTIGO: ICD-10-CM

## 2022-09-11 RX ORDER — MECLIZINE HYDROCHLORIDE 25 MG/1
TABLET ORAL
Qty: 30 TABLET | Refills: 0 | Status: SHIPPED | OUTPATIENT
Start: 2022-09-11

## 2022-09-11 RX ORDER — LOSARTAN POTASSIUM 25 MG/1
25 TABLET ORAL DAILY
Qty: 90 TABLET | Refills: 0 | Status: SHIPPED | OUTPATIENT
Start: 2022-09-11

## 2022-10-20 ENCOUNTER — HOSPITAL ENCOUNTER (OUTPATIENT)
Dept: RADIATION THERAPY | Age: 82
Discharge: HOME | End: 2022-10-20
Payer: MEDICARE

## 2022-10-20 PROCEDURE — 77295 3-D RADIOTHERAPY PLAN: CPT

## 2022-10-20 PROCEDURE — 77290 THER RAD SIMULAJ FIELD CPLX: CPT

## 2022-10-20 PROCEDURE — 77334 RADIATION TREATMENT AID(S): CPT

## 2022-10-20 PROCEDURE — 77300 RADIATION THERAPY DOSE PLAN: CPT

## 2022-10-24 ENCOUNTER — HOSPITAL ENCOUNTER (OUTPATIENT)
Dept: RADIATION THERAPY | Age: 82
Discharge: HOME | End: 2022-10-24
Payer: MEDICARE

## 2022-10-24 PROCEDURE — 77417 THER RADIOLOGY PORT IMAGE(S): CPT

## 2022-10-24 PROCEDURE — 77412 RADIATION TX DELIVERY LVL 3: CPT

## 2022-10-25 ENCOUNTER — HOSPITAL ENCOUNTER (OUTPATIENT)
Dept: RADIATION THERAPY | Age: 82
Discharge: HOME | End: 2022-10-25
Payer: MEDICARE

## 2022-10-25 PROCEDURE — 77412 RADIATION TX DELIVERY LVL 3: CPT

## 2022-10-26 ENCOUNTER — HOSPITAL ENCOUNTER (OUTPATIENT)
Dept: RADIATION THERAPY | Age: 82
Discharge: HOME | End: 2022-10-26
Payer: MEDICARE

## 2022-10-26 PROCEDURE — 77412 RADIATION TX DELIVERY LVL 3: CPT

## 2022-10-27 ENCOUNTER — HOSPITAL ENCOUNTER (OUTPATIENT)
Dept: RADIATION THERAPY | Age: 82
Discharge: HOME | End: 2022-10-27
Payer: MEDICARE

## 2022-10-27 PROCEDURE — 77412 RADIATION TX DELIVERY LVL 3: CPT

## 2022-10-28 ENCOUNTER — HOSPITAL ENCOUNTER (OUTPATIENT)
Dept: RADIATION THERAPY | Age: 82
Discharge: HOME | End: 2022-10-28
Payer: MEDICARE

## 2022-10-28 PROCEDURE — 77336 RADIATION PHYSICS CONSULT: CPT

## 2022-10-28 PROCEDURE — 77412 RADIATION TX DELIVERY LVL 3: CPT

## 2022-10-31 ENCOUNTER — TELEPHONE (OUTPATIENT)
Dept: PRIMARY CARE CLINIC | Age: 82
End: 2022-10-31

## 2022-10-31 ENCOUNTER — HOSPITAL ENCOUNTER (OUTPATIENT)
Dept: RADIATION THERAPY | Age: 82
Discharge: HOME | End: 2022-10-31
Payer: MEDICARE

## 2022-10-31 PROCEDURE — 77412 RADIATION TX DELIVERY LVL 3: CPT

## 2022-10-31 PROCEDURE — 77417 THER RADIOLOGY PORT IMAGE(S): CPT

## 2022-10-31 NOTE — TELEPHONE ENCOUNTER
Patient's daughter, Inocencia Ansari, requesting home health, personal care aide, to come in the mornings to ensure her mother is taking her medications, eating her breakfast, taking care of personal needs   Concerned that her mother is having some memory issues. One week left for completion of radiation therapy for brain mets.

## 2022-11-01 ENCOUNTER — HOSPITAL ENCOUNTER (OUTPATIENT)
Dept: RADIATION THERAPY | Age: 82
Discharge: HOME | End: 2022-11-01
Payer: MEDICARE

## 2022-11-01 PROCEDURE — 77412 RADIATION TX DELIVERY LVL 3: CPT

## 2022-11-02 ENCOUNTER — HOSPITAL ENCOUNTER (OUTPATIENT)
Dept: RADIATION THERAPY | Age: 82
Discharge: HOME | End: 2022-11-02
Payer: MEDICARE

## 2022-11-02 PROCEDURE — 77412 RADIATION TX DELIVERY LVL 3: CPT

## 2022-11-03 ENCOUNTER — HOSPITAL ENCOUNTER (OUTPATIENT)
Dept: RADIATION THERAPY | Age: 82
Discharge: HOME | End: 2022-11-03
Payer: MEDICARE

## 2022-11-03 PROCEDURE — 77412 RADIATION TX DELIVERY LVL 3: CPT

## 2022-11-04 ENCOUNTER — HOSPITAL ENCOUNTER (OUTPATIENT)
Dept: RADIATION THERAPY | Age: 82
Discharge: HOME | End: 2022-11-04
Payer: MEDICARE

## 2022-11-04 PROCEDURE — 77336 RADIATION PHYSICS CONSULT: CPT

## 2022-11-04 PROCEDURE — 77412 RADIATION TX DELIVERY LVL 3: CPT

## 2022-11-07 ENCOUNTER — HOSPITAL ENCOUNTER (OUTPATIENT)
Dept: RADIATION THERAPY | Age: 82
Discharge: HOME | End: 2022-11-07
Payer: MEDICARE

## 2022-11-07 PROCEDURE — 77412 RADIATION TX DELIVERY LVL 3: CPT

## 2022-11-07 PROCEDURE — 77417 THER RADIOLOGY PORT IMAGE(S): CPT

## 2022-11-08 ENCOUNTER — TRANSCRIBE ORDER (OUTPATIENT)
Dept: SCHEDULING | Age: 82
End: 2022-11-08

## 2022-11-08 ENCOUNTER — HOSPITAL ENCOUNTER (OUTPATIENT)
Dept: RADIATION THERAPY | Age: 82
Discharge: HOME | End: 2022-11-08
Payer: MEDICARE

## 2022-11-08 DIAGNOSIS — C79.31 BRAIN METASTASES (HCC): Primary | ICD-10-CM

## 2022-11-08 PROCEDURE — 77412 RADIATION TX DELIVERY LVL 3: CPT

## 2022-11-09 ENCOUNTER — HOSPITAL ENCOUNTER (OUTPATIENT)
Dept: RADIATION THERAPY | Age: 82
Discharge: HOME | End: 2022-11-09
Payer: MEDICARE

## 2022-11-09 PROCEDURE — 77412 RADIATION TX DELIVERY LVL 3: CPT

## 2022-11-10 ENCOUNTER — HOSPITAL ENCOUNTER (OUTPATIENT)
Dept: RADIATION THERAPY | Age: 82
Discharge: HOME | End: 2022-11-10
Payer: MEDICARE

## 2022-11-10 PROCEDURE — 77412 RADIATION TX DELIVERY LVL 3: CPT

## 2022-11-10 PROCEDURE — 77336 RADIATION PHYSICS CONSULT: CPT

## 2022-12-05 ENCOUNTER — TRANSCRIBE ORDER (OUTPATIENT)
Dept: SCHEDULING | Age: 82
End: 2022-12-05

## 2022-12-05 DIAGNOSIS — C34.81 MALIGNANT NEOPLASM OF OVERLAPPING SITES OF RIGHT LUNG (HCC): Primary | ICD-10-CM

## 2022-12-23 ENCOUNTER — HOSPITAL ENCOUNTER (OUTPATIENT)
Dept: CT IMAGING | Age: 82
Discharge: HOME | End: 2022-12-23
Attending: INTERNAL MEDICINE
Payer: MEDICARE

## 2022-12-23 DIAGNOSIS — C34.81 MALIGNANT NEOPLASM OF OVERLAPPING SITES OF RIGHT LUNG (HCC): ICD-10-CM

## 2022-12-23 LAB — CREAT BLD-MCNC: 1.1 MG/DL (ref 0.6–1.3)

## 2022-12-23 PROCEDURE — 74177 CT ABD & PELVIS W/CONTRAST: CPT

## 2022-12-23 PROCEDURE — 74011000636 HC RX REV CODE- 636: Performed by: INTERNAL MEDICINE

## 2022-12-23 PROCEDURE — 82565 ASSAY OF CREATININE: CPT

## 2022-12-23 RX ADMIN — IOPAMIDOL 100 ML: 755 INJECTION, SOLUTION INTRAVENOUS at 09:59

## 2022-12-30 ENCOUNTER — HOSPITAL ENCOUNTER (OUTPATIENT)
Dept: RADIATION THERAPY | Age: 82
End: 2022-12-30

## 2022-12-30 NOTE — PROGRESS NOTES
RADIATION ONCOLOGY FOLLOW-UP NOTE     Interval history: Ms. 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 Skip Leal looks quite frail but is relatively asymptomatic. Her main complaint today is of a poor appetite. She did have an episode of nausea and vomiting this morning but denies any previous episodes. She denies any headaches or visual changes. She has had gamma knife radiosurgery in the past but it is unclear when she next follows up with Dr. Santiago Kelley, or when her next brain MRI is scheduled. She is following up with Dr. Shelley later today. Physical exam: Today's weight is unrecorded; she is wheelchair-bound. B/P: 152/89. Temp: 98.4. Pulse: 89 and regular. Respirations: 16. ECOG Performance Status is 2. Pain score is 0/10. There is no palpable cervical, supraclavicular, infraclavicular adenopathy present. Lungs are clear to auscultation and percussion. Heart has a regular rate and rhythm without a murmur. Abdomen is soft and nontender with bowel sounds present. The skin over the treatment field is in good repair. Alopecia is noted. 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 Plan: At this point, Ms. Matta Blend care is focused on managing her symptoms and treating clinical/radiographic disease without further complicating her already tenuous overall health. I spoke with Dr. Shelley regarding the possibility of an appetite stimulant such as Megace oral suspension or Marinol. He will discuss this with the patient and her daughter this afternoon, as well as the timing of her next MRI with Dr. Santiago Kelley. She will return here for follow-up in 3 months.     Armen Us MD    CC: MD Sanjuana Hernández MD, MD

## 2023-01-31 ENCOUNTER — APPOINTMENT (OUTPATIENT)
Dept: GENERAL RADIOLOGY | Age: 83
End: 2023-01-31
Attending: EMERGENCY MEDICINE
Payer: MEDICARE

## 2023-01-31 ENCOUNTER — HOSPITAL ENCOUNTER (EMERGENCY)
Age: 83
Discharge: HOME | End: 2023-01-31
Attending: EMERGENCY MEDICINE
Payer: MEDICARE

## 2023-01-31 VITALS
SYSTOLIC BLOOD PRESSURE: 129 MMHG | DIASTOLIC BLOOD PRESSURE: 63 MMHG | OXYGEN SATURATION: 97 % | HEART RATE: 86 BPM | RESPIRATION RATE: 17 BRPM | HEIGHT: 64 IN | WEIGHT: 139.3 LBS | TEMPERATURE: 99.8 F | BODY MASS INDEX: 23.78 KG/M2

## 2023-01-31 DIAGNOSIS — K59.00 CONSTIPATION, UNSPECIFIED CONSTIPATION TYPE: Primary | ICD-10-CM

## 2023-01-31 LAB
ALBUMIN SERPL-MCNC: 3.9 G/DL (ref 3.5–5)
ALBUMIN/GLOB SERPL: 1.1 (ref 1.1–2.2)
ALP SERPL-CCNC: 60 U/L (ref 45–117)
ALT SERPL-CCNC: 27 U/L (ref 12–78)
ANION GAP SERPL CALC-SCNC: 13 MMOL/L (ref 5–15)
APPEARANCE UR: CLEAR
AST SERPL W P-5'-P-CCNC: 16 U/L (ref 15–37)
BILIRUB SERPL-MCNC: 1.2 MG/DL (ref 0.2–1)
BILIRUB UR QL: NEGATIVE
BUN SERPL-MCNC: 23 MG/DL (ref 6–20)
BUN/CREAT SERPL: 19 (ref 12–20)
CA-I BLD-MCNC: 9.8 MG/DL (ref 8.5–10.1)
CHLORIDE SERPL-SCNC: 104 MMOL/L (ref 97–108)
CO2 SERPL-SCNC: 20 MMOL/L (ref 21–32)
COLOR UR: ABNORMAL
CREAT SERPL-MCNC: 1.2 MG/DL (ref 0.55–1.02)
ERYTHROCYTE [DISTWIDTH] IN BLOOD BY AUTOMATED COUNT: 17.1 % (ref 11.5–14.5)
GLOBULIN SER CALC-MCNC: 3.4 G/DL (ref 2–4)
GLUCOSE SERPL-MCNC: 86 MG/DL (ref 65–100)
GLUCOSE UR STRIP.AUTO-MCNC: NEGATIVE MG/DL
HCT VFR BLD AUTO: 29 % (ref 35–47)
HGB BLD-MCNC: 9.8 G/DL (ref 11.5–16)
HGB UR QL STRIP: NEGATIVE
INR PPP: 1.1 (ref 0.9–1.1)
KETONES UR QL STRIP.AUTO: 15 MG/DL
LACTATE SERPL-SCNC: 1.1 MMOL/L (ref 0.4–2)
LEUKOCYTE ESTERASE UR QL STRIP.AUTO: NEGATIVE
MAGNESIUM SERPL-MCNC: 1.8 MG/DL (ref 1.6–2.4)
MCH RBC QN AUTO: 28.6 PG (ref 26–34)
MCHC RBC AUTO-ENTMCNC: 33.8 G/DL (ref 30–36.5)
MCV RBC AUTO: 84.5 FL (ref 80–99)
NITRITE UR QL STRIP.AUTO: NEGATIVE
NRBC # BLD: 0 K/UL (ref 0–0.01)
NRBC BLD-RTO: 0 PER 100 WBC
PH UR STRIP: 5.5 (ref 5–8)
PLATELET # BLD AUTO: 215 K/UL (ref 150–400)
PMV BLD AUTO: 9.4 FL (ref 8.9–12.9)
POTASSIUM SERPL-SCNC: 4 MMOL/L (ref 3.5–5.1)
PROT SERPL-MCNC: 7.3 G/DL (ref 6.4–8.2)
PROT UR STRIP-MCNC: NEGATIVE MG/DL
PROTHROMBIN TIME: 14.2 SEC (ref 11.9–14.6)
RBC # BLD AUTO: 3.43 M/UL (ref 3.8–5.2)
SODIUM SERPL-SCNC: 137 MMOL/L (ref 136–145)
SP GR UR REFRACTOMETRY: >1.03 (ref 1–1.03)
TROPONIN-HIGH SENSITIVITY: 5 NG/L (ref 0–51)
UROBILINOGEN UR QL STRIP.AUTO: 0.2 EU/DL (ref 0.2–1)
WBC # BLD AUTO: 8 K/UL (ref 3.6–11)

## 2023-01-31 PROCEDURE — 96375 TX/PRO/DX INJ NEW DRUG ADDON: CPT

## 2023-01-31 PROCEDURE — 85027 COMPLETE CBC AUTOMATED: CPT

## 2023-01-31 PROCEDURE — 36415 COLL VENOUS BLD VENIPUNCTURE: CPT

## 2023-01-31 PROCEDURE — 80053 COMPREHEN METABOLIC PANEL: CPT

## 2023-01-31 PROCEDURE — 74011250637 HC RX REV CODE- 250/637: Performed by: EMERGENCY MEDICINE

## 2023-01-31 PROCEDURE — 74019 RADEX ABDOMEN 2 VIEWS: CPT

## 2023-01-31 PROCEDURE — 96374 THER/PROPH/DIAG INJ IV PUSH: CPT

## 2023-01-31 PROCEDURE — 85610 PROTHROMBIN TIME: CPT

## 2023-01-31 PROCEDURE — 83605 ASSAY OF LACTIC ACID: CPT

## 2023-01-31 PROCEDURE — 84484 ASSAY OF TROPONIN QUANT: CPT

## 2023-01-31 PROCEDURE — 51701 INSERT BLADDER CATHETER: CPT

## 2023-01-31 PROCEDURE — 81003 URINALYSIS AUTO W/O SCOPE: CPT

## 2023-01-31 PROCEDURE — 99284 EMERGENCY DEPT VISIT MOD MDM: CPT

## 2023-01-31 PROCEDURE — 83735 ASSAY OF MAGNESIUM: CPT

## 2023-01-31 PROCEDURE — 74011000250 HC RX REV CODE- 250: Performed by: EMERGENCY MEDICINE

## 2023-01-31 PROCEDURE — 87040 BLOOD CULTURE FOR BACTERIA: CPT

## 2023-01-31 PROCEDURE — 74011250636 HC RX REV CODE- 250/636: Performed by: EMERGENCY MEDICINE

## 2023-01-31 RX ORDER — ONDANSETRON 2 MG/ML
4 INJECTION INTRAMUSCULAR; INTRAVENOUS ONCE
Status: COMPLETED | OUTPATIENT
Start: 2023-01-31 | End: 2023-01-31

## 2023-01-31 RX ADMIN — FAMOTIDINE 20 MG: 10 INJECTION, SOLUTION INTRAVENOUS at 12:44

## 2023-01-31 RX ADMIN — ONDANSETRON 4 MG: 2 INJECTION INTRAMUSCULAR; INTRAVENOUS at 12:44

## 2023-01-31 RX ADMIN — SODIUM PHOSPHATE 1 ENEMA: 7; 19 ENEMA RECTAL at 13:35

## 2023-01-31 RX ADMIN — SODIUM CHLORIDE 1000 ML: 9 INJECTION, SOLUTION INTRAVENOUS at 12:44

## 2023-01-31 NOTE — ED NOTES
Fleet enema completed with assistance of Brett Rose, student nurse. Pt straight cathed with scant amount of urine.

## 2023-01-31 NOTE — ED NOTES
Pt and family given discharge and follow up instructions. No further questions at this time. Pt advised to follow and continue with appts.
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
## 2023-01-31 NOTE — ED TRIAGE NOTES
Pt has Lung Cancer- undergoing chemo - started with fever last night per EMS-Lips dry- appears dehydrated

## 2023-02-01 ENCOUNTER — HOSPITAL ENCOUNTER (EMERGENCY)
Age: 83
Discharge: ANOTHER HOSPITAL | End: 2023-02-01
Attending: EMERGENCY MEDICINE
Payer: MEDICARE

## 2023-02-01 ENCOUNTER — APPOINTMENT (OUTPATIENT)
Dept: CT IMAGING | Age: 83
End: 2023-02-01
Attending: EMERGENCY MEDICINE
Payer: MEDICARE

## 2023-02-01 ENCOUNTER — APPOINTMENT (OUTPATIENT)
Dept: GENERAL RADIOLOGY | Age: 83
End: 2023-02-01
Attending: EMERGENCY MEDICINE
Payer: MEDICARE

## 2023-02-01 VITALS
TEMPERATURE: 98.3 F | WEIGHT: 139 LBS | HEART RATE: 80 BPM | SYSTOLIC BLOOD PRESSURE: 122 MMHG | RESPIRATION RATE: 20 BRPM | OXYGEN SATURATION: 99 % | BODY MASS INDEX: 27.29 KG/M2 | DIASTOLIC BLOOD PRESSURE: 70 MMHG | HEIGHT: 60 IN

## 2023-02-01 DIAGNOSIS — G93.40 ENCEPHALOPATHY: Primary | ICD-10-CM

## 2023-02-01 LAB
ALBUMIN SERPL-MCNC: 3.6 G/DL (ref 3.5–5)
ALBUMIN/GLOB SERPL: 1.1 (ref 1.1–2.2)
ALP SERPL-CCNC: 51 U/L (ref 45–117)
ALT SERPL-CCNC: 29 U/L (ref 12–78)
ANION GAP SERPL CALC-SCNC: 13 MMOL/L (ref 5–15)
AST SERPL W P-5'-P-CCNC: 20 U/L (ref 15–37)
BASOPHILS # BLD: 0 K/UL (ref 0–0.1)
BASOPHILS NFR BLD: 0 % (ref 0–1)
BILIRUB SERPL-MCNC: 0.9 MG/DL (ref 0.2–1)
BUN SERPL-MCNC: 20 MG/DL (ref 6–20)
BUN/CREAT SERPL: 18 (ref 12–20)
CA-I BLD-MCNC: 9.2 MG/DL (ref 8.5–10.1)
CHLORIDE SERPL-SCNC: 106 MMOL/L (ref 97–108)
CO2 SERPL-SCNC: 19 MMOL/L (ref 21–32)
CREAT SERPL-MCNC: 1.12 MG/DL (ref 0.55–1.02)
DEPRECATED S PYO AG THROAT QL EIA: NEGATIVE
DIFFERENTIAL METHOD BLD: ABNORMAL
EOSINOPHIL # BLD: 0 K/UL (ref 0–0.4)
EOSINOPHIL NFR BLD: 0 % (ref 0–7)
ERYTHROCYTE [DISTWIDTH] IN BLOOD BY AUTOMATED COUNT: 17.5 % (ref 11.5–14.5)
FLUAV RNA SPEC QL NAA+PROBE: NOT DETECTED
FLUBV RNA SPEC QL NAA+PROBE: NOT DETECTED
GLOBULIN SER CALC-MCNC: 3.4 G/DL (ref 2–4)
GLUCOSE SERPL-MCNC: 106 MG/DL (ref 65–100)
HCT VFR BLD AUTO: 30.2 % (ref 35–47)
HGB BLD-MCNC: 9.9 G/DL (ref 11.5–16)
IMM GRANULOCYTES # BLD AUTO: 0 K/UL (ref 0–0.04)
IMM GRANULOCYTES NFR BLD AUTO: 0 % (ref 0–0.5)
LYMPHOCYTES # BLD: 0.9 K/UL (ref 0.8–3.5)
LYMPHOCYTES NFR BLD: 14 % (ref 12–49)
MCH RBC QN AUTO: 28.4 PG (ref 26–34)
MCHC RBC AUTO-ENTMCNC: 32.8 G/DL (ref 30–36.5)
MCV RBC AUTO: 86.5 FL (ref 80–99)
MONOCYTES # BLD: 0.6 K/UL (ref 0–1)
MONOCYTES NFR BLD: 9 % (ref 5–13)
NEUTS SEG # BLD: 5 K/UL (ref 1.8–8)
NEUTS SEG NFR BLD: 77 % (ref 32–75)
NRBC # BLD: 0 K/UL (ref 0–0.01)
NRBC BLD-RTO: 0 PER 100 WBC
PLATELET # BLD AUTO: 192 K/UL (ref 150–400)
PMV BLD AUTO: 9.9 FL (ref 8.9–12.9)
POTASSIUM SERPL-SCNC: 3.7 MMOL/L (ref 3.5–5.1)
PROT SERPL-MCNC: 7 G/DL (ref 6.4–8.2)
RBC # BLD AUTO: 3.49 M/UL (ref 3.8–5.2)
RSV AG NPH QL IA: NEGATIVE
SARS-COV-2 RNA RESP QL NAA+PROBE: DETECTED
SODIUM SERPL-SCNC: 138 MMOL/L (ref 136–145)
TROPONIN I SERPL HS-MCNC: 7 NG/L (ref 0–51)
WBC # BLD AUTO: 6.6 K/UL (ref 3.6–11)

## 2023-02-01 PROCEDURE — 87636 SARSCOV2 & INF A&B AMP PRB: CPT

## 2023-02-01 PROCEDURE — 74011250636 HC RX REV CODE- 250/636: Performed by: EMERGENCY MEDICINE

## 2023-02-01 PROCEDURE — 71045 X-RAY EXAM CHEST 1 VIEW: CPT

## 2023-02-01 PROCEDURE — 80053 COMPREHEN METABOLIC PANEL: CPT

## 2023-02-01 PROCEDURE — 87880 STREP A ASSAY W/OPTIC: CPT

## 2023-02-01 PROCEDURE — 74011250637 HC RX REV CODE- 250/637: Performed by: EMERGENCY MEDICINE

## 2023-02-01 PROCEDURE — 99285 EMERGENCY DEPT VISIT HI MDM: CPT

## 2023-02-01 PROCEDURE — 36415 COLL VENOUS BLD VENIPUNCTURE: CPT

## 2023-02-01 PROCEDURE — 84484 ASSAY OF TROPONIN QUANT: CPT

## 2023-02-01 PROCEDURE — 74011000250 HC RX REV CODE- 250: Performed by: EMERGENCY MEDICINE

## 2023-02-01 PROCEDURE — 70450 CT HEAD/BRAIN W/O DYE: CPT

## 2023-02-01 PROCEDURE — 96374 THER/PROPH/DIAG INJ IV PUSH: CPT

## 2023-02-01 PROCEDURE — 87807 RSV ASSAY W/OPTIC: CPT

## 2023-02-01 PROCEDURE — 85025 COMPLETE CBC W/AUTO DIFF WBC: CPT

## 2023-02-01 RX ORDER — ACETAMINOPHEN 500 MG
500 TABLET ORAL
Status: COMPLETED | OUTPATIENT
Start: 2023-02-01 | End: 2023-02-01

## 2023-02-01 RX ORDER — LIDOCAINE HYDROCHLORIDE 20 MG/ML
15 SOLUTION OROPHARYNGEAL
Status: COMPLETED | OUTPATIENT
Start: 2023-02-01 | End: 2023-02-01

## 2023-02-01 RX ORDER — MAG HYDROX/ALUMINUM HYD/SIMETH 200-200-20
30 SUSPENSION, ORAL (FINAL DOSE FORM) ORAL ONCE
Status: COMPLETED | OUTPATIENT
Start: 2023-02-01 | End: 2023-02-01

## 2023-02-01 RX ORDER — LEVOFLOXACIN 5 MG/ML
500 INJECTION, SOLUTION INTRAVENOUS
Status: COMPLETED | OUTPATIENT
Start: 2023-02-01 | End: 2023-02-01

## 2023-02-01 RX ADMIN — ACETAMINOPHEN 500 MG: 500 TABLET ORAL at 20:58

## 2023-02-01 RX ADMIN — LEVOFLOXACIN 500 MG: 5 INJECTION, SOLUTION INTRAVENOUS at 20:45

## 2023-02-01 RX ADMIN — LIDOCAINE HYDROCHLORIDE 15 ML: 20 SOLUTION ORAL; TOPICAL at 20:58

## 2023-02-01 RX ADMIN — ALUMINUM HYDROXIDE, MAGNESIUM HYDROXIDE, AND SIMETHICONE 30 ML: 200; 200; 20 SUSPENSION ORAL at 20:58

## 2023-02-01 NOTE — ED TRIAGE NOTES
Patient presetne with fatigue and severe pain in her throat when trying to swallow patient coughing phlegm as well. Patient diagnosed with UTI on Friday and was seen in the department yesterday for constipation and dehydration.  Patient  currently being treated with chemo for lung and brain CA once a month
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
## 2023-02-02 ENCOUNTER — APPOINTMENT (OUTPATIENT)
Dept: MRI IMAGING | Age: 83
End: 2023-02-02
Attending: STUDENT IN AN ORGANIZED HEALTH CARE EDUCATION/TRAINING PROGRAM
Payer: MEDICARE

## 2023-02-02 ENCOUNTER — HOSPITAL ENCOUNTER (INPATIENT)
Age: 83
LOS: 1 days | Discharge: HOME HEALTH | End: 2023-02-03
Attending: STUDENT IN AN ORGANIZED HEALTH CARE EDUCATION/TRAINING PROGRAM | Admitting: HOSPITALIST
Payer: MEDICARE

## 2023-02-02 DIAGNOSIS — R41.0 DELIRIUM: ICD-10-CM

## 2023-02-02 DIAGNOSIS — U07.1 COVID-19: Primary | ICD-10-CM

## 2023-02-02 PROBLEM — R41.82 ALTERED MENTAL STATUS: Status: ACTIVE | Noted: 2023-02-02

## 2023-02-02 LAB
ALBUMIN SERPL-MCNC: 3.3 G/DL (ref 3.5–5)
ALBUMIN/GLOB SERPL: 1 (ref 1.1–2.2)
ALP SERPL-CCNC: 47 U/L (ref 45–117)
ALT SERPL-CCNC: 27 U/L (ref 12–78)
ANION GAP SERPL CALC-SCNC: 8 MMOL/L (ref 5–15)
AST SERPL W P-5'-P-CCNC: 21 U/L (ref 15–37)
ATRIAL RATE: 74 BPM
BASOPHILS # BLD: 0 K/UL (ref 0–0.1)
BASOPHILS NFR BLD: 0 % (ref 0–1)
BILIRUB SERPL-MCNC: 0.7 MG/DL (ref 0.2–1)
BUN SERPL-MCNC: 22 MG/DL (ref 6–20)
BUN/CREAT SERPL: 24 (ref 12–20)
CA-I BLD-MCNC: 9.2 MG/DL (ref 8.5–10.1)
CALCULATED P AXIS, ECG09: 67 DEGREES
CALCULATED R AXIS, ECG10: 36 DEGREES
CALCULATED T AXIS, ECG11: 76 DEGREES
CHLORIDE SERPL-SCNC: 111 MMOL/L (ref 97–108)
CK SERPL-CCNC: 70 U/L (ref 26–192)
CO2 SERPL-SCNC: 19 MMOL/L (ref 21–32)
CREAT SERPL-MCNC: 0.93 MG/DL (ref 0.55–1.02)
DIAGNOSIS, 93000: NORMAL
DIFFERENTIAL METHOD BLD: ABNORMAL
EOSINOPHIL # BLD: 0 K/UL (ref 0–0.4)
EOSINOPHIL NFR BLD: 0 % (ref 0–7)
ERYTHROCYTE [DISTWIDTH] IN BLOOD BY AUTOMATED COUNT: 17 % (ref 11.5–14.5)
FIBRINOGEN PPP-MCNC: 667 MG/DL (ref 220–535)
GLOBULIN SER CALC-MCNC: 3.4 G/DL (ref 2–4)
GLUCOSE SERPL-MCNC: 98 MG/DL (ref 65–100)
HCT VFR BLD AUTO: 27.4 % (ref 35–47)
HGB BLD-MCNC: 9.3 G/DL (ref 11.5–16)
IMM GRANULOCYTES # BLD AUTO: 0 K/UL (ref 0–0.04)
IMM GRANULOCYTES NFR BLD AUTO: 1 % (ref 0–0.5)
INR PPP: 1.2 (ref 0.9–1.1)
LDH SERPL L TO P-CCNC: 203 U/L (ref 81–246)
LYMPHOCYTES # BLD: 1 K/UL (ref 0.8–3.5)
LYMPHOCYTES NFR BLD: 18 % (ref 12–49)
MCH RBC QN AUTO: 28.8 PG (ref 26–34)
MCHC RBC AUTO-ENTMCNC: 33.9 G/DL (ref 30–36.5)
MCV RBC AUTO: 84.8 FL (ref 80–99)
MONOCYTES # BLD: 0.5 K/UL (ref 0–1)
MONOCYTES NFR BLD: 9 % (ref 5–13)
NEUTS SEG # BLD: 4.1 K/UL (ref 1.8–8)
NEUTS SEG NFR BLD: 72 % (ref 32–75)
NRBC # BLD: 0 K/UL (ref 0–0.01)
NRBC BLD-RTO: 0 PER 100 WBC
P-R INTERVAL, ECG05: 118 MS
PLATELET # BLD AUTO: 200 K/UL (ref 150–400)
PMV BLD AUTO: 9.9 FL (ref 8.9–12.9)
POTASSIUM SERPL-SCNC: 3.8 MMOL/L (ref 3.5–5.1)
PROT SERPL-MCNC: 6.7 G/DL (ref 6.4–8.2)
PROTHROMBIN TIME: 15.5 SEC (ref 11.9–14.6)
Q-T INTERVAL, ECG07: 374 MS
QRS DURATION, ECG06: 74 MS
QTC CALCULATION (BEZET), ECG08: 415 MS
RBC # BLD AUTO: 3.23 M/UL (ref 3.8–5.2)
SODIUM SERPL-SCNC: 138 MMOL/L (ref 136–145)
TROPONIN I SERPL HS-MCNC: 6 NG/L (ref 0–51)
VENTRICULAR RATE, ECG03: 74 BPM
WBC # BLD AUTO: 5.7 K/UL (ref 3.6–11)

## 2023-02-02 PROCEDURE — 84484 ASSAY OF TROPONIN QUANT: CPT

## 2023-02-02 PROCEDURE — 85384 FIBRINOGEN ACTIVITY: CPT

## 2023-02-02 PROCEDURE — 74011250636 HC RX REV CODE- 250/636: Performed by: HOSPITALIST

## 2023-02-02 PROCEDURE — 85025 COMPLETE CBC W/AUTO DIFF WBC: CPT

## 2023-02-02 PROCEDURE — 65270000029 HC RM PRIVATE

## 2023-02-02 PROCEDURE — 97530 THERAPEUTIC ACTIVITIES: CPT

## 2023-02-02 PROCEDURE — 93005 ELECTROCARDIOGRAM TRACING: CPT

## 2023-02-02 PROCEDURE — 80053 COMPREHEN METABOLIC PANEL: CPT

## 2023-02-02 PROCEDURE — 94760 N-INVAS EAR/PLS OXIMETRY 1: CPT

## 2023-02-02 PROCEDURE — 83615 LACTATE (LD) (LDH) ENZYME: CPT

## 2023-02-02 PROCEDURE — 97165 OT EVAL LOW COMPLEX 30 MIN: CPT

## 2023-02-02 PROCEDURE — 97161 PT EVAL LOW COMPLEX 20 MIN: CPT

## 2023-02-02 PROCEDURE — 99285 EMERGENCY DEPT VISIT HI MDM: CPT

## 2023-02-02 PROCEDURE — 36415 COLL VENOUS BLD VENIPUNCTURE: CPT

## 2023-02-02 PROCEDURE — 85610 PROTHROMBIN TIME: CPT

## 2023-02-02 PROCEDURE — 74011000250 HC RX REV CODE- 250: Performed by: STUDENT IN AN ORGANIZED HEALTH CARE EDUCATION/TRAINING PROGRAM

## 2023-02-02 PROCEDURE — 74011250637 HC RX REV CODE- 250/637: Performed by: HOSPITALIST

## 2023-02-02 PROCEDURE — 82550 ASSAY OF CK (CPK): CPT

## 2023-02-02 PROCEDURE — 74011000250 HC RX REV CODE- 250: Performed by: INTERNAL MEDICINE

## 2023-02-02 PROCEDURE — 74011250636 HC RX REV CODE- 250/636: Performed by: STUDENT IN AN ORGANIZED HEALTH CARE EDUCATION/TRAINING PROGRAM

## 2023-02-02 RX ORDER — ONDANSETRON 4 MG/1
4 TABLET, ORALLY DISINTEGRATING ORAL
Status: DISCONTINUED | OUTPATIENT
Start: 2023-02-02 | End: 2023-02-03 | Stop reason: HOSPADM

## 2023-02-02 RX ORDER — LIDOCAINE HYDROCHLORIDE 20 MG/ML
15 SOLUTION OROPHARYNGEAL
Status: COMPLETED | OUTPATIENT
Start: 2023-02-02 | End: 2023-02-02

## 2023-02-02 RX ORDER — MEGESTROL ACETATE 125 MG/ML
625 SUSPENSION ORAL DAILY
COMMUNITY
Start: 2023-01-09 | End: 2023-02-16

## 2023-02-02 RX ORDER — SODIUM CHLORIDE 0.9 % (FLUSH) 0.9 %
5-40 SYRINGE (ML) INJECTION EVERY 8 HOURS
Status: DISCONTINUED | OUTPATIENT
Start: 2023-02-02 | End: 2023-02-03 | Stop reason: HOSPADM

## 2023-02-02 RX ORDER — ONDANSETRON 2 MG/ML
4 INJECTION INTRAMUSCULAR; INTRAVENOUS
Status: DISCONTINUED | OUTPATIENT
Start: 2023-02-02 | End: 2023-02-03 | Stop reason: HOSPADM

## 2023-02-02 RX ORDER — SODIUM CHLORIDE 0.9 % (FLUSH) 0.9 %
5-40 SYRINGE (ML) INJECTION AS NEEDED
Status: DISCONTINUED | OUTPATIENT
Start: 2023-02-02 | End: 2023-02-03 | Stop reason: HOSPADM

## 2023-02-02 RX ORDER — MEGESTROL ACETATE 40 MG/ML
800 SUSPENSION ORAL DAILY
Status: DISCONTINUED | OUTPATIENT
Start: 2023-02-02 | End: 2023-02-03 | Stop reason: HOSPADM

## 2023-02-02 RX ORDER — ACETAMINOPHEN 325 MG/1
650 TABLET ORAL
Status: DISCONTINUED | OUTPATIENT
Start: 2023-02-02 | End: 2023-02-03 | Stop reason: HOSPADM

## 2023-02-02 RX ORDER — METOPROLOL SUCCINATE 25 MG/1
12.5 TABLET, EXTENDED RELEASE ORAL DAILY
Status: DISCONTINUED | OUTPATIENT
Start: 2023-02-02 | End: 2023-02-03 | Stop reason: HOSPADM

## 2023-02-02 RX ORDER — LOSARTAN POTASSIUM 25 MG/1
25 TABLET ORAL DAILY
Status: DISCONTINUED | OUTPATIENT
Start: 2023-02-02 | End: 2023-02-03 | Stop reason: HOSPADM

## 2023-02-02 RX ORDER — ACETAMINOPHEN 650 MG/1
650 SUPPOSITORY RECTAL
Status: DISCONTINUED | OUTPATIENT
Start: 2023-02-02 | End: 2023-02-03 | Stop reason: HOSPADM

## 2023-02-02 RX ORDER — MEGESTROL ACETATE 40 MG/ML
200 SUSPENSION ORAL DAILY
Status: DISCONTINUED | OUTPATIENT
Start: 2023-02-02 | End: 2023-02-02 | Stop reason: SDUPTHER

## 2023-02-02 RX ORDER — DEXAMETHASONE 4 MG/1
6 TABLET ORAL DAILY
Status: DISCONTINUED | OUTPATIENT
Start: 2023-02-02 | End: 2023-02-02

## 2023-02-02 RX ORDER — ENOXAPARIN SODIUM 100 MG/ML
30 INJECTION SUBCUTANEOUS EVERY 12 HOURS
Status: DISCONTINUED | OUTPATIENT
Start: 2023-02-02 | End: 2023-02-03 | Stop reason: HOSPADM

## 2023-02-02 RX ORDER — ALBUTEROL SULFATE 90 UG/1
1 AEROSOL, METERED RESPIRATORY (INHALATION)
Status: DISCONTINUED | OUTPATIENT
Start: 2023-02-02 | End: 2023-02-03 | Stop reason: HOSPADM

## 2023-02-02 RX ADMIN — LOSARTAN POTASSIUM 25 MG: 25 TABLET, FILM COATED ORAL at 08:56

## 2023-02-02 RX ADMIN — NIRMATRELVIR AND RITONAVIR 3 TABLET: KIT at 10:35

## 2023-02-02 RX ADMIN — NIRMATRELVIR AND RITONAVIR 3 TABLET: KIT at 22:04

## 2023-02-02 RX ADMIN — LIDOCAINE HYDROCHLORIDE 15 ML: 20 SOLUTION ORAL; TOPICAL at 02:46

## 2023-02-02 RX ADMIN — SODIUM CHLORIDE 500 ML: 9 INJECTION, SOLUTION INTRAVENOUS at 01:19

## 2023-02-02 RX ADMIN — ENOXAPARIN SODIUM 30 MG: 100 INJECTION SUBCUTANEOUS at 03:10

## 2023-02-02 RX ADMIN — ENOXAPARIN SODIUM 30 MG: 100 INJECTION SUBCUTANEOUS at 15:01

## 2023-02-02 RX ADMIN — METOPROLOL SUCCINATE 12.5 MG: 25 TABLET, EXTENDED RELEASE ORAL at 08:55

## 2023-02-02 RX ADMIN — DEXAMETHASONE 6 MG: 4 TABLET ORAL at 03:10

## 2023-02-02 RX ADMIN — MEGESTROL ACETATE 800 MG: 400 SUSPENSION ORAL at 08:56
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
## 2023-02-02 NOTE — ED TRIAGE NOTES
Transfer of care from [place] [id number] with [facility] due to new AMS, hx of lung cancer, new diagnosis of encephalopathy; patient is covid pos; baseline is AAOx4 currently AAOx2

## 2023-02-02 NOTE — PROGRESS NOTES
Hospitalist Progress Note               Daily Progress Note: 2/2/2023      Subjective:   Hospital course to date:    Patient is an 80-year-old female with a history of stage IV lung cancer with brain metastases, status post gamma knife surgery, COPD, hypertension who presented to the ED with generalized weakness, lethargy and sore throat. In the ED she was mildly hypertensive. Oxygen saturations were 100% on room air. She was found to be COVID-positive. Chest x-ray was clear    Patient was admitted primarily for severe generalized weakness. She was started on IV Decadron by the admitting physician    --------  Patient is seen today for follow-up.       Problem List:  Problem List as of 2/2/2023 Date Reviewed: 2/2/2023            Codes Class Noted - Resolved    Altered mental status ICD-10-CM: R41.82  ICD-9-CM: 780.97  2/2/2023 - Present        COVID-19 ICD-10-CM: U07.1  ICD-9-CM: 079.89  2/2/2023 - Present        UTI (urinary tract infection) ICD-10-CM: N39.0  ICD-9-CM: 599.0  5/2/2022 - Present        Neutropenic fever ([facility] [address] ICD-10-CM: D70.9, R50.81  ICD-9-CM: 288.00, 780.61  4/28/2022 - Present        Febrile neutropenia ([facility] [address] ICD-10-CM: D70.9, R50.81  ICD-9-CM: 288.00, 780.61  4/28/2022 - Present        Cancer of overlapping sites of right lung [facility]) ICD-10-CM: C34.81  ICD-9-CM: 162.8  3/31/2022 - Present        Lung mass ICD-10-CM: R91.8  ICD-9-CM: 786.6  3/8/2022 - Present        At high risk for falls ICD-10-CM: Z91.81  ICD-9-CM: V15.88  2/7/2021 - Present        GERD (gastroesophageal reflux disease) ICD-10-CM: K21.9  ICD-9-CM: 530.81  8/28/2020 - Present        Hypertensive disorder ICD-10-CM: I10  ICD-9-CM: 401.9  8/28/2020 - Present           Medications reviewed  Current Facility-Administered Medications   Medication Dose Route Frequency    losartan (COZAAR) tablet 25 mg  25 mg Oral DAILY    metoprolol succinate (TOPROL-XL) XL tablet 12.5 mg  12.5 mg Oral DAILY    albuterol (PROVENTIL HFA, VENTOLIN HFA, PROAIR HFA) inhaler 1 Puff  1 Puff Inhalation Q4H PRN    tiotropium bromide (SPIRIVA RESPIMAT) 2.5 mcg /actuation  2 Puff Inhalation DAILY    megestroL (MEGACE) 400 mg/10 mL (10 mL) oral suspension 800 mg  800 mg Oral DAILY    sodium chloride (NS) flush 5-40 mL  5-40 mL IntraVENous Q8H    sodium chloride (NS) flush 5-40 mL  5-40 mL IntraVENous PRN    acetaminophen (TYLENOL) tablet 650 mg  650 mg Oral Q6H PRN    Or    acetaminophen (TYLENOL) suppository 650 mg  650 mg Rectal Q6H PRN    ondansetron (ZOFRAN ODT) tablet 4 mg  4 mg Oral Q6H PRN    Or    ondansetron (ZOFRAN) injection 4 mg  4 mg IntraVENous Q6H PRN    enoxaparin (LOVENOX) injection 30 mg  30 mg SubCUTAneous Q12H    dexAMETHasone (DECADRON) tablet 6 mg  6 mg Oral DAILY       Review of Systems:   A comprehensive review of systems was negative except for that written in the HPI. Objective:   Physical Exam:     Visit Vitals  /64 (BP 1 Location: Right upper arm, BP Patient Position: At rest;Supine;Lying)   Pulse 68   Temp 97.7 °F (36.5 °C)   Resp 16   Wt 62.4 kg (137 lb 9.1 oz)   SpO2 99%   Breastfeeding No   BMI 26.87 kg/m²           Temp (24hrs), Av.1 °F (36.7 °C), Min:97.7 °F (36.5 °C), Max:98.6 °F (37 °C)    No intake/output data recorded.  1901 -  0700  In: 500 [I.V.:500]  Out: -     General:   Awake and alert   Lungs:   Clear to auscultation bilaterally. Chest wall:  No tenderness or deformity. Heart:  Regular rate and rhythm, S1, S2 normal, no murmur, click, rub or gallop. Abdomen:   Soft, non-tender. Bowel sounds normal. No masses,  No organomegaly. Extremities: Extremities normal, atraumatic, no cyanosis or edema. Pulses: 2+ and symmetric all extremities. Skin: Skin color, texture, turgor normal. No rashes or lesions   Neurologic: CNII-XII intact.   No gross focal deficits         Data Review:       Recent Days:  Recent Labs     23  0103 23  1830 23  1130   WBC 5.7 6.6 8.0   HGB 9.3* 9. 9* 9.8*   HCT 27.4* 30.2* 29.0*    192 215     Recent Labs     02/02/23  0103 02/01/23  1830 01/31/23  1130    138 137   K 3.8 3.7 4.0   * 106 104   CO2 19* 19* 20*   GLU 98 106* 86   BUN 22* 20 23*   CREA 0.93 1.12* 1.20*   CA 9.2 9.2 9.8   MG  --   --  1.8   ALB 3.3* 3.6 3.9   TBILI 0.7 0.9 1.2*   ALT 27 29 27   INR  --   --  1.1     No results for input(s): PH, PCO2, PO2, HCO3, FIO2 in the last 72 hours. 24 Hour Results:  Recent Results (from the past 24 hour(s))   CBC WITH AUTOMATED DIFF    Collection Time: 02/01/23  6:30 PM   Result Value Ref Range    WBC 6.6 3.6 - 11.0 K/uL    RBC 3.49 (L) 3.80 - 5.20 M/uL    HGB 9.9 (L) 11.5 - 16.0 g/dL    HCT 30.2 (L) 35.0 - 47.0 %    MCV 86.5 80.0 - 99.0 FL    MCH 28.4 26.0 - 34.0 PG    MCHC 32.8 30.0 - 36.5 g/dL    RDW 17.5 (H) 11.5 - 14.5 %    PLATELET 090 661 - 507 K/uL    MPV 9.9 8.9 - 12.9 FL    NRBC 0.0 0.0  WBC    ABSOLUTE NRBC 0.00 0.00 - 0.01 K/uL    NEUTROPHILS 77 (H) 32 - 75 %    LYMPHOCYTES 14 12 - 49 %    MONOCYTES 9 5 - 13 %    EOSINOPHILS 0 0 - 7 %    BASOPHILS 0 0 - 1 %    IMMATURE GRANULOCYTES 0 0 - 0.5 %    ABS. NEUTROPHILS 5.0 1.8 - 8.0 K/UL    ABS. LYMPHOCYTES 0.9 0.8 - 3.5 K/UL    ABS. MONOCYTES 0.6 0.0 - 1.0 K/UL    ABS. EOSINOPHILS 0.0 0.0 - 0.4 K/UL    ABS. BASOPHILS 0.0 0.0 - 0.1 K/UL    ABS. IMM. GRANS. 0.0 0.00 - 0.04 K/UL    DF AUTOMATED     METABOLIC PANEL, COMPREHENSIVE    Collection Time: 02/01/23  6:30 PM   Result Value Ref Range    Sodium 138 136 - 145 mmol/L    Potassium 3.7 3.5 - 5.1 mmol/L    Chloride 106 97 - 108 mmol/L    CO2 19 (L) 21 - 32 mmol/L    Anion gap 13 5 - 15 mmol/L    Glucose 106 (H) 65 - 100 mg/dL    BUN 20 6 - 20 mg/dL    Creatinine 1.12 (H) 0.55 - 1.02 mg/dL    BUN/Creatinine ratio 18 12 - 20      eGFR 49 (L) >60 ml/min/1.73m2    Calcium 9.2 8.5 - 10.1 mg/dL    Bilirubin, total 0.9 0.2 - 1.0 mg/dL    AST (SGOT) 20 15 - 37 U/L    ALT (SGPT) 29 12 - 78 U/L    Alk.  phosphatase 51 45 - 117 U/L Protein, total 7.0 6.4 - 8.2 g/dL    Albumin 3.6 3.5 - 5.0 g/dL    Globulin 3.4 2.0 - 4.0 g/dL    A-G Ratio 1.1 1.1 - 2.2     TROPONIN-HIGH SENSITIVITY    Collection Time: 02/01/23  6:30 PM   Result Value Ref Range    Troponin-High Sensitivity 7 0 - 51 ng/L   COVID-19 WITH INFLUENZA A/B    Collection Time: 02/01/23  6:30 PM   Result Value Ref Range    SARS-CoV-2 by PCR DETECTED (A) Not Detected      Influenza A by PCR Not Detected Not Detected      Influenza B by PCR Not Detected Not Detected     RSV AG - RAPID    Collection Time: 02/01/23  6:30 PM   Result Value Ref Range    RSV Antigen Negative Negative     STREP AG SCREEN, GROUP A    Collection Time: 02/01/23  7:36 PM    Specimen: Serum; Throat   Result Value Ref Range    Group A Strep Ag ID Negative Negative     LD    Collection Time: 02/02/23  1:02 AM   Result Value Ref Range     81 - 246 U/L   CBC WITH AUTOMATED DIFF    Collection Time: 02/02/23  1:03 AM   Result Value Ref Range    WBC 5.7 3.6 - 11.0 K/uL    RBC 3.23 (L) 3.80 - 5.20 M/uL    HGB 9.3 (L) 11.5 - 16.0 g/dL    HCT 27.4 (L) 35.0 - 47.0 %    MCV 84.8 80.0 - 99.0 FL    MCH 28.8 26.0 - 34.0 PG    MCHC 33.9 30.0 - 36.5 g/dL    RDW 17.0 (H) 11.5 - 14.5 %    PLATELET 737 787 - 857 K/uL    MPV 9.9 8.9 - 12.9 FL    NRBC 0.0 0.0  WBC    ABSOLUTE NRBC 0.00 0.00 - 0.01 K/uL    NEUTROPHILS 72 32 - 75 %    LYMPHOCYTES 18 12 - 49 %    MONOCYTES 9 5 - 13 %    EOSINOPHILS 0 0 - 7 %    BASOPHILS 0 0 - 1 %    IMMATURE GRANULOCYTES 1 (H) 0 - 0.5 %    ABS. NEUTROPHILS 4.1 1.8 - 8.0 K/UL    ABS. LYMPHOCYTES 1.0 0.8 - 3.5 K/UL    ABS. MONOCYTES 0.5 0.0 - 1.0 K/UL    ABS. EOSINOPHILS 0.0 0.0 - 0.4 K/UL    ABS. BASOPHILS 0.0 0.0 - 0.1 K/UL    ABS. IMM.  GRANS. 0.0 0.00 - 0.04 K/UL    DF AUTOMATED     METABOLIC PANEL, COMPREHENSIVE    Collection Time: 02/02/23  1:03 AM   Result Value Ref Range    Sodium 138 136 - 145 mmol/L    Potassium 3.8 3.5 - 5.1 mmol/L    Chloride 111 (H) 97 - 108 mmol/L    CO2 19 (L) 21 - 32 mmol/L    Anion gap 8 5 - 15 mmol/L    Glucose 98 65 - 100 mg/dL    BUN 22 (H) 6 - 20 mg/dL    Creatinine 0.93 0.55 - 1.02 mg/dL    BUN/Creatinine ratio 24 (H) 12 - 20      eGFR >60 >60 ml/min/1.73m2    Calcium 9.2 8.5 - 10.1 mg/dL    Bilirubin, total 0.7 0.2 - 1.0 mg/dL    AST (SGOT) 21 15 - 37 U/L    ALT (SGPT) 27 12 - 78 U/L    Alk. phosphatase 47 45 - 117 U/L    Protein, total 6.7 6.4 - 8.2 g/dL    Albumin 3.3 (L) 3.5 - 5.0 g/dL    Globulin 3.4 2.0 - 4.0 g/dL    A-G Ratio 1.0 (L) 1.1 - 2.2     TROPONIN-HIGH SENSITIVITY    Collection Time: 02/02/23  1:03 AM   Result Value Ref Range    Troponin-High Sensitivity 6 0 - 51 ng/L   CK    Collection Time: 02/02/23  1:03 AM   Result Value Ref Range    CK 70 26 - 192 U/L       MRI BRAIN W CONT    (Results Pending)        Assessment:  COVID-19 without pneumonitis or hypoxia    Severe generalized weakness due to above    Hypotension due to hypovolemia/dehydration   -improved     Stage IV lung cancer with brain metastases and previous gamma knife    Essential hypertension   -BP meds on hold    Anemia of chronic disease      Discussion/MDM: Patient with multiple medical comorbidities, each with high likelihood for morbidity and mortality if left untreated. Patient being treated with medication that requires intensive monitoring due to increased risk for toxicity. I have reviewed patient's presenting subjective and objective findings, as well as all laboratory studies, imaging studies, and vital signs to date as well as treatment rendered and patient's response to those treatments. In addition, prior medical, surgical and relevant social and family histories were reviewed.         Plan:  Patient was started on Decadron by the admitting physician although technically does not qualify with for this in the absence of any pneumonitis or hypoxia    As she was admitted primarily for generalized weakness rather than COVID itself, she should be a candidate for Paxlovid    PT and OT consults. Patient may need inpatient rehabilitation    Discussed plan of care with her daughter at the bedside    Care Plan discussed with: Patient/Family    Code status: Full code    Social determinants of health: None known    Disposition: To be determined, possible discharge home with home health versus SNF pending PT evaluation and recommendations    Total time spent with patient: 30 minutes.     Ailin Harris MD
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
## 2023-02-02 NOTE — PROGRESS NOTES
Comprehensive Nutrition Assessment    Type and Reason for Visit: Initial, Positive nutrition screen (MST 3)    Nutrition Recommendations/Plan:   Continue diet. Boost VHC x2/d (1060 kcal, 44 gm PRO). Monitor and document PO and ONS intakes. Malnutrition Assessment:  Malnutrition Status:  Mild malnutrition (02/02/23 1229)    Context:  Chronic illness     Findings of the 6 clinical characteristics of malnutrition:   Energy Intake:  75% or less est energy requirements for 1 month or longer  Weight Loss:  No significant weight loss     Body Fat Loss:  Unable to assess,     Muscle Mass Loss:  Unable to assess,    Fluid Accumulation:  No significant fluid accumulation,     Strength:  Not performed     Nutrition Assessment:    Admitted today for AMS, COVID19+, difficulty swallowing. Screened for MST 3 d/t noted weight loss and poor intake PTA. Noted AKY=294# 6 months ago and 161# 1 year ago- indicative of clinically insignificant weight loss. RD suspects weight loss likely r/t hypermetabolic status w/ decreased intake 2/2 metastatic Lung CA on chemo and recent sx. Minimal intakes reported. Continue liberalized diet, modify ONS to high kcal/protein shake. Labs: Cl 111, BUN 22, H/H 9.3/27. 4. Meds: Megace, lopressor, losartan. Nutrition Related Findings:    NFPE deferred d/t on isolation precautions. No N/V/D/C nor chewing difficulties reported. No previous h/o dysphagia. Last BM PTA. No edema. Wound Type: None    Current Nutrition Intake & Therapies:  Average Meal Intake: 1-25%  Average Supplement Intake: None ordered  ADULT DIET Dysphagia - Minced & Moist  ADULT ORAL NUTRITION SUPPLEMENT Breakfast, Lunch, Dinner; Standard High Calorie/High Protein    Anthropometric Measures:  Height: 5' (152.4 cm)  Ideal Body Weight (IBW): 100 lbs (45 kg)  Admission Body Weight: 137 lb 9.1 oz  Current Body Wt:  62.4 kg (137 lb 9.1 oz) (2/2), 137.6 % IBW.  Bed scale  Current BMI (kg/m2): 26.9  Usual Body Weight: 73 kg (161 lb)  % Weight Change (Calculated): -14.6  Weight Adjustment: No adjustment  BMI Category: Overweight (BMI 25.0-29. 9)    Estimated Daily Nutrient Needs:  Energy Requirements Based On: Kcal/kg  Weight Used for Energy Requirements: Current  Energy (kcal/day): 9153-0304 kcal/d  (35-40 kcal/kg, Lung CA w/ mets on chemo)  Weight Used for Protein Requirements: Current  Protein (g/day):  gm/d  (1.5-2.2 gm/kg, Lung CA w/ mets on chemo)  Method Used for Fluid Requirements: 1 ml/kcal  Fluid (ml/day): 2184 mL/d    Nutrition Diagnosis:   Increased nutrient needs related to catabolic illness as evidenced by weight loss, poor intake prior to admission, intake 0-25% (Lung CA w/ mets to brain on chemo tx.)    Nutrition Interventions:   Food and/or Nutrient Delivery: Continue current diet, Start oral nutrition supplement  Nutrition Education/Counseling: No recommendations at this time  Coordination of Nutrition Care: Continue to monitor while inpatient, Speech therapy  Plan of Care discussed with: RN    Goals:  Goals: Meet at least 75% of estimated needs, PO intake 50% or greater, within 7 days, other (specify)  Specify Other Goals: Wt maintenance of +/- 0.5 kg x7 days. Nutrition Monitoring and Evaluation:   Behavioral-Environmental Outcomes: None identified  Food/Nutrient Intake Outcomes: Diet advancement/tolerance, Food and nutrient intake, Supplement intake  Physical Signs/Symptoms Outcomes: Biochemical data, Meal time behavior, Weight    Discharge Planning: Too soon to determine    Jarrett Keith RD  Contact: ext. [phone].

## 2023-02-02 NOTE — PROGRESS NOTES
Problem: Airway Clearance - Ineffective  Goal: Achieve or maintain patent airway  2/2/2023 1105 by Lorena Gama RN  Outcome: Progressing Towards Goal  2/2/2023 1104 by Lorena Gama RN  Outcome: Progressing Towards Goal     Problem: Gas Exchange - Impaired  Goal: Absence of hypoxia  2/2/2023 1105 by Lorena Gama RN  Outcome: Progressing Towards Goal  2/2/2023 1104 by Lorena Gama RN  Outcome: Progressing Towards Goal  Goal: Promote optimal lung function  Outcome: Progressing Towards Goal     Problem: Breathing Pattern - Ineffective  Goal: Ability to achieve and maintain a regular respiratory rate  2/2/2023 1105 by Lorena Gama RN  Outcome: Progressing Towards Goal  2/2/2023 1104 by Lorena Gama RN  Outcome: Progressing Towards Goal     Problem: Body Temperature -  Risk of, Imbalanced  Goal: Ability to maintain a body temperature within defined limits  Outcome: Resolved/Met  Goal: Will regain or maintain usual level of consciousness  Outcome: Resolved/Met     Problem: Nutrition Deficits  Goal: Optimize nutrtional status  2/2/2023 1105 by Lorena Gama RN  Outcome: Progressing Towards Goal  2/2/2023 1104 by Lorena Gama RN  Outcome: Progressing Towards Goal     Problem: Risk for Fluid Volume Deficit  Goal: Maintain normal heart rhythm  Outcome: Resolved/Met  Goal: Maintain absence of muscle cramping  Outcome: Resolved/Met     Problem: Fatigue  Goal: Verbalize increase energy and improved vitality  Outcome: Progressing Towards Goal     Problem: Falls - Risk of  Goal: *Absence of Falls  Description: Document Jason Fall Risk and appropriate interventions in the flowsheet.   2/2/2023 1105 by Lorena Gama RN  Outcome: Progressing Towards Goal  Note: Fall Risk Interventions:  Mobility Interventions: Bed/chair exit alarm, OT consult for ADLs, Strengthening exercises (ROM-active/passive)    Mentation Interventions: Bed/chair exit alarm, Increase mobility, Family/sitter at bedside         Elimination Interventions: Bed/chair exit alarm, Call light in reach           2/2/2023 1104 by Dilia Goel RN  Outcome: Progressing Towards Goal  Note: Fall Risk Interventions:  Mobility Interventions: Bed/chair exit alarm, OT consult for ADLs, Strengthening exercises (ROM-active/passive)    Mentation Interventions: Bed/chair exit alarm, Increase mobility, Family/sitter at bedside         Elimination Interventions: Bed/chair exit alarm, Call light in reach              Problem: Pressure Injury - Risk of  Goal: *Prevention of pressure injury  Description: Document Ravi Scale and appropriate interventions in the flowsheet. 2/2/2023 1105 by Dilia Goel RN  Outcome: Progressing Towards Goal  Note: Pressure Injury Interventions:       Moisture Interventions: Absorbent underpads, Apply protective barrier, creams and emollients    Activity Interventions: PT/OT evaluation, Increase time out of bed    Mobility Interventions: PT/OT evaluation, Turn and reposition approx. every two hours(pillow and wedges)    Nutrition Interventions: Document food/fluid/supplement intake    Friction and Shear Interventions: Apply protective barrier, creams and emollients, Minimize layers, Transferring/repositioning devices             2/2/2023 1104 by Dilia Goel RN  Outcome: Progressing Towards Goal  Note: Pressure Injury Interventions:       Moisture Interventions: Absorbent underpads, Apply protective barrier, creams and emollients    Activity Interventions: PT/OT evaluation, Increase time out of bed    Mobility Interventions: PT/OT evaluation, Turn and reposition approx.  every two hours(pillow and wedges)    Nutrition Interventions: Document food/fluid/supplement intake    Friction and Shear Interventions: Apply protective barrier, creams and emollients, Minimize layers, Transferring/repositioning devices

## 2023-02-02 NOTE — PROGRESS NOTES
Reason for Admission:  AMS, COVID-19               RUR Score: 22%        PCP: First and Last name:  Lakshmi Horn NP     Name of Practice:   Are you a current patient: Yes/No:   Approximate date of last visit: A few months ago   Can you do a virtual visit with your PCP:              Resources/supports as identified by patient/family:                   Top Challenges facing patient (as identified by patient/family and CM): Finances/Medication cost?  Insurance on file                Transportation? Family provides transportation         Support system or lack thereof? Son and daughter. Daughter visits daily. Living arrangements? Patient lives at home with son             Self-care/ADLs/Cognition? Some assistance with ADL's. Current Advanced Directive/Advance Care Plan:  Full Code    Healthcare Decision Maker:   Click here to complete HealthCare Decision Makers including selection of the Healthcare Decision Maker Relationship (ie \"Primary\")      Primary Decision MakeThiago Solis - [phone]    Payor Source Payor: Axel Ruvalcaba / Plan: [address] [place] [address] PPO/PFFS / Product Type: Managed Care Medicare /                           Plan for utilizing home health:  Current with [facility] and choice given to resume services. Referral sent. Transition of Care Plan:  CM met with patient and daughter at bedside to complete assessment. Address verified. DME: BSC, rollator, cane, wheelchair. Family will provide transportation upon DC.
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
## 2023-02-02 NOTE — ROUTINE PROCESS
TRANSFER - OUT REPORT:    Verbal report given to Elis(name) on Osiel Chung  being transferred to [facility] room 588(unit) for routine progression of care       Report consisted of patients Situation, Background, Assessment and   Recommendations(SBAR). Information from the following report(s) SBAR was reviewed with the receiving nurse. Lines:   Venous Access Device Portacath 03/31/22 Upper chest (subclavicular area, right (Active)        Opportunity for questions and clarification was provided.       Patient transported with:   [employer]
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
## 2023-02-03 VITALS
RESPIRATION RATE: 17 BRPM | OXYGEN SATURATION: 99 % | BODY MASS INDEX: 27.01 KG/M2 | TEMPERATURE: 98.3 F | WEIGHT: 137.57 LBS | DIASTOLIC BLOOD PRESSURE: 81 MMHG | SYSTOLIC BLOOD PRESSURE: 152 MMHG | HEIGHT: 60 IN | HEART RATE: 65 BPM

## 2023-02-03 LAB
25(OH)D3 SERPL-MCNC: 25.1 NG/ML (ref 30–100)
ALBUMIN SERPL-MCNC: 3.3 G/DL (ref 3.5–5)
ALBUMIN/GLOB SERPL: 0.9 (ref 1.1–2.2)
ALP SERPL-CCNC: 48 U/L (ref 45–117)
ALT SERPL-CCNC: 25 U/L (ref 12–78)
ANION GAP SERPL CALC-SCNC: 8 MMOL/L (ref 5–15)
AST SERPL W P-5'-P-CCNC: 17 U/L (ref 15–37)
BASOPHILS # BLD: 0 K/UL (ref 0–0.1)
BASOPHILS NFR BLD: 0 % (ref 0–1)
BILIRUB SERPL-MCNC: 0.5 MG/DL (ref 0.2–1)
BUN SERPL-MCNC: 22 MG/DL (ref 6–20)
BUN/CREAT SERPL: 23 (ref 12–20)
CA-I BLD-MCNC: 9.4 MG/DL (ref 8.5–10.1)
CHLORIDE SERPL-SCNC: 109 MMOL/L (ref 97–108)
CO2 SERPL-SCNC: 20 MMOL/L (ref 21–32)
CREAT SERPL-MCNC: 0.97 MG/DL (ref 0.55–1.02)
DIFFERENTIAL METHOD BLD: ABNORMAL
EOSINOPHIL # BLD: 0 K/UL (ref 0–0.4)
EOSINOPHIL NFR BLD: 0 % (ref 0–7)
ERYTHROCYTE [DISTWIDTH] IN BLOOD BY AUTOMATED COUNT: 16.7 % (ref 11.5–14.5)
GLOBULIN SER CALC-MCNC: 3.7 G/DL (ref 2–4)
GLUCOSE SERPL-MCNC: 112 MG/DL (ref 65–100)
HCT VFR BLD AUTO: 30.4 % (ref 35–47)
HGB BLD-MCNC: 10.2 G/DL (ref 11.5–16)
IMM GRANULOCYTES # BLD AUTO: 0 K/UL (ref 0–0.04)
IMM GRANULOCYTES NFR BLD AUTO: 0 % (ref 0–0.5)
LYMPHOCYTES # BLD: 1.2 K/UL (ref 0.8–3.5)
LYMPHOCYTES NFR BLD: 25 % (ref 12–49)
MCH RBC QN AUTO: 28.4 PG (ref 26–34)
MCHC RBC AUTO-ENTMCNC: 33.6 G/DL (ref 30–36.5)
MCV RBC AUTO: 84.7 FL (ref 80–99)
MONOCYTES # BLD: 0.3 K/UL (ref 0–1)
MONOCYTES NFR BLD: 5 % (ref 5–13)
NEUTS SEG # BLD: 3.4 K/UL (ref 1.8–8)
NEUTS SEG NFR BLD: 70 % (ref 32–75)
NRBC # BLD: 0 K/UL (ref 0–0.01)
NRBC BLD-RTO: 0 PER 100 WBC
PLATELET # BLD AUTO: 211 K/UL (ref 150–400)
PMV BLD AUTO: 9.8 FL (ref 8.9–12.9)
POTASSIUM SERPL-SCNC: 3.7 MMOL/L (ref 3.5–5.1)
PROT SERPL-MCNC: 7 G/DL (ref 6.4–8.2)
RBC # BLD AUTO: 3.59 M/UL (ref 3.8–5.2)
SODIUM SERPL-SCNC: 137 MMOL/L (ref 136–145)
WBC # BLD AUTO: 5 K/UL (ref 3.6–11)

## 2023-02-03 PROCEDURE — 74011250637 HC RX REV CODE- 250/637: Performed by: HOSPITALIST

## 2023-02-03 PROCEDURE — 36415 COLL VENOUS BLD VENIPUNCTURE: CPT

## 2023-02-03 PROCEDURE — 74011250636 HC RX REV CODE- 250/636: Performed by: HOSPITALIST

## 2023-02-03 PROCEDURE — 94640 AIRWAY INHALATION TREATMENT: CPT

## 2023-02-03 PROCEDURE — 74011000250 HC RX REV CODE- 250: Performed by: INTERNAL MEDICINE

## 2023-02-03 PROCEDURE — 82306 VITAMIN D 25 HYDROXY: CPT

## 2023-02-03 PROCEDURE — 80053 COMPREHEN METABOLIC PANEL: CPT

## 2023-02-03 PROCEDURE — 85025 COMPLETE CBC W/AUTO DIFF WBC: CPT

## 2023-02-03 RX ORDER — ONDANSETRON 4 MG/1
4 TABLET, ORALLY DISINTEGRATING ORAL
Qty: 15 TABLET | Refills: 0 | Status: SHIPPED | OUTPATIENT
Start: 2023-02-03 | End: 2023-02-16

## 2023-02-03 RX ADMIN — ENOXAPARIN SODIUM 30 MG: 100 INJECTION SUBCUTANEOUS at 02:33

## 2023-02-03 RX ADMIN — TIOTROPIUM BROMIDE INHALATION SPRAY 2 PUFF: 3.12 SPRAY, METERED RESPIRATORY (INHALATION) at 08:09

## 2023-02-03 RX ADMIN — LOSARTAN POTASSIUM 25 MG: 25 TABLET, FILM COATED ORAL at 08:27

## 2023-02-03 RX ADMIN — METOPROLOL SUCCINATE 12.5 MG: 25 TABLET, EXTENDED RELEASE ORAL at 08:26

## 2023-02-03 RX ADMIN — NIRMATRELVIR AND RITONAVIR 3 TABLET: KIT at 08:27

## 2023-02-03 RX ADMIN — MEGESTROL ACETATE 800 MG: 400 SUSPENSION ORAL at 08:27

## 2023-02-03 RX ADMIN — ENOXAPARIN SODIUM 30 MG: 100 INJECTION SUBCUTANEOUS at 14:39

## 2023-02-03 NOTE — PROGRESS NOTES
Problem: Airway Clearance - Ineffective  Goal: Achieve or maintain patent airway  Outcome: Progressing Towards Goal     Problem: Gas Exchange - Impaired  Goal: Absence of hypoxia  Outcome: Progressing Towards Goal  Goal: Promote optimal lung function  Outcome: Progressing Towards Goal     Problem: Breathing Pattern - Ineffective  Goal: Ability to achieve and maintain a regular respiratory rate  Outcome: Progressing Towards Goal     Problem: Body Temperature -  Risk of, Imbalanced  Goal: Complications related to the disease process, condition or treatment will be avoided or minimized  Outcome: Progressing Towards Goal     Problem: Isolation Precautions - Risk of Spread of Infection  Goal: Prevent transmission of infectious organism to others  Outcome: Progressing Towards Goal     Problem: Nutrition Deficits  Goal: Optimize nutrtional status  Outcome: Progressing Towards Goal     Problem: Risk for Fluid Volume Deficit  Goal: Maintain normal serum potassium, sodium, calcium, phosphorus, and pH  Outcome: Progressing Towards Goal     Problem: Fatigue  Goal: Verbalize increase energy and improved vitality  Outcome: Progressing Towards Goal     Problem: Patient Education: Go to Patient Education Activity  Goal: Patient/Family Education  Outcome: Progressing Towards Goal     Problem: Falls - Risk of  Goal: *Absence of Falls  Description: Document Jason Fall Risk and appropriate interventions in the flowsheet.   Outcome: Progressing Towards Goal  Note: Fall Risk Interventions:  Mobility Interventions: Bed/chair exit alarm, OT consult for ADLs, Strengthening exercises (ROM-active/passive)    Mentation Interventions: Bed/chair exit alarm, Increase mobility, Family/sitter at bedside         Elimination Interventions: Bed/chair exit alarm, Call light in reach              Problem: Patient Education: Go to Patient Education Activity  Goal: Patient/Family Education  Outcome: Progressing Towards Goal     Problem: Pressure Injury - Risk of  Goal: *Prevention of pressure injury  Description: Document Ravi Scale and appropriate interventions in the flowsheet.   Outcome: Progressing Towards Goal     Problem: Patient Education: Go to Patient Education Activity  Goal: Patient/Family Education  Outcome: Progressing Towards Goal     Problem: Patient Education: Go to Patient Education Activity  Goal: Patient/Family Education  Outcome: Progressing Towards Goal     Problem: Patient Education: Go to Patient Education Activity  Goal: Patient/Family Education  Outcome: Progressing Towards Goal

## 2023-02-03 NOTE — PROGRESS NOTES
Problem: Airway Clearance - Ineffective  Goal: Achieve or maintain patent airway  Outcome: Resolved/Met     Problem: Gas Exchange - Impaired  Goal: Absence of hypoxia  Outcome: Resolved/Met     Problem: Breathing Pattern - Ineffective  Goal: Ability to achieve and maintain a regular respiratory rate  Outcome: Resolved/Met     Problem: Body Temperature -  Risk of, Imbalanced  Goal: Complications related to the disease process, condition or treatment will be avoided or minimized  Outcome: Resolved/Met     Problem: Isolation Precautions - Risk of Spread of Infection  Goal: Prevent transmission of infectious organism to others  Outcome: Progressing Towards Goal     Problem: Nutrition Deficits  Goal: Optimize nutrtional status  Outcome: Progressing Towards Goal     Problem: Risk for Fluid Volume Deficit  Goal: Maintain normal serum potassium, sodium, calcium, phosphorus, and pH  Outcome: Resolved/Met     Problem: Fatigue  Goal: Verbalize increase energy and improved vitality  Outcome: Progressing Towards Goal     Problem: Pressure Injury - Risk of  Goal: *Prevention of pressure injury  Description: Document Ravi Scale and appropriate interventions in the flowsheet. Outcome: Progressing Towards Goal  Note: Pressure Injury Interventions:       Moisture Interventions: Apply protective barrier, creams and emollients, Absorbent underpads    Activity Interventions: PT/OT evaluation    Mobility Interventions: Float heels, PT/OT evaluation, Turn and reposition approx.  every two hours(pillow and wedges)    Nutrition Interventions: Document food/fluid/supplement intake    Friction and Shear Interventions: Apply protective barrier, creams and emollients, Minimize layers, Transferring/repositioning devices

## 2023-02-03 NOTE — DISCHARGE SUMMARY
Physician Discharge Summary     Patient ID:    Kait Villar  [id number]  80 y.o.  1940    Admit date: 2/2/2023    Discharge date : 2/3/2023    Final Diagnoses  COVID-19 without pneumonitis or hypoxia  Generalized weakness due to above  Hypotension due to dehydration  Stage IV lung cancer with brain metastases  Essential hypertension  Anemia of chronic disease        Reason for Hospitalization:  55-year-old female with a history of stage IV lung cancer with brain metastases, status post gamma knife surgery, COPD, hypertension who presented to the ED with generalized weakness, lethargy and sore throat. In the ED she was mildly hypertensive. Oxygen saturations were 100% on room air. She was found to be COVID-positive. Chest x-ray was clear      Hospital Course:   Patient was admitted to the medical floor and started on IV Decadron by the admitting physician. When I assumed her care I stopped this as she had no hypoxia or pneumonitis    Instead, she was started on Paxlovid    Patient was evaluated by PT and OT and was felt appropriate for home with home health    We will have her discontinue her antihypertensives given low blood pressure    She was felt stable for discharge home on 2/3              Discharge Medications:   Current Discharge Medication List        START taking these medications    Details   nirmatrelvir-ritonavir (PAXLOVID) 300 mg (150 mg x 2)-100 mg Salma Loraine as directed  Qty: 1 Box, Refills: 0  Start date: 2/3/2023      ondansetron (ZOFRAN ODT) 4 mg disintegrating tablet Take 1 Tablet by mouth every eight (8) hours as needed for Nausea or Vomiting. Qty: 15 Tablet, Refills: 0  Start date: 2/3/2023           CONTINUE these medications which have NOT CHANGED    Details   megestroL 625 mg/5 mL (125 mg/mL) suspension Take 625 mg by mouth daily.       Spiriva Respimat 2.5 mcg/actuation inhaler INHALE 2 SPRAY(S) BY MOUTH ONCE DAILY      ferrous sulfate 325 mg (65 mg iron) tablet Take 1 Tablet by mouth daily (with breakfast). Qty: 30 Tablet, Refills: 1      albuterol (PROVENTIL HFA, VENTOLIN HFA, PROAIR HFA) 90 mcg/actuation inhaler Take 1 Puff by inhalation every four (4) hours as needed for Wheezing. Qty: 18 g, Refills: 1    Associated Diagnoses: COVID-19; Persistent cough           STOP taking these medications       losartan (COZAAR) 25 mg tablet Comments:   Reason for Stopping:         metoprolol succinate (TOPROL-XL) 25 mg XL tablet Comments:   Reason for Stopping: Follow up Care:    1. Bartolo Schreiber NP in 1-2 weeks. Please call to set up an appointment shortly after discharge. Diet:  Regular Diet    Disposition:  Home. Advanced Directive:   FULL    DNR      Discharge Exam:  General:  Alert, cooperative, no distress, appears stated age. Lungs:   Clear to auscultation bilaterally. Chest wall:  No tenderness or deformity. Heart:  Regular rate and rhythm, S1, S2 normal, no murmur, click, rub or gallop. Abdomen:   Soft, non-tender. Bowel sounds normal. No masses,  No organomegaly. Extremities: Extremities normal, atraumatic, no cyanosis or edema. Pulses: 2+ and symmetric all extremities. Skin: Skin color, texture, turgor normal. No rashes or lesions   Neurologic: CNII-XII intact. No gross sensory or motor deficits        CONSULTATIONS: None    Significant Diagnostic Studies:   2/2/2023: BUN 22 mg/dL (H; Ref range: 6 - 20 mg/dL); Calcium 9.2 mg/dL (Ref range: 8.5 - 10.1 mg/dL); CO2 19 mmol/L (L; Ref range: 21 - 32 mmol/L); Creatinine 0.93 mg/dL (Ref range: 0.55 - 1.02 mg/dL); Glucose 98 mg/dL (Ref range: 65 - 100 mg/dL); HCT 27.4 % (L; Ref range: 35.0 - 47.0 %); HGB 9.3 g/dL (L; Ref range: 11.5 - 16.0 g/dL); Potassium 3.8 mmol/L (Ref range: 3.5 - 5.1 mmol/L); Sodium 138 mmol/L (Ref range: 136 - 145 mmol/L)  2/3/2023: BUN 22 mg/dL (H; Ref range: 6 - 20 mg/dL);  Calcium 9.4 mg/dL (Ref range: 8.5 - 10.1 mg/dL); CO2 20 mmol/L (L; Ref range: 21 - 32 mmol/L); Creatinine 0.97 mg/dL (Ref range: 0.55 - 1.02 mg/dL); Glucose 112 mg/dL (H; Ref range: 65 - 100 mg/dL); HCT 30.4 % (L; Ref range: 35.0 - 47.0 %); HGB 10.2 g/dL (L; Ref range: 11.5 - 16.0 g/dL); Potassium 3.7 mmol/L (Ref range: 3.5 - 5.1 mmol/L); Sodium 137 mmol/L (Ref range: 136 - 145 mmol/L)  Recent Labs     02/03/23  0910 02/02/23  0103   WBC 5.0 5.7   HGB 10.2* 9.3*   HCT 30.4* 27.4*    200     Recent Labs     02/03/23  0910 02/02/23 0103 02/01/23  1830    138 138   K 3.7 3.8 3.7   * 111* 106   CO2 20* 19* 19*   BUN 22* 22* 20   CREA 0.97 0.93 1.12*   * 98 106*   CA 9.4 9.2 9.2     Recent Labs     02/03/23  0910 02/02/23 0103 02/01/23  1830   ALT 25 27 29   AP 48 47 51   TBILI 0.5 0.7 0.9   TP 7.0 6.7 7.0   ALB 3.3* 3.3* 3.6   GLOB 3.7 3.4 3.4     Recent Labs     02/02/23  1121   INR 1.2*   PTP 15.5*      No results for input(s): FE, TIBC, PSAT, FERR in the last 72 hours. No results for input(s): PH, PCO2, PO2 in the last 72 hours.   Recent Labs     02/02/23  0103   CPK 70     No results found for: [facility]    Discharge time spent 35 minutes    Signed:  Leopold Fetters, MD  2/3/2023  12:40 PM

## 2023-02-03 NOTE — PROGRESS NOTES
CM reviewed chart. Patient current discharge plan is home with home health. Referral sent yesterday to [facility]. Has not accepted patient yet, waiting for acceptance on my end.    1500. Patient discharging home today with home health through [facility] home health. Discharge plan of care/case management plan validated with provider discharge order.

## 2023-02-03 NOTE — PROGRESS NOTES
Discharged with home health. PAXLOVID tablets given to patient's daughter. NO issues prior to discharge.

## 2023-02-05 LAB
BACTERIA SPEC CULT: NORMAL
SPECIAL REQUESTS,SREQ: NORMAL

## 2023-02-06 ENCOUNTER — TELEPHONE (OUTPATIENT)
Dept: PRIMARY CARE CLINIC | Age: 83
End: 2023-02-06

## 2023-02-06 ENCOUNTER — PATIENT OUTREACH (OUTPATIENT)
Dept: CASE MANAGEMENT | Age: 83
End: 2023-02-06

## 2023-02-06 NOTE — TELEPHONE ENCOUNTER
Care Transitions Initial Follow Up Call    Outreach made within 2 business days of discharge: Yes    Patient: Reno Lai Patient : 1940   MRN: [id number]  Reason for Admission: Altered mental status. Discharge Date: 2/3/23       Spoke with: Samantha Jones, daughter    Discharge department/facility: [facility]    TCM Interactive Patient Contact:  Was patient able to fill all prescriptions: Yes  Was patient instructed to bring all medications to the follow-up visit: Yes  Is patient taking all medications as directed in the discharge summary? Yes  Does patient understand their discharge instructions: Yes  Does patient have questions or concerns that need addressed prior to 7-14 day follow up office visit: Yes: Comment: Daughter asked about doing a VV, getting Hospice Care and a hospital bed. Scheduled appointment with PCP within 7-14 days.  Yes    Follow Up  Future Appointments   Date Time Provider Marianne Sharpe   2023  1:00 PM ROMEO Beebe   3/31/2023 11:00 AM EUNICE Abel LPN

## 2023-02-06 NOTE — PROGRESS NOTES
Care Transitions Initial Call    Call within 2 business days of discharge: Yes     Patient: Susu Martinez Patient : 1940 MRN: [id number]    Last Discharge [address]       Date Complaint Diagnosis Description Type Department Provider    23 Transfer Of Care COVID-19 . .. ED to Hosp-Admission (Discharged) (ADMIT) Faviola Puente MD; Candido Miguel... Was this an external facility discharge? No       Challenges to be reviewed by the provider   Additional needs identified to be addressed with provider:   Reilly Maynard requesting hospital bed for patient       Method of communication with provider : none    Discussed COVID-19 related testing which was available at this time. Test results were positive. Patient informed of results, if available? yes     Advance Care Planning:   Does patient have an Advance Directive:  will address on next outreach . Inpatient Readmission Risk score: Unplanned Readmit Risk Score: 22.5    Was this a readmission? no   Patient stated reason for the admission: covid    Patients top risk factors for readmission: level of motivation, medical condition-lung ca, and medication management   Interventions to address risk factors: Scheduled appointment with PCP- and Obtained and reviewed discharge summary and/or continuity of care documents    Care Transition Nurse (CTN) contacted the  daughterReilly  by telephone to perform post hospital discharge assessment. Verified name and  with family as identifiers. Provided introduction to self, and explanation of the CTN role. Denies chest pain, sob, fever. Daughter reports the patient is not eating. She gave the patient an Ensure Complete in which the patient took some sips. Ambulating with walker    CTN reviewed discharge instructions, medical action plan and red flags with family who verbalized understanding. Were discharge instructions available to patient? yes.  Reviewed appropriate site of care based on symptoms and resources available to patient including: PCP and Home Health. Family given an opportunity to ask questions and does not have any further questions or concerns at this time. The family agrees to contact the PCP office for questions related to their healthcare. Medication reconciliation was performed with family, who verbalizes understanding of administration of home medications. Referral to Pharm D needed: no     Home Health/Outpatient orders at discharge: home health care, PT, OT, and [address]: [facility]  Date of initial visit: 2/7/2023    Durable Medical Equipment ordered at discharge: None    Was patient discharged with a pulse oximeter? no    Discussed follow-up appointments. If no appointment was previously scheduled, appointment scheduling offered:  NA appt scheduled . Is follow up appointment scheduled within 7 days of discharge? yes. [facility] follow up appointment(s):   Future Appointments   Date Time Provider Marianne Sharpe   2/8/2023  4:15 PM Kisha Esparza NP SPCPE BS AMB   3/31/2023 11:00 AM [facility] RAD ONC THERAPY [facility]     Non-[facility] follow up appointment(s): NA    Plan for follow-up call in 5-7 days based on severity of symptoms and risk factors. Plan for next call: [facility]  CTN provided contact information for future needs. Goals Addressed                   This Visit's Progress     Prevent complications post hospitalization.         02/07/23  Absence of falls   Will complete abx therapy  Will attend VV with PCP scheduled 2/8  Appetite will improve  Will participate in [facility] PT to improve strength and mobility

## 2023-02-06 NOTE — TELEPHONE ENCOUNTER
While doing Transition of care call, daughter asked about getting patient a hospital bed. She stated she has stopped eating and asked about Hospice Care if eligible? She stated she does not know anything about Hospice but thinks it is time. And patient has an appointment to see you on 02/13/2023 at 1:00 pm and asked about it being a VV because it is so hard getting patient in and out.

## 2023-02-08 ENCOUNTER — APPOINTMENT (OUTPATIENT)
Dept: GENERAL RADIOLOGY | Age: 83
End: 2023-02-08
Attending: EMERGENCY MEDICINE
Payer: MEDICARE

## 2023-02-08 ENCOUNTER — VIRTUAL VISIT (OUTPATIENT)
Dept: PRIMARY CARE CLINIC | Age: 83
End: 2023-02-08
Payer: MEDICARE

## 2023-02-08 ENCOUNTER — APPOINTMENT (OUTPATIENT)
Dept: CT IMAGING | Age: 83
End: 2023-02-08
Attending: EMERGENCY MEDICINE
Payer: MEDICARE

## 2023-02-08 ENCOUNTER — HOSPITAL ENCOUNTER (INPATIENT)
Age: 83
LOS: 7 days | Discharge: NURSING, REHAB OR LONG-TERM CARE | End: 2023-02-16
Attending: EMERGENCY MEDICINE | Admitting: INTERNAL MEDICINE
Payer: MEDICARE

## 2023-02-08 DIAGNOSIS — R10.9 ABDOMINAL PAIN, UNSPECIFIED ABDOMINAL LOCATION: ICD-10-CM

## 2023-02-08 DIAGNOSIS — U07.1 COVID-19: ICD-10-CM

## 2023-02-08 DIAGNOSIS — D64.9 ANEMIA, UNSPECIFIED TYPE: ICD-10-CM

## 2023-02-08 DIAGNOSIS — N17.9 AKI (ACUTE KIDNEY INJURY) (HCC): ICD-10-CM

## 2023-02-08 DIAGNOSIS — Z74.09 IMPAIRED MOBILITY AND ENDURANCE: ICD-10-CM

## 2023-02-08 DIAGNOSIS — E86.0 DEHYDRATION: ICD-10-CM

## 2023-02-08 DIAGNOSIS — Z09 HOSPITAL DISCHARGE FOLLOW-UP: ICD-10-CM

## 2023-02-08 DIAGNOSIS — I10 PRIMARY HYPERTENSION: ICD-10-CM

## 2023-02-08 DIAGNOSIS — R63.0 DECREASED APPETITE: ICD-10-CM

## 2023-02-08 DIAGNOSIS — C34.90 PRIMARY MALIGNANT NEOPLASM OF LUNG METASTATIC TO OTHER SITE, UNSPECIFIED LATERALITY (HCC): Primary | ICD-10-CM

## 2023-02-08 DIAGNOSIS — R11.2 NAUSEA AND VOMITING, UNSPECIFIED VOMITING TYPE: Primary | ICD-10-CM

## 2023-02-08 DIAGNOSIS — J44.9 CHRONIC OBSTRUCTIVE PULMONARY DISEASE, UNSPECIFIED COPD TYPE (HCC): ICD-10-CM

## 2023-02-08 PROBLEM — D70.9 FEBRILE NEUTROPENIA (HCC): Status: RESOLVED | Noted: 2022-04-28 | Resolved: 2023-02-08

## 2023-02-08 PROBLEM — R50.81 FEBRILE NEUTROPENIA (HCC): Status: RESOLVED | Noted: 2022-04-28 | Resolved: 2023-02-08

## 2023-02-08 PROBLEM — R50.81 NEUTROPENIC FEVER (HCC): Status: RESOLVED | Noted: 2022-04-28 | Resolved: 2023-02-08

## 2023-02-08 PROBLEM — D70.9 NEUTROPENIC FEVER (HCC): Status: RESOLVED | Noted: 2022-04-28 | Resolved: 2023-02-08

## 2023-02-08 LAB
ALBUMIN SERPL-MCNC: 3.7 G/DL (ref 3.5–5)
ALBUMIN/GLOB SERPL: 1.1 (ref 1.1–2.2)
ALP SERPL-CCNC: 51 U/L (ref 45–117)
ALT SERPL-CCNC: 1161 U/L (ref 12–78)
ANION GAP SERPL CALC-SCNC: 10 MMOL/L (ref 5–15)
AST SERPL W P-5'-P-CCNC: 410 U/L (ref 15–37)
BASOPHILS # BLD: 0 K/UL (ref 0–0.1)
BASOPHILS NFR BLD: 0 % (ref 0–1)
BILIRUB SERPL-MCNC: 0.9 MG/DL (ref 0.2–1)
BUN SERPL-MCNC: 31 MG/DL (ref 6–20)
BUN/CREAT SERPL: 21 (ref 12–20)
CA-I BLD-MCNC: 9.8 MG/DL (ref 8.5–10.1)
CHLORIDE SERPL-SCNC: 103 MMOL/L (ref 97–108)
CO2 SERPL-SCNC: 25 MMOL/L (ref 21–32)
CREAT SERPL-MCNC: 1.47 MG/DL (ref 0.55–1.02)
DIFFERENTIAL METHOD BLD: ABNORMAL
EOSINOPHIL # BLD: 0.1 K/UL (ref 0–0.4)
EOSINOPHIL NFR BLD: 1 % (ref 0–7)
ERYTHROCYTE [DISTWIDTH] IN BLOOD BY AUTOMATED COUNT: 17 % (ref 11.5–14.5)
GLOBULIN SER CALC-MCNC: 3.5 G/DL (ref 2–4)
GLUCOSE SERPL-MCNC: 74 MG/DL (ref 65–100)
HCT VFR BLD AUTO: 33.2 % (ref 35–47)
HGB BLD-MCNC: 11.6 G/DL (ref 11.5–16)
LYMPHOCYTES # BLD: 2.3 K/UL (ref 0.8–3.5)
LYMPHOCYTES NFR BLD: 29 % (ref 12–49)
MCH RBC QN AUTO: 29.2 PG (ref 26–34)
MCHC RBC AUTO-ENTMCNC: 34.9 G/DL (ref 30–36.5)
MCV RBC AUTO: 83.7 FL (ref 80–99)
MONOCYTES # BLD: 0.6 K/UL (ref 0–1)
MONOCYTES NFR BLD: 7 % (ref 5–13)
NEUTS SEG # BLD: 5.1 K/UL (ref 1.8–8)
NEUTS SEG NFR BLD: 63 % (ref 32–75)
NRBC # BLD: 0.01 K/UL (ref 0–0.01)
NRBC BLD-RTO: 0.2 PER 100 WBC
PLATELET # BLD AUTO: 224 K/UL (ref 150–400)
PMV BLD AUTO: 7.7 FL (ref 8.9–12.9)
POTASSIUM SERPL-SCNC: 4.4 MMOL/L (ref 3.5–5.1)
PROT SERPL-MCNC: 7.2 G/DL (ref 6.4–8.2)
RBC # BLD AUTO: 3.97 M/UL (ref 3.8–5.2)
SODIUM SERPL-SCNC: 138 MMOL/L (ref 136–145)
WBC # BLD AUTO: 8 K/UL (ref 3.6–11)

## 2023-02-08 PROCEDURE — 85025 COMPLETE CBC W/AUTO DIFF WBC: CPT

## 2023-02-08 PROCEDURE — 74011250636 HC RX REV CODE- 250/636: Performed by: EMERGENCY MEDICINE

## 2023-02-08 PROCEDURE — 74177 CT ABD & PELVIS W/CONTRAST: CPT

## 2023-02-08 PROCEDURE — 71045 X-RAY EXAM CHEST 1 VIEW: CPT

## 2023-02-08 PROCEDURE — 96374 THER/PROPH/DIAG INJ IV PUSH: CPT

## 2023-02-08 PROCEDURE — 99285 EMERGENCY DEPT VISIT HI MDM: CPT

## 2023-02-08 PROCEDURE — 80053 COMPREHEN METABOLIC PANEL: CPT

## 2023-02-08 PROCEDURE — 96361 HYDRATE IV INFUSION ADD-ON: CPT

## 2023-02-08 PROCEDURE — 36415 COLL VENOUS BLD VENIPUNCTURE: CPT

## 2023-02-08 PROCEDURE — 74018 RADEX ABDOMEN 1 VIEW: CPT

## 2023-02-08 RX ORDER — ONDANSETRON 2 MG/ML
4 INJECTION INTRAMUSCULAR; INTRAVENOUS ONCE
Status: COMPLETED | OUTPATIENT
Start: 2023-02-08 | End: 2023-02-08

## 2023-02-08 RX ORDER — SODIUM CHLORIDE 9 MG/ML
125 INJECTION, SOLUTION INTRAVENOUS ONCE
Status: COMPLETED | OUTPATIENT
Start: 2023-02-08 | End: 2023-02-09

## 2023-02-08 RX ADMIN — ONDANSETRON 4 MG: 2 INJECTION INTRAMUSCULAR; INTRAVENOUS at 22:33

## 2023-02-08 RX ADMIN — SODIUM CHLORIDE 125 ML/HR: 9 INJECTION, SOLUTION INTRAVENOUS at 22:34

## 2023-02-08 NOTE — ED TRIAGE NOTES
Patient positive for covid but has had a decrease in appetite, urine output and is having fatigue hx of lung CA

## 2023-02-08 NOTE — Clinical Note
Patient Class[de-identified] OBSERVATION [104]   Type of Bed: Remote Telemetry [29]   Cardiac Monitoring Required?: No   Reason for Observation: acute   Admitting Diagnosis: Nausea & vomiting [489381]   Admitting Diagnosis: Acute kidney injury [facility] [[id number]]   Admitting Physician: Barrington Bowen   Attending Physician: Bradly Geller [[id number]]
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
## 2023-02-09 ENCOUNTER — APPOINTMENT (OUTPATIENT)
Dept: ULTRASOUND IMAGING | Age: 83
End: 2023-02-09
Attending: INTERNAL MEDICINE
Payer: MEDICARE

## 2023-02-09 PROBLEM — K59.00 CONSTIPATION: Status: ACTIVE | Noted: 2023-02-09

## 2023-02-09 PROBLEM — B17.9 ACUTE HEPATITIS: Status: ACTIVE | Noted: 2023-02-09

## 2023-02-09 PROBLEM — R11.2 NAUSEA & VOMITING: Status: ACTIVE | Noted: 2023-02-09

## 2023-02-09 PROBLEM — N17.9 ACUTE KIDNEY INJURY (HCC): Status: ACTIVE | Noted: 2023-02-09

## 2023-02-09 LAB
APAP SERPL-MCNC: <10 UG/ML (ref 10–30)
DATE LAST DOSE: ABNORMAL
LIPASE SERPL-CCNC: 85 U/L (ref 73–393)
REPORTED DOSE,DOSE: ABNORMAL UNITS

## 2023-02-09 PROCEDURE — 96372 THER/PROPH/DIAG INJ SC/IM: CPT

## 2023-02-09 PROCEDURE — 83690 ASSAY OF LIPASE: CPT

## 2023-02-09 PROCEDURE — XW0DXF5 INTRODUCTION OF OTHER NEW TECHNOLOGY THERAPEUTIC SUBSTANCE INTO MOUTH AND PHARYNX, EXTERNAL APPROACH, NEW TECHNOLOGY GROUP 5: ICD-10-PCS | Performed by: INTERNAL MEDICINE

## 2023-02-09 PROCEDURE — 74011250636 HC RX REV CODE- 250/636: Performed by: INTERNAL MEDICINE

## 2023-02-09 PROCEDURE — 74011000250 HC RX REV CODE- 250: Performed by: EMERGENCY MEDICINE

## 2023-02-09 PROCEDURE — 74011000636 HC RX REV CODE- 636: Performed by: EMERGENCY MEDICINE

## 2023-02-09 PROCEDURE — 80143 DRUG ASSAY ACETAMINOPHEN: CPT

## 2023-02-09 PROCEDURE — 97530 THERAPEUTIC ACTIVITIES: CPT

## 2023-02-09 PROCEDURE — G0378 HOSPITAL OBSERVATION PER HR: HCPCS

## 2023-02-09 PROCEDURE — 65270000029 HC RM PRIVATE

## 2023-02-09 PROCEDURE — 74011250637 HC RX REV CODE- 250/637: Performed by: INTERNAL MEDICINE

## 2023-02-09 PROCEDURE — 36415 COLL VENOUS BLD VENIPUNCTURE: CPT

## 2023-02-09 PROCEDURE — 76705 ECHO EXAM OF ABDOMEN: CPT

## 2023-02-09 PROCEDURE — 97161 PT EVAL LOW COMPLEX 20 MIN: CPT

## 2023-02-09 PROCEDURE — 74011250636 HC RX REV CODE- 250/636: Performed by: EMERGENCY MEDICINE

## 2023-02-09 RX ORDER — METOPROLOL SUCCINATE 25 MG/1
25 TABLET, EXTENDED RELEASE ORAL DAILY
COMMUNITY

## 2023-02-09 RX ORDER — SENNOSIDES 8.6 MG/1
1 TABLET ORAL DAILY
Status: DISCONTINUED | OUTPATIENT
Start: 2023-02-09 | End: 2023-02-09

## 2023-02-09 RX ORDER — ONDANSETRON 2 MG/ML
4 INJECTION INTRAMUSCULAR; INTRAVENOUS
Status: DISCONTINUED | OUTPATIENT
Start: 2023-02-09 | End: 2023-02-16 | Stop reason: HOSPADM

## 2023-02-09 RX ORDER — SODIUM CHLORIDE 0.9 % (FLUSH) 0.9 %
5-40 SYRINGE (ML) INJECTION AS NEEDED
Status: DISCONTINUED | OUTPATIENT
Start: 2023-02-09 | End: 2023-02-16 | Stop reason: HOSPADM

## 2023-02-09 RX ORDER — SODIUM CHLORIDE 0.9 % (FLUSH) 0.9 %
5-40 SYRINGE (ML) INJECTION EVERY 8 HOURS
Status: DISCONTINUED | OUTPATIENT
Start: 2023-02-09 | End: 2023-02-16 | Stop reason: HOSPADM

## 2023-02-09 RX ORDER — LOSARTAN POTASSIUM 25 MG/1
25 TABLET ORAL DAILY
COMMUNITY
End: 2023-02-16

## 2023-02-09 RX ORDER — SODIUM CHLORIDE 9 MG/ML
75 INJECTION, SOLUTION INTRAVENOUS CONTINUOUS
Status: DISPENSED | OUTPATIENT
Start: 2023-02-09 | End: 2023-02-10

## 2023-02-09 RX ORDER — ENOXAPARIN SODIUM 100 MG/ML
30 INJECTION SUBCUTANEOUS DAILY
Status: DISCONTINUED | OUTPATIENT
Start: 2023-02-09 | End: 2023-02-10

## 2023-02-09 RX ORDER — SODIUM CHLORIDE 9 MG/ML
75 INJECTION, SOLUTION INTRAVENOUS CONTINUOUS
Status: DISPENSED | OUTPATIENT
Start: 2023-02-09 | End: 2023-02-09

## 2023-02-09 RX ORDER — ACETAMINOPHEN 650 MG/1
650 SUPPOSITORY RECTAL
Status: DISCONTINUED | OUTPATIENT
Start: 2023-02-09 | End: 2023-02-09

## 2023-02-09 RX ORDER — DOCUSATE SODIUM 50 MG/5ML
100 LIQUID ORAL DAILY
Status: DISCONTINUED | OUTPATIENT
Start: 2023-02-09 | End: 2023-02-09

## 2023-02-09 RX ORDER — MEGESTROL ACETATE 125 MG/ML
625 SUSPENSION ORAL DAILY
Status: DISCONTINUED | OUTPATIENT
Start: 2023-02-10 | End: 2023-02-10

## 2023-02-09 RX ORDER — ONDANSETRON 4 MG/1
4 TABLET, ORALLY DISINTEGRATING ORAL
Status: DISCONTINUED | OUTPATIENT
Start: 2023-02-09 | End: 2023-02-16 | Stop reason: HOSPADM

## 2023-02-09 RX ORDER — ACETAMINOPHEN 325 MG/1
650 TABLET ORAL
Status: DISCONTINUED | OUTPATIENT
Start: 2023-02-09 | End: 2023-02-09

## 2023-02-09 RX ORDER — POLYETHYLENE GLYCOL 3350 17 G/17G
17 POWDER, FOR SOLUTION ORAL DAILY PRN
Status: DISCONTINUED | OUTPATIENT
Start: 2023-02-09 | End: 2023-02-16 | Stop reason: HOSPADM

## 2023-02-09 RX ORDER — DOCUSATE SODIUM 50 MG/5ML
100 LIQUID ORAL DAILY
Status: DISCONTINUED | OUTPATIENT
Start: 2023-02-09 | End: 2023-02-16 | Stop reason: HOSPADM

## 2023-02-09 RX ORDER — POLYETHYLENE GLYCOL 3350 17 G/17G
17 POWDER, FOR SOLUTION ORAL DAILY
Status: DISCONTINUED | OUTPATIENT
Start: 2023-02-09 | End: 2023-02-16 | Stop reason: HOSPADM

## 2023-02-09 RX ORDER — SENNOSIDES 8.6 MG/1
1 TABLET ORAL DAILY
Status: DISCONTINUED | OUTPATIENT
Start: 2023-02-09 | End: 2023-02-16 | Stop reason: HOSPADM

## 2023-02-09 RX ADMIN — IOPAMIDOL 100 ML: 755 INJECTION, SOLUTION INTRAVENOUS at 00:27

## 2023-02-09 RX ADMIN — SODIUM CHLORIDE 75 ML/HR: 9 INJECTION, SOLUTION INTRAVENOUS at 02:28

## 2023-02-09 RX ADMIN — Medication 10 ML: at 22:24

## 2023-02-09 RX ADMIN — SENNOSIDES 8.6 MG: 8.6 TABLET, FILM COATED ORAL at 02:26

## 2023-02-09 RX ADMIN — Medication 10 ML: at 06:09

## 2023-02-09 RX ADMIN — SODIUM CHLORIDE 75 ML/HR: 9 INJECTION, SOLUTION INTRAVENOUS at 14:17

## 2023-02-09 RX ADMIN — ENOXAPARIN SODIUM 30 MG: 100 INJECTION SUBCUTANEOUS at 09:10

## 2023-02-09 RX ADMIN — Medication 10 ML: at 14:12
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
## 2023-02-09 NOTE — ED NOTES
Pt port accessed per provider verbal orders. Port accessed using sterile technique, assisted by [employer], RN. Pt tolerated well.

## 2023-02-09 NOTE — PROGRESS NOTES
Remote Hospitalist ED sign out/admission acceptance    Chief complaint:  Abdominal pain, nausea, vomiting  On Paxilovid for COVID    Reason For admission:  Elevated liver enzymes  YVONNE  Constipation, rectal impaction  -recommend manual disimpaction and enema in the ER    Admission Status:Inpatient      ED Physician giving sign out: Dr. Victoria Reddy      Patient Vitals for the past 12 hrs:   Temp Pulse Resp BP SpO2   02/08/23 2356 98.9 °F (37.2 °C) 71 19 124/69 94 %   02/08/23 2356 -- -- -- -- 94 %   02/08/23 1845 98.9 °F (37.2 °C) 71 20 130/75 100 %         Recent Results (from the past 24 hour(s))   CBC WITH AUTOMATED DIFF    Collection Time: 02/08/23  9:22 PM   Result Value Ref Range    WBC 8.0 3.6 - 11.0 K/uL    RBC 3.97 3.80 - 5.20 M/uL    HGB 11.6 11.5 - 16.0 g/dL    HCT 33.2 (L) 35.0 - 47.0 %    MCV 83.7 80.0 - 99.0 FL    MCH 29.2 26.0 - 34.0 PG    MCHC 34.9 30.0 - 36.5 g/dL    RDW 17.0 (H) 11.5 - 14.5 %    PLATELET 565 607 - 348 K/uL    MPV 7.7 (L) 8.9 - 12.9 FL    NRBC 0.2 (H) 0.0  WBC    ABSOLUTE NRBC 0.01 0.00 - 0.01 K/uL    NEUTROPHILS 63 32 - 75 %    LYMPHOCYTES 29 12 - 49 %    MONOCYTES 7 5 - 13 %    EOSINOPHILS 1 0 - 7 %    BASOPHILS 0 0 - 1 %    ABS. NEUTROPHILS 5.1 1.8 - 8.0 K/UL    ABS. LYMPHOCYTES 2.3 0.8 - 3.5 K/UL    ABS. MONOCYTES 0.6 0.0 - 1.0 K/UL    ABS. EOSINOPHILS 0.1 0.0 - 0.4 K/UL    ABS.  BASOPHILS 0.0 0.0 - 0.1 K/UL    DF AUTOMATED     METABOLIC PANEL, COMPREHENSIVE    Collection Time: 02/08/23  9:22 PM   Result Value Ref Range    Sodium 138 136 - 145 mmol/L    Potassium 4.4 3.5 - 5.1 mmol/L    Chloride 103 97 - 108 mmol/L    CO2 25 21 - 32 mmol/L    Anion gap 10 5 - 15 mmol/L    Glucose 74 65 - 100 mg/dL    BUN 31 (H) 6 - 20 mg/dL    Creatinine 1.47 (H) 0.55 - 1.02 mg/dL    BUN/Creatinine ratio 21 (H) 12 - 20      eGFR 35 (L) >60 ml/min/1.73m2    Calcium 9.8 8.5 - 10.1 mg/dL    Bilirubin, total 0.9 0.2 - 1.0 mg/dL    AST (SGOT) 410 (H) 15 - 37 U/L    ALT (SGPT) 1,161 (H) 12 - 78 U/L Alk. phosphatase 51 45 - 117 U/L    Protein, total 7.2 6.4 - 8.2 g/dL    Albumin 3.7 3.5 - 5.0 g/dL    Globulin 3.5 2.0 - 4.0 g/dL    A-G Ratio 1.1 1.1 - 2.2           CT ABD PELV W CONT   Final Result      1. Unchanged 10 mm right lower lobe pulmonary nodule. 2. Small hiatal hernia. 3. Large amount of stool in the rectum. XR ABD (KUB)   Final Result   Moderate amount of stool in the distal colon and rectum. XR CHEST PORT   Final Result   No acute process              No results found for this or any previous visit.

## 2023-02-09 NOTE — ROUTINE PROCESS
TRANSFER - IN REPORT:    Verbal report received from christ(name) on [address]  being received from ed(unit) for routine progression of care      Report consisted of patients Situation, Background, Assessment and   Recommendations(SBAR). Information from the following report(s) SBAR was reviewed with the receiving nurse. Opportunity for questions and clarification was provided. Assessment completed upon patients arrival to unit and care assumed.
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
## 2023-02-09 NOTE — ED NOTES
Patient cleaned of incontinent stool at this time clean chux applied patient sat up in bed and meal tray provided patient assisted with food and drink but reports she doesn't want to eat it because of the smell
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
## 2023-02-10 LAB
ALBUMIN SERPL-MCNC: 2.8 G/DL (ref 3.5–5)
ALBUMIN/GLOB SERPL: 0.9 (ref 1.1–2.2)
ALP SERPL-CCNC: 41 U/L (ref 45–117)
ALT SERPL-CCNC: 1120 U/L (ref 12–78)
ANION GAP SERPL CALC-SCNC: 12 MMOL/L (ref 5–15)
AST SERPL W P-5'-P-CCNC: 454 U/L (ref 15–37)
BASOPHILS # BLD: 0 K/UL (ref 0–0.1)
BASOPHILS NFR BLD: 0 % (ref 0–1)
BILIRUB SERPL-MCNC: 0.7 MG/DL (ref 0.2–1)
BUN SERPL-MCNC: 17 MG/DL (ref 6–20)
BUN/CREAT SERPL: 22 (ref 12–20)
CA-I BLD-MCNC: 8.7 MG/DL (ref 8.5–10.1)
CHLORIDE SERPL-SCNC: 106 MMOL/L (ref 97–108)
CO2 SERPL-SCNC: 18 MMOL/L (ref 21–32)
CREAT SERPL-MCNC: 0.79 MG/DL (ref 0.55–1.02)
DIFFERENTIAL METHOD BLD: ABNORMAL
EOSINOPHIL # BLD: 0.1 K/UL (ref 0–0.4)
EOSINOPHIL NFR BLD: 1 % (ref 0–7)
ERYTHROCYTE [DISTWIDTH] IN BLOOD BY AUTOMATED COUNT: 17.2 % (ref 11.5–14.5)
GLOBULIN SER CALC-MCNC: 3.2 G/DL (ref 2–4)
GLUCOSE SERPL-MCNC: 64 MG/DL (ref 65–100)
HCT VFR BLD AUTO: 27.7 % (ref 35–47)
HGB BLD-MCNC: 9.3 G/DL (ref 11.5–16)
IMM GRANULOCYTES # BLD AUTO: 0.5 K/UL (ref 0–0.04)
IMM GRANULOCYTES NFR BLD AUTO: 7 % (ref 0–0.5)
LYMPHOCYTES # BLD: 1.7 K/UL (ref 0.8–3.5)
LYMPHOCYTES NFR BLD: 25 % (ref 12–49)
MCH RBC QN AUTO: 28.6 PG (ref 26–34)
MCHC RBC AUTO-ENTMCNC: 33.6 G/DL (ref 30–36.5)
MCV RBC AUTO: 85.2 FL (ref 80–99)
MONOCYTES # BLD: 0.5 K/UL (ref 0–1)
MONOCYTES NFR BLD: 7 % (ref 5–13)
NEUTS SEG # BLD: 3.8 K/UL (ref 1.8–8)
NEUTS SEG NFR BLD: 60 % (ref 32–75)
NRBC # BLD: 0 K/UL (ref 0–0.01)
NRBC BLD-RTO: 0 PER 100 WBC
PLATELET # BLD AUTO: 267 K/UL (ref 150–400)
PMV BLD AUTO: 9.7 FL (ref 8.9–12.9)
POTASSIUM SERPL-SCNC: 4.7 MMOL/L (ref 3.5–5.1)
PROT SERPL-MCNC: 6 G/DL (ref 6.4–8.2)
RBC # BLD AUTO: 3.25 M/UL (ref 3.8–5.2)
RBC MORPH BLD: ABNORMAL
SODIUM SERPL-SCNC: 136 MMOL/L (ref 136–145)
WBC # BLD AUTO: 6.6 K/UL (ref 3.6–11)

## 2023-02-10 PROCEDURE — 85025 COMPLETE CBC W/AUTO DIFF WBC: CPT

## 2023-02-10 PROCEDURE — 36415 COLL VENOUS BLD VENIPUNCTURE: CPT

## 2023-02-10 PROCEDURE — 97116 GAIT TRAINING THERAPY: CPT

## 2023-02-10 PROCEDURE — 74011250637 HC RX REV CODE- 250/637: Performed by: INTERNAL MEDICINE

## 2023-02-10 PROCEDURE — 97530 THERAPEUTIC ACTIVITIES: CPT

## 2023-02-10 PROCEDURE — 36592 COLLECT BLOOD FROM PICC: CPT

## 2023-02-10 PROCEDURE — 94640 AIRWAY INHALATION TREATMENT: CPT

## 2023-02-10 PROCEDURE — 80053 COMPREHEN METABOLIC PANEL: CPT

## 2023-02-10 PROCEDURE — 99222 1ST HOSP IP/OBS MODERATE 55: CPT | Performed by: INTERNAL MEDICINE

## 2023-02-10 PROCEDURE — 74011250636 HC RX REV CODE- 250/636: Performed by: EMERGENCY MEDICINE

## 2023-02-10 PROCEDURE — 65270000029 HC RM PRIVATE

## 2023-02-10 PROCEDURE — 74011000250 HC RX REV CODE- 250: Performed by: EMERGENCY MEDICINE

## 2023-02-10 RX ORDER — ENOXAPARIN SODIUM 100 MG/ML
40 INJECTION SUBCUTANEOUS DAILY
Status: DISCONTINUED | OUTPATIENT
Start: 2023-02-11 | End: 2023-02-16 | Stop reason: HOSPADM

## 2023-02-10 RX ADMIN — Medication 10 ML: at 22:28

## 2023-02-10 RX ADMIN — Medication 10 ML: at 16:42

## 2023-02-10 RX ADMIN — DOCUSATE SODIUM 100 MG: 50 LIQUID ORAL at 10:07

## 2023-02-10 RX ADMIN — POLYETHYLENE GLYCOL 3350 17 G: 17 POWDER, FOR SOLUTION ORAL at 10:07

## 2023-02-10 RX ADMIN — SENNOSIDES 8.6 MG: 8.6 TABLET, COATED ORAL at 10:07

## 2023-02-10 RX ADMIN — TIOTROPIUM BROMIDE INHALATION SPRAY 2 PUFF: 3.12 SPRAY, METERED RESPIRATORY (INHALATION) at 08:43

## 2023-02-10 RX ADMIN — ENOXAPARIN SODIUM 30 MG: 100 INJECTION SUBCUTANEOUS at 10:07

## 2023-02-10 RX ADMIN — Medication 10 ML: at 06:17

## 2023-02-10 NOTE — PROGRESS NOTES
Progress Note  Date:2/10/2023       Room:[facility]  Patient Name:Oanh Pulido     YOB: 1940     Age:82 y.o. Subjective    As per admitting provider on 2/9:  Peter Eaton is a 80 y.o. 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 Patient is alert and oriented x3 however poor historian. Majority of the ROS and HPI obtained from patient's daughter who is at bedside in the ED. Daughter states that over the last 6 days, since discharge from the hospital at [facility], patient has not been doing well with decreased oral intake, nausea and vomiting periodically along with abdominal discomfort. She denies any fever/chills, chest pain, diarrhea, any new rash. Patient in the ED with a significant CT abdomen/pelvis finding of severe large amount of stool in the rectum with severe constipation however chest x-ray otherwise unremarkable for any acute process. Patient also found to have acute renal failure with an increase in creatinine up to 1.47 along with BUN elevated at 31 in addition to severe acute hepatitis with AST/ALT of 410/1161. LFTs upon discharge from [facility] were completely normal and creatinine was 0.97. Daughter states that the patient has been compliant with her COVID-19 medication Paxlovid and has only 1 more day to finish this medication. In the ED patient is not found to be hypoxic and with normotensive blood pressures and is essentially afebrile without a cough or any respiratory distress. She did have multiple bowel movements in the ED with improvement in her abdominal pain and discomfort noted.      Patient is admitted to the hospital with acute renal failure, acute hepatitis of unknown etiology and with severe constipation. 2/10: Seen on follow-up resting in bed no acute distress liver functions continue to be elevated minimally decreased the patient was also still receiving her Paxlovid dosing which can cause acute hepatitis. GI evaluate the patient and also feels that medication induced on chart review no elevated liver functions history. .  Had additional bowel movement today    Review of Systems   Constitutional: Negative. HENT: Negative. Eyes: Negative. Respiratory: Negative. Cardiovascular: Negative. Gastrointestinal:  Positive for constipation. Endocrine: Negative. Genitourinary: Negative. Musculoskeletal: Negative. Skin: Negative. Allergic/Immunologic: Negative. Neurological:  Positive for weakness. Psychiatric/Behavioral: Negative. All other systems reviewed and are negative. Objective           Vitals Last 24 Hours:  Patient Vitals for the past 24 hrs:   Temp Pulse Resp BP SpO2   02/10/23 1236 97.6 °F (36.4 °C) 86 18 130/68 99 %   02/10/23 0843 -- -- -- -- 99 %   02/10/23 0820 97.3 °F (36.3 °C) 72 18 121/64 98 %   02/10/23 0352 98.2 °F (36.8 °C) 75 18 125/68 97 %   02/09/23 2341 98.6 °F (37 °C) 72 18 112/68 97 %   02/09/23 2018 98.2 °F (36.8 °C) 69 17 117/67 99 %        I/O (24Hr): Intake/Output Summary (Last 24 hours) at 2/10/2023 1545  Last data filed at 2/10/2023 0843  Gross per 24 hour   Intake 1352 ml   Output --   Net 1352 ml       Physical Exam:  General: Alert, cooperative, no distress, appears stated age. Head:  Normocephalic, without obvious abnormality, atraumatic. Eyes:  Conjunctivae/corneas clear. Pupils equal, round, reactive to light. Extraocular movements intact. Lungs: Diminished breath sounds bilaterally but no wheezes rales rhonchi  Chest wall: No tenderness or deformity. Heart:  Regular rate and rhythm, S1, S2 normal, no murmur, click, rub or gallop. Abdomen:  Soft, non-tender.  Bowel sounds normal. No masses,  No organomegaly. Extremities: Extremities normal, atraumatic, no cyanosis or edema. Pulses: 2+ and symmetric all extremities. Skin: Skin color, texture, turgor normal. No rashes or lesions  Neurologic: Awake, Alert, oriented. No obvious gross sensory or motor deficits      Medications           Current Facility-Administered Medications   Medication Dose Route Frequency    [START ON 2/11/2023] enoxaparin (LOVENOX) injection 40 mg  40 mg SubCUTAneous DAILY    polyethylene glycol (MIRALAX) packet 17 g  17 g Oral DAILY    sodium chloride (NS) flush 5-40 mL  5-40 mL IntraVENous Q8H    sodium chloride (NS) flush 5-40 mL  5-40 mL IntraVENous PRN    polyethylene glycol (MIRALAX) packet 17 g  17 g Oral DAILY PRN    ondansetron (ZOFRAN ODT) tablet 4 mg  4 mg Oral Q8H PRN    Or    ondansetron (ZOFRAN) injection 4 mg  4 mg IntraVENous Q6H PRN    docusate (COLACE) 50 mg/5 mL oral liquid 100 mg  100 mg Oral DAILY    senna (SENOKOT) tablet 8.6 mg  1 Tablet Oral DAILY    tiotropium bromide (SPIRIVA RESPIMAT) 2.5 mcg /actuation  2 Puff Inhalation DAILY         Allergies         Pcn [penicillins] and Tramadol       Labs/Imaging/Diagnostics      Labs:  Recent Results (from the past 48 hour(s))   CBC WITH AUTOMATED DIFF    Collection Time: 02/08/23  9:22 PM   Result Value Ref Range    WBC 8.0 3.6 - 11.0 K/uL    RBC 3.97 3.80 - 5.20 M/uL    HGB 11.6 11.5 - 16.0 g/dL    HCT 33.2 (L) 35.0 - 47.0 %    MCV 83.7 80.0 - 99.0 FL    MCH 29.2 26.0 - 34.0 PG    MCHC 34.9 30.0 - 36.5 g/dL    RDW 17.0 (H) 11.5 - 14.5 %    PLATELET 275 265 - 614 K/uL    MPV 7.7 (L) 8.9 - 12.9 FL    NRBC 0.2 (H) 0.0  WBC    ABSOLUTE NRBC 0.01 0.00 - 0.01 K/uL    NEUTROPHILS 63 32 - 75 %    LYMPHOCYTES 29 12 - 49 %    MONOCYTES 7 5 - 13 %    EOSINOPHILS 1 0 - 7 %    BASOPHILS 0 0 - 1 %    ABS. NEUTROPHILS 5.1 1.8 - 8.0 K/UL    ABS. LYMPHOCYTES 2.3 0.8 - 3.5 K/UL    ABS. MONOCYTES 0.6 0.0 - 1.0 K/UL    ABS. EOSINOPHILS 0.1 0.0 - 0.4 K/UL    ABS.  BASOPHILS 0.0 0.0 - 0.1 K/UL    DF AUTOMATED     METABOLIC PANEL, COMPREHENSIVE    Collection Time: 02/08/23  9:22 PM   Result Value Ref Range    Sodium 138 136 - 145 mmol/L    Potassium 4.4 3.5 - 5.1 mmol/L    Chloride 103 97 - 108 mmol/L    CO2 25 21 - 32 mmol/L    Anion gap 10 5 - 15 mmol/L    Glucose 74 65 - 100 mg/dL    BUN 31 (H) 6 - 20 mg/dL    Creatinine 1.47 (H) 0.55 - 1.02 mg/dL    BUN/Creatinine ratio 21 (H) 12 - 20      eGFR 35 (L) >60 ml/min/1.73m2    Calcium 9.8 8.5 - 10.1 mg/dL    Bilirubin, total 0.9 0.2 - 1.0 mg/dL    AST (SGOT) 410 (H) 15 - 37 U/L    ALT (SGPT) 1,161 (H) 12 - 78 U/L    Alk. phosphatase 51 45 - 117 U/L    Protein, total 7.2 6.4 - 8.2 g/dL    Albumin 3.7 3.5 - 5.0 g/dL    Globulin 3.5 2.0 - 4.0 g/dL    A-G Ratio 1.1 1.1 - 2.2     ACETAMINOPHEN    Collection Time: 02/09/23  2:25 PM   Result Value Ref Range    Acetaminophen level <10 (L) 10 - 30 ug/mL    Reported dose date Not provided      Reported dose: Not provided Units   LIPASE    Collection Time: 02/09/23  2:25 PM   Result Value Ref Range    Lipase 85 73 - 735 U/L   METABOLIC PANEL, COMPREHENSIVE    Collection Time: 02/10/23  5:25 AM   Result Value Ref Range    Sodium 136 136 - 145 mmol/L    Potassium 4.7 3.5 - 5.1 mmol/L    Chloride 106 97 - 108 mmol/L    CO2 18 (L) 21 - 32 mmol/L    Anion gap 12 5 - 15 mmol/L    Glucose 64 (L) 65 - 100 mg/dL    BUN 17 6 - 20 mg/dL    Creatinine 0.79 0.55 - 1.02 mg/dL    BUN/Creatinine ratio 22 (H) 12 - 20      eGFR >60 >60 ml/min/1.73m2    Calcium 8.7 8.5 - 10.1 mg/dL    Bilirubin, total 0.7 0.2 - 1.0 mg/dL    AST (SGOT) 454 (H) 15 - 37 U/L    ALT (SGPT) 1,120 (H) 12 - 78 U/L    Alk.  phosphatase 41 (L) 45 - 117 U/L    Protein, total 6.0 (L) 6.4 - 8.2 g/dL    Albumin 2.8 (L) 3.5 - 5.0 g/dL    Globulin 3.2 2.0 - 4.0 g/dL    A-G Ratio 0.9 (L) 1.1 - 2.2     CBC WITH AUTOMATED DIFF    Collection Time: 02/10/23  5:25 AM   Result Value Ref Range    WBC 6.6 3.6 - 11.0 K/uL    RBC 3.25 (L) 3.80 - 5.20 M/uL    HGB 9.3 (L) 11.5 - 16.0 g/dL    HCT 27.7 (L) 35.0 - 47.0 %    MCV 85.2 80.0 - 99.0 FL    MCH 28.6 26.0 - 34.0 PG    MCHC 33.6 30.0 - 36.5 g/dL    RDW 17.2 (H) 11.5 - 14.5 %    PLATELET 265 275 - 679 K/uL    MPV 9.7 8.9 - 12.9 FL    NRBC 0.0 0.0  WBC    ABSOLUTE NRBC 0.00 0.00 - 0.01 K/uL    NEUTROPHILS 60 32 - 75 %    LYMPHOCYTES 25 12 - 49 %    MONOCYTES 7 5 - 13 %    EOSINOPHILS 1 0 - 7 %    BASOPHILS 0 0 - 1 %    IMMATURE GRANULOCYTES 7 (H) 0 - 0.5 %    ABS. NEUTROPHILS 3.8 1.8 - 8.0 K/UL    ABS. LYMPHOCYTES 1.7 0.8 - 3.5 K/UL    ABS. MONOCYTES 0.5 0.0 - 1.0 K/UL    ABS. EOSINOPHILS 0.1 0.0 - 0.4 K/UL    ABS. BASOPHILS 0.0 0.0 - 0.1 K/UL    ABS. IMM. GRANS. 0.5 (H) 0.00 - 0.04 K/UL    DF AUTOMATED      RBC COMMENTS Normocytic, Normochromic          Imaging:  XR ABD (KUB)    Result Date: 2/8/2023  Moderate amount of stool in the distal colon and rectum. CT ABD PELV W CONT    Result Date: 2/9/2023  1. Unchanged 10 mm right lower lobe pulmonary nodule. 2. Small hiatal hernia. 3. Large amount of stool in the rectum. XR CHEST PORT    Result Date: 2/8/2023  No acute process    US RUQ    Result Date: 2/9/2023  1. Unremarkable appearance of the liver. 2.  Borderline thickening of the gallbladder wall is likely reactive to reported hepatitis.       Assessment//Plan           Problem List:  Hospital Problems  Date Reviewed: 2/2/2023            Codes Class Noted POA    Nausea & vomiting ICD-10-CM: R11.2  ICD-9-CM: 787.01  2/9/2023 Unknown        Acute kidney injury [facility]) ICD-10-CM: N17.9  ICD-9-CM: 584.9  2/9/2023 Unknown        * (Principal) Acute hepatitis ICD-10-CM: B17.9  ICD-9-CM: 570  2/9/2023 Unknown        Constipation ICD-10-CM: K59.00  ICD-9-CM: 564.00  2/9/2023 Unknown        Chronic obstructive pulmonary disease, unspecified COPD type ([facility] ICD-10-CM: J44.9  ICD-9-CM: 471  2/8/2023 Yes        Lung mass ICD-10-CM: R91.8  ICD-9-CM: 786.6  3/8/2022 Yes        GERD (gastroesophageal reflux disease) ICD-10-CM: K21.9  ICD-9-CM: 530.81  8/28/2020 Yes        Hypertensive disorder ICD-10-CM: I10  ICD-9-CM: 401.9  8/28/2020 Yes           Acute hepatitis  - Severely elevated ALT/AST with normal bilirubin noted. - Will obtain acetaminophen level, acute hepatitis panel along with right upper quadrant ultrasound.  -Most likely secondary to recent start of Paxlovid which does and can cause abdominal pain nausea vomiting and acute hepatitis or according to medication profile  -Also nausea vomiting its possibly secondary to the severe constipation  - Significant elevation in transaminases is known with Ritonavir. And received last dose yesterday  -GI consult appreciated  -Follow daily LFTs     Acute renal failure  - Likely secondary to side effects from medication for COVID-19 as well as poor oral intake secondary to nausea vomiting/constipation  -Given IV fluids overnight and repeat labs are in normal range so we will discontinue on 2/10     Stage IV metastatic Small cell lung cancer  - Status post right chest port for chemotherapy. - Follow-up with oncology on outpatient basis. -Delaying any further treatments until patient can get stronger post COVID     COPD  - Currently compensated. - Continue Spiriva and as needed inhalers. Failure to thrive  -Was placed on Megace but daughter states it really did not help so we will discontinue  -Nutrition consult appreciated    Generalized weakness  -Secondary to her underlying lung cancer as well as recent COVID and nausea vomiting diarrhea  -PT OT evaluation appreciated and recommend skilled rehab and awaiting placement     DVT prophylaxis  Lovenox sC. DNR/DNI  Discussed CODE STATUS extensively with patient's daughter and patient. Patient's daughter returned back to the nursing station later in the day and stated that the patient would be DNR and signed DNR paperwork.        Spent 35 minutes evaluting and coordinating patient care of which >50% was spent coordinating and counseling.           Electronically signed by Sohbha Victor MD on 2/10/2023 at 3:45 PM
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
## 2023-02-10 NOTE — PROGRESS NOTES
Problem: Pressure Injury - Risk of  Goal: *Prevention of pressure injury  Description: Document Ravi Scale and appropriate interventions in the flowsheet. Outcome: Progressing Towards Goal  Note: Pressure Injury Interventions:  Sensory Interventions: Assess changes in LOC, Check visual cues for pain, Keep linens dry and wrinkle-free, Maintain/enhance activity level, Minimize linen layers    Moisture Interventions: Absorbent underpads, Internal/External urinary devices, Limit adult briefs, Maintain skin hydration (lotion/cream), Minimize layers, Moisture barrier    Activity Interventions: PT/OT evaluation    Mobility Interventions: PT/OT evaluation    Nutrition Interventions: Document food/fluid/supplement intake, Offer support with meals,snacks and hydration    Friction and Shear Interventions: HOB 30 degrees or less, Minimize layers, Apply protective barrier, creams and emollients                Problem: Airway Clearance - Ineffective  Goal: Achieve or maintain patent airway  Outcome: Progressing Towards Goal     Problem: Gas Exchange - Impaired  Goal: Absence of hypoxia  Outcome: Progressing Towards Goal     Problem:  Body Temperature -  Risk of, Imbalanced  Goal: Will regain or maintain usual level of consciousness  Outcome: Progressing Towards Goal     Problem: Isolation Precautions - Risk of Spread of Infection  Goal: Prevent transmission of infectious organism to others  Outcome: Progressing Towards Goal     Problem: Nutrition Deficits  Goal: Optimize nutrtional status  Outcome: Progressing Towards Goal     Problem: Risk for Fluid Volume Deficit  Goal: Maintain normal heart rhythm  Outcome: Progressing Towards Goal     Problem: Loneliness or Risk for Loneliness  Goal: Demonstrate positive use of time alone when socialization is not possible  Outcome: Progressing Towards Goal

## 2023-02-10 NOTE — PROGRESS NOTES
PHYSICAL THERAPY TREATMENT  Patient: Delta Kan (83 y.o. female)  Date: 2/10/2023  Diagnosis: Nausea & vomiting [R11.2]  Acute kidney injury ([address] [N17.9]  Acute hepatitis [B17.9]  Constipation [K59.00] Acute hepatitis      Precautions:    Chart, physical therapy assessment, plan of care and goals were reviewed. ASSESSMENT  Patient continues with skilled PT services and is progressing towards goals. Pt was able to perform more ambulation today and needed less assistance with bed mobility. Current Level of Function Impacting Discharge (mobility/balance): pt requires assist for all activities of functional mobility    Other factors to consider for discharge: Pt needs more assist that what current caregivers can provide         PLAN :  Patient continues to benefit from skilled intervention to address the above impairments. Continue treatment per established plan of care. to address goals. Recommendation for discharge: (in order for the patient to meet his/her long term goals)  Therapy up to 5 days/week in SNF setting    This discharge recommendation:  Has been made in collaboration with the attending provider and/or case management    IF patient discharges home will need the following DME: rolling walker       SUBJECTIVE:   Patient stated I am okay I guess.     OBJECTIVE DATA SUMMARY:   Critical Behavior:  Neurologic State: Alert  Orientation Level: Oriented to person, Oriented to place, Oriented to time  Cognition: Follows commands     Functional Mobility Training:  Bed Mobility:  Rolling: Minimum assistance  Supine to Sit: Minimum assistance     Scooting: Minimum assistance        Transfers:  Sit to Stand: Minimum assistance  Stand to Sit: Contact guard assistance                             Balance:  Sitting: Intact; With support  Sitting - Static: Good (unsupported)  Sitting - Dynamic: Good (unsupported)  Standing: Impaired; With support  Standing - Static: Fair  Standing - Dynamic : Fair  Ambulation/Gait Training:  Distance (ft): 20 Feet (ft)  Assistive Device: Walker, rolling  Ambulation - Level of Assistance: Minimal assistance     Gait Description (WDL): Exceptions to WDL  Gait Abnormalities: Shuffling gait        Base of Support: Widened                       Treatment Session:   Pt needed tactile and verbal cues for safe transfer technique and sequencing and to safely perform AD management with ambulation    Pain Ratin/10; lower abdominal    Activity Tolerance:   Good    After treatment patient left in no apparent distress:   Call bell within reach, Caregiver / family present, and seated EOB    COMMUNICATION/COLLABORATION:   The patients plan of care was discussed with: Physical therapist, Physician, and Case management. Osmar Roque, PT, DPT   Time Calculation: 25 mins         Problem: Mobility Impaired (Adult and Pediatric)  Goal: *Acute Goals and Plan of Care (Insert Text)  Description: FUNCTIONAL STATUS PRIOR TO ADMISSION: Patient was modified independent using a walker for functional mobility. HOME SUPPORT PRIOR TO ADMISSION: The patient lived alone with children to provide assistance. Physical Therapy Goals  Initiated 2023  1. Patient will move from supine to sit and sit to supine  in bed with independence within 7 day(s). 2.  Patient will transfer from bed to chair and chair to bed with supervision/set-up using the least restrictive device within 7 day(s). 3.  Patient will perform sit to stand with modified independence within 7 day(s). 4.  Patient will ambulate with supervision/set-up for 25 feet with the least restrictive device within 7 day(s).          Outcome: Progressing Towards Goal

## 2023-02-10 NOTE — ROUTINE PROCESS
SBAR report received from SKYLER Langley RN on patient condition and current treatment plan. Per Kardex.

## 2023-02-11 LAB
ALBUMIN SERPL-MCNC: 3 G/DL (ref 3.5–5)
ALBUMIN/GLOB SERPL: 1 (ref 1.1–2.2)
ALP SERPL-CCNC: 49 U/L (ref 45–117)
ALT SERPL-CCNC: 1545 U/L (ref 12–78)
ANION GAP SERPL CALC-SCNC: 12 MMOL/L (ref 5–15)
AST SERPL W P-5'-P-CCNC: 665 U/L (ref 15–37)
BASOPHILS # BLD: 0.1 K/UL (ref 0–0.1)
BASOPHILS NFR BLD: 1 % (ref 0–1)
BILIRUB SERPL-MCNC: 0.6 MG/DL (ref 0.2–1)
BUN SERPL-MCNC: 15 MG/DL (ref 6–20)
BUN/CREAT SERPL: 15 (ref 12–20)
CA-I BLD-MCNC: 9.3 MG/DL (ref 8.5–10.1)
CHLORIDE SERPL-SCNC: 104 MMOL/L (ref 97–108)
CO2 SERPL-SCNC: 20 MMOL/L (ref 21–32)
CREAT SERPL-MCNC: 0.97 MG/DL (ref 0.55–1.02)
DIFFERENTIAL METHOD BLD: ABNORMAL
EOSINOPHIL # BLD: 0.1 K/UL (ref 0–0.4)
EOSINOPHIL NFR BLD: 1 % (ref 0–7)
ERYTHROCYTE [DISTWIDTH] IN BLOOD BY AUTOMATED COUNT: 17.5 % (ref 11.5–14.5)
GLOBULIN SER CALC-MCNC: 3.1 G/DL (ref 2–4)
GLUCOSE SERPL-MCNC: 86 MG/DL (ref 65–100)
HCT VFR BLD AUTO: 29.9 % (ref 35–47)
HGB BLD-MCNC: 10 G/DL (ref 11.5–16)
IMM GRANULOCYTES # BLD AUTO: 0.4 K/UL (ref 0–0.04)
IMM GRANULOCYTES NFR BLD AUTO: 6 % (ref 0–0.5)
LYMPHOCYTES # BLD: 1.8 K/UL (ref 0.8–3.5)
LYMPHOCYTES NFR BLD: 27 % (ref 12–49)
MCH RBC QN AUTO: 28.5 PG (ref 26–34)
MCHC RBC AUTO-ENTMCNC: 33.4 G/DL (ref 30–36.5)
MCV RBC AUTO: 85.2 FL (ref 80–99)
MONOCYTES # BLD: 0.4 K/UL (ref 0–1)
MONOCYTES NFR BLD: 6 % (ref 5–13)
NEUTS SEG # BLD: 3.7 K/UL (ref 1.8–8)
NEUTS SEG NFR BLD: 59 % (ref 32–75)
NRBC # BLD: 0 K/UL (ref 0–0.01)
NRBC BLD-RTO: 0 PER 100 WBC
PLATELET # BLD AUTO: 270 K/UL (ref 150–400)
PMV BLD AUTO: 10.3 FL (ref 8.9–12.9)
POTASSIUM SERPL-SCNC: 4.2 MMOL/L (ref 3.5–5.1)
PROT SERPL-MCNC: 6.1 G/DL (ref 6.4–8.2)
RBC # BLD AUTO: 3.51 M/UL (ref 3.8–5.2)
RBC MORPH BLD: ABNORMAL
SODIUM SERPL-SCNC: 136 MMOL/L (ref 136–145)
WBC # BLD AUTO: 6.5 K/UL (ref 3.6–11)

## 2023-02-11 PROCEDURE — 94640 AIRWAY INHALATION TREATMENT: CPT

## 2023-02-11 PROCEDURE — 65270000029 HC RM PRIVATE

## 2023-02-11 PROCEDURE — 36415 COLL VENOUS BLD VENIPUNCTURE: CPT

## 2023-02-11 PROCEDURE — 74011250636 HC RX REV CODE- 250/636: Performed by: HOSPITALIST

## 2023-02-11 PROCEDURE — 74011250637 HC RX REV CODE- 250/637: Performed by: INTERNAL MEDICINE

## 2023-02-11 PROCEDURE — 80053 COMPREHEN METABOLIC PANEL: CPT

## 2023-02-11 PROCEDURE — 74011000250 HC RX REV CODE- 250: Performed by: EMERGENCY MEDICINE

## 2023-02-11 PROCEDURE — 85025 COMPLETE CBC W/AUTO DIFF WBC: CPT

## 2023-02-11 RX ADMIN — DOCUSATE SODIUM 100 MG: 50 LIQUID ORAL at 08:33

## 2023-02-11 RX ADMIN — TIOTROPIUM BROMIDE INHALATION SPRAY 2 PUFF: 3.12 SPRAY, METERED RESPIRATORY (INHALATION) at 08:22

## 2023-02-11 RX ADMIN — Medication 10 ML: at 21:48

## 2023-02-11 RX ADMIN — POLYETHYLENE GLYCOL 3350 17 G: 17 POWDER, FOR SOLUTION ORAL at 08:33

## 2023-02-11 RX ADMIN — SENNOSIDES 8.6 MG: 8.6 TABLET, COATED ORAL at 08:33

## 2023-02-11 RX ADMIN — ENOXAPARIN SODIUM 40 MG: 100 INJECTION SUBCUTANEOUS at 08:33

## 2023-02-11 RX ADMIN — Medication 10 ML: at 13:47

## 2023-02-11 NOTE — ROUTINE PROCESS
The pt have had a restful night. She have had no n&v noted during the night. There have been no stools. The purewick remains in place & draining well. She have been repositioned to comfort in bed with pericare done. Will continue to monitor.

## 2023-02-11 NOTE — PROGRESS NOTES
HOSPITALIST PROGRESS NOTE  Popeye Arias MD, [address]         Daily Progress Note: 2023      Subjective:     Patient is alert and oriented x3. Daughter at bedside. No significant nausea/vomiting, abdominal pain, fever/chills or shortness of breath noted. Counseled on LFTs continue to rise and continue to monitor. Per family patient is awaiting placement per case management. Medications reviewed  Current Facility-Administered Medications   Medication Dose Route Frequency    enoxaparin (LOVENOX) injection 40 mg  40 mg SubCUTAneous DAILY    polyethylene glycol (MIRALAX) packet 17 g  17 g Oral DAILY    sodium chloride (NS) flush 5-40 mL  5-40 mL IntraVENous Q8H    sodium chloride (NS) flush 5-40 mL  5-40 mL IntraVENous PRN    polyethylene glycol (MIRALAX) packet 17 g  17 g Oral DAILY PRN    ondansetron (ZOFRAN ODT) tablet 4 mg  4 mg Oral Q8H PRN    Or    ondansetron (ZOFRAN) injection 4 mg  4 mg IntraVENous Q6H PRN    docusate (COLACE) 50 mg/5 mL oral liquid 100 mg  100 mg Oral DAILY    senna (SENOKOT) tablet 8.6 mg  1 Tablet Oral DAILY    tiotropium bromide (SPIRIVA RESPIMAT) 2.5 mcg /actuation  2 Puff Inhalation DAILY       Review of Systems:   A comprehensive review of systems was negative except for that written in the HPI.     Objective:   Physical Exam:     Visit Vitals  /67 (BP 1 Location: Left upper arm, BP Patient Position: At rest)   Pulse 67   Temp 97.6 °F (36.4 °C)   Resp 20   Ht 5' (1.524 m)   Wt 62.5 kg (137 lb 11.2 oz)   SpO2 98%   Breastfeeding No   BMI 26.89 kg/m²      O2 Device: None (Room air)  Patient Vitals for the past 8 hrs:   Temp Pulse Resp BP SpO2   23 1128 97.6 °F (36.4 °C) 67 20 119/67 98 %   23 0838 97.8 °F (36.6 °C) 66 20 126/65 100 %   23 0822 -- -- -- -- 99 %          Temp (24hrs), Av.6 °F (36.4 °C), Min:97.5 °F (36.4 °C), Max:97.8 °F (36.6 °C)    02/11 0701 - 02/11 1900  In: 250 [P.O.:240]  Out: 1    02/09 1901 - 02/11 0700  In: 7962 [P.O.:120; I.V.:1162]  Out: 350 [Urine:350]    General:  Alert, cooperative, no distress, appears stated age. Lungs:   Clear to auscultation bilaterally. Chest wall:  No tenderness or deformity. Heart:  Regular rate and rhythm, S1, S2 normal, no murmur, click, rub or gallop. Abdomen:   Soft, non-tender. Bowel sounds normal. No masses,  No organomegaly. Extremities: Extremities normal, atraumatic, no cyanosis or edema. Pulses: 2+ and symmetric all extremities. Skin: Skin color, texture, turgor normal. No rashes or lesions   Neurologic: CNII-XII intact. No gross sensory or motor deficits     Data Review:       Recent Days:  Recent Labs     02/11/23  0610 02/10/23  0525 02/08/23 2122   WBC 6.5 6.6 8.0   HGB 10.0* 9.3* 11.6   HCT 29.9* 27.7* 33.2*    267 224     Recent Labs     02/11/23  0610 02/10/23  0525 02/08/23 2122    136 138   K 4.2 4.7 4.4    106 103   CO2 20* 18* 25   GLU 86 64* 74   BUN 15 17 31*   CREA 0.97 0.79 1.47*   CA 9.3 8.7 9.8   ALB 3.0* 2.8* 3.7   TBILI 0.6 0.7 0.9   ALT 1,545* 1,120* 1,161*     No results for input(s): PH, PCO2, PO2, HCO3, FIO2 in the last 72 hours. 24 Hour Results:  Recent Results (from the past 24 hour(s))   METABOLIC PANEL, COMPREHENSIVE    Collection Time: 02/11/23  6:10 AM   Result Value Ref Range    Sodium 136 136 - 145 mmol/L    Potassium 4.2 3.5 - 5.1 mmol/L    Chloride 104 97 - 108 mmol/L    CO2 20 (L) 21 - 32 mmol/L    Anion gap 12 5 - 15 mmol/L    Glucose 86 65 - 100 mg/dL    BUN 15 6 - 20 mg/dL    Creatinine 0.97 0.55 - 1.02 mg/dL    BUN/Creatinine ratio 15 12 - 20      eGFR 58 (L) >60 ml/min/1.73m2    Calcium 9.3 8.5 - 10.1 mg/dL    Bilirubin, total 0.6 0.2 - 1.0 mg/dL    AST (SGOT) 665 (H) 15 - 37 U/L    ALT (SGPT) 1,545 (H) 12 - 78 U/L    Alk.  phosphatase 49 45 - 117 U/L    Protein, total 6.1 (L) 6.4 - 8.2 g/dL    Albumin 3.0 (L) 3.5 - 5.0 g/dL    Globulin 3.1 2.0 - 4.0 g/dL    A-G Ratio 1.0 (L) 1.1 - 2.2     CBC WITH AUTOMATED DIFF    Collection Time: 02/11/23  6:10 AM   Result Value Ref Range    WBC 6.5 3.6 - 11.0 K/uL    RBC 3.51 (L) 3.80 - 5.20 M/uL    HGB 10.0 (L) 11.5 - 16.0 g/dL    HCT 29.9 (L) 35.0 - 47.0 %    MCV 85.2 80.0 - 99.0 FL    MCH 28.5 26.0 - 34.0 PG    MCHC 33.4 30.0 - 36.5 g/dL    RDW 17.5 (H) 11.5 - 14.5 %    PLATELET 555 694 - 147 K/uL    MPV 10.3 8.9 - 12.9 FL    NRBC 0.0 0.0  WBC    ABSOLUTE NRBC 0.00 0.00 - 0.01 K/uL    NEUTROPHILS 59 32 - 75 %    LYMPHOCYTES 27 12 - 49 %    MONOCYTES 6 5 - 13 %    EOSINOPHILS 1 0 - 7 %    BASOPHILS 1 0 - 1 %    IMMATURE GRANULOCYTES 6 (H) 0 - 0.5 %    ABS. NEUTROPHILS 3.7 1.8 - 8.0 K/UL    ABS. LYMPHOCYTES 1.8 0.8 - 3.5 K/UL    ABS. MONOCYTES 0.4 0.0 - 1.0 K/UL    ABS. EOSINOPHILS 0.1 0.0 - 0.4 K/UL    ABS. BASOPHILS 0.1 0.0 - 0.1 K/UL    ABS. IMM. GRANS. 0.4 (H) 0.00 - 0.04 K/UL    DF AUTOMATED      RBC COMMENTS Normocytic, Normochromic             Assessment/Plan:     Acute hepatitis  Severely elevated ALT/AST with normal bilirubin noted. Negative acute hepatitis panel along with right upper quadrant ultrasound. Possibly secondary to severe dehydration and poor oral intake along with severe constipation. However, very likely that this is secondary to side effect from Paxlovid. Significant elevation in transaminases is known with Ritonavir. Continue to monitor as patient does not have any abdominal pain, jaundice or hyperbilirubinemia. Acute renal failure  Likely secondary to side effects from medication for COVID-19 as well as poor oral intake. Administer gentle IV fluids and recheck in a.m. Likely prerenal azotemia noted. Stage IV metastatic Small cell lung cancer  Status post right chest port for chemotherapy. Follow-up with oncology on outpatient basis. COPD  Currently compensated. Continue Spiriva and as needed inhalers.      Failure to thrive  Continue Megace daily. Generalized weakness  PT/OT recommending SNF placement per case management. DVT prophylaxis  Lovenox sC. DNR/DNI        Care Plan discussed with: Patient/Family, Nurse, and     Total time spent with patient: 30 minutes. With greater than 50% spent in coordination of care and counseling.     Jahaira Oquendo MD

## 2023-02-11 NOTE — ROUTINE PROCESS
Report was received from the offgoing nurse [facility]IC[facility] RN. Care was resumed via the incoming [facility] MARCELL TRUJILLO. The report consist of using kardex information.

## 2023-02-11 NOTE — ROUTINE PROCESS
The pt have been resting quietly in bed with n o distress noted at this time. She is showing no signs of discomfort or any increased respiratory distress. .She have been checked for incontinence & have been repositioned to comfort in bed. The purewick remains in place.

## 2023-02-11 NOTE — ROUTINE PROCESS
Bedside shift change report given to saeid prajapati(oncoming nurse) by hans (offgoing nurse). Report included the following information Kardex.

## 2023-02-12 LAB
ALBUMIN SERPL-MCNC: 3.2 G/DL (ref 3.5–5)
ALBUMIN/GLOB SERPL: 1 (ref 1.1–2.2)
ALP SERPL-CCNC: 55 U/L (ref 45–117)
ALT SERPL-CCNC: 1665 U/L (ref 12–78)
ANION GAP SERPL CALC-SCNC: 10 MMOL/L (ref 5–15)
AST SERPL W P-5'-P-CCNC: 582 U/L (ref 15–37)
BASOPHILS # BLD: 0 K/UL (ref 0–0.1)
BASOPHILS NFR BLD: 0 % (ref 0–1)
BILIRUB SERPL-MCNC: 0.5 MG/DL (ref 0.2–1)
BUN SERPL-MCNC: 19 MG/DL (ref 6–20)
BUN/CREAT SERPL: 19 (ref 12–20)
CA-I BLD-MCNC: 9.5 MG/DL (ref 8.5–10.1)
CHLORIDE SERPL-SCNC: 104 MMOL/L (ref 97–108)
CO2 SERPL-SCNC: 23 MMOL/L (ref 21–32)
CREAT SERPL-MCNC: 0.99 MG/DL (ref 0.55–1.02)
DIFFERENTIAL METHOD BLD: ABNORMAL
EOSINOPHIL # BLD: 0 K/UL (ref 0–0.4)
EOSINOPHIL NFR BLD: 0 % (ref 0–7)
ERYTHROCYTE [DISTWIDTH] IN BLOOD BY AUTOMATED COUNT: 17.7 % (ref 11.5–14.5)
GLOBULIN SER CALC-MCNC: 3.2 G/DL (ref 2–4)
GLUCOSE SERPL-MCNC: 102 MG/DL (ref 65–100)
HCT VFR BLD AUTO: 29.5 % (ref 35–47)
HGB BLD-MCNC: 10 G/DL (ref 11.5–16)
IMM GRANULOCYTES # BLD AUTO: 0.3 K/UL (ref 0–0.04)
IMM GRANULOCYTES NFR BLD AUTO: 5 % (ref 0–0.5)
LYMPHOCYTES # BLD: 1.9 K/UL (ref 0.8–3.5)
LYMPHOCYTES NFR BLD: 29 % (ref 12–49)
MCH RBC QN AUTO: 28.8 PG (ref 26–34)
MCHC RBC AUTO-ENTMCNC: 33.9 G/DL (ref 30–36.5)
MCV RBC AUTO: 85 FL (ref 80–99)
MONOCYTES # BLD: 0.4 K/UL (ref 0–1)
MONOCYTES NFR BLD: 7 % (ref 5–13)
NEUTS SEG # BLD: 3.8 K/UL (ref 1.8–8)
NEUTS SEG NFR BLD: 59 % (ref 32–75)
NRBC # BLD: 0 K/UL (ref 0–0.01)
NRBC BLD-RTO: 0 PER 100 WBC
PLATELET # BLD AUTO: 277 K/UL (ref 150–400)
PMV BLD AUTO: 9.8 FL (ref 8.9–12.9)
POTASSIUM SERPL-SCNC: 4.1 MMOL/L (ref 3.5–5.1)
PROT SERPL-MCNC: 6.4 G/DL (ref 6.4–8.2)
RBC # BLD AUTO: 3.47 M/UL (ref 3.8–5.2)
RBC MORPH BLD: ABNORMAL
SODIUM SERPL-SCNC: 137 MMOL/L (ref 136–145)
WBC # BLD AUTO: 6.4 K/UL (ref 3.6–11)

## 2023-02-12 PROCEDURE — 74011250637 HC RX REV CODE- 250/637: Performed by: INTERNAL MEDICINE

## 2023-02-12 PROCEDURE — 74011250636 HC RX REV CODE- 250/636: Performed by: HOSPITALIST

## 2023-02-12 PROCEDURE — 85025 COMPLETE CBC W/AUTO DIFF WBC: CPT

## 2023-02-12 PROCEDURE — 80053 COMPREHEN METABOLIC PANEL: CPT

## 2023-02-12 PROCEDURE — 65270000029 HC RM PRIVATE

## 2023-02-12 PROCEDURE — 74011000250 HC RX REV CODE- 250: Performed by: EMERGENCY MEDICINE

## 2023-02-12 PROCEDURE — 94640 AIRWAY INHALATION TREATMENT: CPT

## 2023-02-12 PROCEDURE — 36592 COLLECT BLOOD FROM PICC: CPT

## 2023-02-12 RX ADMIN — TIOTROPIUM BROMIDE INHALATION SPRAY 2 PUFF: 3.12 SPRAY, METERED RESPIRATORY (INHALATION) at 08:00

## 2023-02-12 RX ADMIN — ENOXAPARIN SODIUM 40 MG: 100 INJECTION SUBCUTANEOUS at 08:01

## 2023-02-12 RX ADMIN — Medication 10 ML: at 21:55

## 2023-02-12 RX ADMIN — DOCUSATE SODIUM 100 MG: 50 LIQUID ORAL at 08:01

## 2023-02-12 RX ADMIN — POLYETHYLENE GLYCOL 3350 17 G: 17 POWDER, FOR SOLUTION ORAL at 08:01

## 2023-02-12 RX ADMIN — SENNOSIDES 8.6 MG: 8.6 TABLET, COATED ORAL at 08:01

## 2023-02-12 RX ADMIN — Medication 10 ML: at 05:52

## 2023-02-12 RX ADMIN — Medication 10 ML: at 13:42

## 2023-02-12 NOTE — ROUTINE PROCESS
Bedside shift change report given to saeid prajapati (oncoming nurse) by Lizzeth sullivan (offgoing nurse). Report included the following information Kardex.

## 2023-02-12 NOTE — ROUTINE PROCESS
SBAR report received from E. Caroline Goltz LPN on patient condition and current treatment plan per Kardex.

## 2023-02-12 NOTE — PROGRESS NOTES
Problem: Falls - Risk of  Goal: *Absence of Falls  Description: Document Marianne Mendoza Fall Risk and appropriate interventions in the flowsheet.   Outcome: Progressing Towards Goal  Note: Fall Risk Interventions:  Mobility Interventions: Patient to call before getting OOB, Utilize walker, cane, or other assistive device    Mentation Interventions: Adequate sleep, hydration, pain control, More frequent rounding, Reorient patient, Toileting rounds    Medication Interventions: Patient to call before getting OOB    Elimination Interventions: Call light in reach, Patient to call for help with toileting needs, Toileting schedule/hourly rounds

## 2023-02-12 NOTE — PROGRESS NOTES
Problem: Pressure Injury - Risk of  Goal: *Prevention of pressure injury  Description: Document Ravi Scale and appropriate interventions in the flowsheet. Outcome: Progressing Towards Goal  Note: Pressure Injury Interventions:  Sensory Interventions: Keep linens dry and wrinkle-free, Maintain/enhance activity level, Minimize linen layers, Assess changes in LOC, Check visual cues for pain    Moisture Interventions: Absorbent underpads, Check for incontinence Q2 hours and as needed, Internal/External urinary devices, Minimize layers, Moisture barrier    Activity Interventions: Increase time out of bed    Mobility Interventions: HOB 30 degrees or less, Turn and reposition approx. every two hours(pillow and wedges)    Nutrition Interventions: Document food/fluid/supplement intake, Offer support with meals,snacks and hydration    Friction and Shear Interventions: Apply protective barrier, creams and emollients, HOB 30 degrees or less, Minimize layers                Problem: Airway Clearance - Ineffective  Goal: Achieve or maintain patent airway  Outcome: Progressing Towards Goal     Problem: Gas Exchange - Impaired  Goal: Absence of hypoxia  Outcome: Progressing Towards Goal  Goal: Promote optimal lung function  Outcome: Progressing Towards Goal     Problem:  Body Temperature -  Risk of, Imbalanced  Goal: Ability to maintain a body temperature within defined limits  Outcome: Progressing Towards Goal  Goal: Will regain or maintain usual level of consciousness  Outcome: Progressing Towards Goal  Goal: Complications related to the disease process, condition or treatment will be avoided or minimized  Outcome: Progressing Towards Goal     Problem: Isolation Precautions - Risk of Spread of Infection  Goal: Prevent transmission of infectious organism to others  Outcome: Progressing Towards Goal     Problem: Nutrition Deficits  Goal: Optimize nutrtional status  Outcome: Progressing Towards Goal     Problem: Falls - Risk of  Goal: *Absence of Falls  Description: Document [facility] Records Fall Risk and appropriate interventions in the flowsheet.   Outcome: Progressing Towards Goal  Note: Fall Risk Interventions:  Mobility Interventions: Patient to call before getting OOB, Utilize walker, cane, or other assistive device    Mentation Interventions: Adequate sleep, hydration, pain control, More frequent rounding, Reorient patient, Toileting rounds    Medication Interventions: Patient to call before getting OOB    Elimination Interventions: Call light in reach, Patient to call for help with toileting needs, Toileting schedule/hourly rounds              Problem: Fatigue  Goal: Verbalize increase energy and improved vitality  Outcome: Progressing Towards Goal

## 2023-02-12 NOTE — ROUTINE PROCESS
Has rested, reoriented to place a couple of times, denies pain. Assisted with turns, patient able to turn self but needs encouragement.

## 2023-02-12 NOTE — PROGRESS NOTES
Progress Note  Date:2/12/2023       Room:[facility]  Patient Name:Oanh Pulido     YOB: 1940     Age:82 y.o. Subjective    As per admitting provider on 2/9:  Kati Zambrano is a 80 y.o. 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 Patient is alert and oriented x3 however poor historian. Majority of the ROS and HPI obtained from patient's daughter who is at bedside in the ED. Daughter states that over the last 6 days, since discharge from the hospital at [facility], patient has not been doing well with decreased oral intake, nausea and vomiting periodically along with abdominal discomfort. She denies any fever/chills, chest pain, diarrhea, any new rash. Patient in the ED with a significant CT abdomen/pelvis finding of severe large amount of stool in the rectum with severe constipation however chest x-ray otherwise unremarkable for any acute process. Patient also found to have acute renal failure with an increase in creatinine up to 1.47 along with BUN elevated at 31 in addition to severe acute hepatitis with AST/ALT of 410/1161. LFTs upon discharge from [facility] were completely normal and creatinine was 0.97. Daughter states that the patient has been compliant with her COVID-19 medication Paxlovid and has only 1 more day to finish this medication. In the ED patient is not found to be hypoxic and with normotensive blood pressures and is essentially afebrile without a cough or any respiratory distress. She did have multiple bowel movements in the ED with improvement in her abdominal pain and discomfort noted.      Patient is admitted to the hospital with acute renal failure, acute hepatitis of unknown etiology and with severe constipation. 2/12: seen on follow up. Resting in bed no acute distress. Has had several bowel movements and PO intake has improved. AST on downward trend but AST still elevated . Denies any nausea, vomiting or abdominal pain     Review of Systems   Constitutional: Negative. HENT: Negative. Eyes: Negative. Respiratory: Negative. Cardiovascular: Negative. Endocrine: Negative. Genitourinary: Negative. Musculoskeletal: Negative. Skin: Negative. Allergic/Immunologic: Negative. Neurological:  Positive for weakness. Psychiatric/Behavioral: Negative. All other systems reviewed and are negative. Objective           Vitals Last 24 Hours:  Patient Vitals for the past 24 hrs:   Temp Pulse Resp BP SpO2   02/12/23 1128 98.5 °F (36.9 °C) 71 18 130/70 99 %   02/12/23 0800 -- -- -- -- 97 %   02/12/23 0754 96.9 °F (36.1 °C) 66 18 (!) 117/53 98 %   02/12/23 0358 97.7 °F (36.5 °C) 65 18 123/69 96 %   02/11/23 2340 98.2 °F (36.8 °C) 64 17 (!) 112/59 99 %   02/11/23 1952 97.5 °F (36.4 °C) 63 18 111/60 96 %   02/11/23 1535 97.3 °F (36.3 °C) 66 18 (!) 106/56 98 %          I/O (24Hr): Intake/Output Summary (Last 24 hours) at 2/12/2023 1257  Last data filed at 2/12/2023 1027  Gross per 24 hour   Intake 590 ml   Output 151 ml   Net 439 ml         Physical Exam:  General: Alert, cooperative, no distress, appears stated age. Head:  Normocephalic, without obvious abnormality, atraumatic. Eyes:  Conjunctivae/corneas clear. Pupils equal, round, reactive to light. Extraocular movements intact. Lungs: Diminished breath sounds bilaterally but no wheezes rales rhonchi  Chest wall: No tenderness or deformity. Heart:  Regular rate and rhythm, S1, S2 normal, no murmur, click, rub or gallop. Abdomen:  Soft, non-tender. Bowel sounds normal. No masses,  No organomegaly. Extremities: Extremities normal, atraumatic, no cyanosis or edema. Pulses: 2+ and symmetric all extremities.   Skin: Skin color, texture, turgor normal. No rashes or lesions  Neurologic: Awake, Alert, oriented. No obvious gross sensory or motor deficits      Medications           Current Facility-Administered Medications   Medication Dose Route Frequency    enoxaparin (LOVENOX) injection 40 mg  40 mg SubCUTAneous DAILY    polyethylene glycol (MIRALAX) packet 17 g  17 g Oral DAILY    sodium chloride (NS) flush 5-40 mL  5-40 mL IntraVENous Q8H    sodium chloride (NS) flush 5-40 mL  5-40 mL IntraVENous PRN    polyethylene glycol (MIRALAX) packet 17 g  17 g Oral DAILY PRN    ondansetron (ZOFRAN ODT) tablet 4 mg  4 mg Oral Q8H PRN    Or    ondansetron (ZOFRAN) injection 4 mg  4 mg IntraVENous Q6H PRN    docusate (COLACE) 50 mg/5 mL oral liquid 100 mg  100 mg Oral DAILY    senna (SENOKOT) tablet 8.6 mg  1 Tablet Oral DAILY    tiotropium bromide (SPIRIVA RESPIMAT) 2.5 mcg /actuation  2 Puff Inhalation DAILY         Allergies         Pcn [penicillins] and Tramadol       Labs/Imaging/Diagnostics      Labs:  Recent Results (from the past 48 hour(s))   METABOLIC PANEL, COMPREHENSIVE    Collection Time: 02/11/23  6:10 AM   Result Value Ref Range    Sodium 136 136 - 145 mmol/L    Potassium 4.2 3.5 - 5.1 mmol/L    Chloride 104 97 - 108 mmol/L    CO2 20 (L) 21 - 32 mmol/L    Anion gap 12 5 - 15 mmol/L    Glucose 86 65 - 100 mg/dL    BUN 15 6 - 20 mg/dL    Creatinine 0.97 0.55 - 1.02 mg/dL    BUN/Creatinine ratio 15 12 - 20      eGFR 58 (L) >60 ml/min/1.73m2    Calcium 9.3 8.5 - 10.1 mg/dL    Bilirubin, total 0.6 0.2 - 1.0 mg/dL    AST (SGOT) 665 (H) 15 - 37 U/L    ALT (SGPT) 1,545 (H) 12 - 78 U/L    Alk.  phosphatase 49 45 - 117 U/L    Protein, total 6.1 (L) 6.4 - 8.2 g/dL    Albumin 3.0 (L) 3.5 - 5.0 g/dL    Globulin 3.1 2.0 - 4.0 g/dL    A-G Ratio 1.0 (L) 1.1 - 2.2     CBC WITH AUTOMATED DIFF    Collection Time: 02/11/23  6:10 AM   Result Value Ref Range    WBC 6.5 3.6 - 11.0 K/uL    RBC 3.51 (L) 3.80 - 5.20 M/uL    HGB 10.0 (L) 11.5 - 16.0 g/dL    HCT 29.9 (L) 35.0 - 47.0 %    MCV 85.2 80.0 - 99.0 FL    MCH 28.5 26.0 - 34.0 PG    MCHC 33.4 30.0 - 36.5 g/dL    RDW 17.5 (H) 11.5 - 14.5 %    PLATELET 537 035 - 683 K/uL    MPV 10.3 8.9 - 12.9 FL    NRBC 0.0 0.0  WBC    ABSOLUTE NRBC 0.00 0.00 - 0.01 K/uL    NEUTROPHILS 59 32 - 75 %    LYMPHOCYTES 27 12 - 49 %    MONOCYTES 6 5 - 13 %    EOSINOPHILS 1 0 - 7 %    BASOPHILS 1 0 - 1 %    IMMATURE GRANULOCYTES 6 (H) 0 - 0.5 %    ABS. NEUTROPHILS 3.7 1.8 - 8.0 K/UL    ABS. LYMPHOCYTES 1.8 0.8 - 3.5 K/UL    ABS. MONOCYTES 0.4 0.0 - 1.0 K/UL    ABS. EOSINOPHILS 0.1 0.0 - 0.4 K/UL    ABS. BASOPHILS 0.1 0.0 - 0.1 K/UL    ABS. IMM. GRANS. 0.4 (H) 0.00 - 0.04 K/UL    DF AUTOMATED      RBC COMMENTS Normocytic, Normochromic     METABOLIC PANEL, COMPREHENSIVE    Collection Time: 02/12/23  6:30 AM   Result Value Ref Range    Sodium 137 136 - 145 mmol/L    Potassium 4.1 3.5 - 5.1 mmol/L    Chloride 104 97 - 108 mmol/L    CO2 23 21 - 32 mmol/L    Anion gap 10 5 - 15 mmol/L    Glucose 102 (H) 65 - 100 mg/dL    BUN 19 6 - 20 mg/dL    Creatinine 0.99 0.55 - 1.02 mg/dL    BUN/Creatinine ratio 19 12 - 20      eGFR 57 (L) >60 ml/min/1.73m2    Calcium 9.5 8.5 - 10.1 mg/dL    Bilirubin, total 0.5 0.2 - 1.0 mg/dL    AST (SGOT) 582 (H) 15 - 37 U/L    ALT (SGPT) 1,665 (H) 12 - 78 U/L    Alk.  phosphatase 55 45 - 117 U/L    Protein, total 6.4 6.4 - 8.2 g/dL    Albumin 3.2 (L) 3.5 - 5.0 g/dL    Globulin 3.2 2.0 - 4.0 g/dL    A-G Ratio 1.0 (L) 1.1 - 2.2     CBC WITH AUTOMATED DIFF    Collection Time: 02/12/23  6:30 AM   Result Value Ref Range    WBC 6.4 3.6 - 11.0 K/uL    RBC 3.47 (L) 3.80 - 5.20 M/uL    HGB 10.0 (L) 11.5 - 16.0 g/dL    HCT 29.5 (L) 35.0 - 47.0 %    MCV 85.0 80.0 - 99.0 FL    MCH 28.8 26.0 - 34.0 PG    MCHC 33.9 30.0 - 36.5 g/dL    RDW 17.7 (H) 11.5 - 14.5 %    PLATELET 507 002 - 444 K/uL    MPV 9.8 8.9 - 12.9 FL    NRBC 0.0 0.0  WBC    ABSOLUTE NRBC 0.00 0.00 - 0.01 K/uL    NEUTROPHILS 59 32 - 75 %    LYMPHOCYTES 29 12 - 49 %    MONOCYTES 7 5 - 13 %    EOSINOPHILS 0 0 - 7 %    BASOPHILS 0 0 - 1 %    IMMATURE GRANULOCYTES 5 (H) 0 - 0.5 %    ABS. NEUTROPHILS 3.8 1.8 - 8.0 K/UL    ABS. LYMPHOCYTES 1.9 0.8 - 3.5 K/UL    ABS. MONOCYTES 0.4 0.0 - 1.0 K/UL    ABS. EOSINOPHILS 0.0 0.0 - 0.4 K/UL    ABS. BASOPHILS 0.0 0.0 - 0.1 K/UL    ABS. IMM. GRANS. 0.3 (H) 0.00 - 0.04 K/UL    DF AUTOMATED      RBC COMMENTS Normocytic, Normochromic          Imaging:  XR ABD (KUB)    Result Date: 2/8/2023  Moderate amount of stool in the distal colon and rectum. CT ABD PELV W CONT    Result Date: 2/9/2023  1. Unchanged 10 mm right lower lobe pulmonary nodule. 2. Small hiatal hernia. 3. Large amount of stool in the rectum. XR CHEST PORT    Result Date: 2/8/2023  No acute process    US RUQ    Result Date: 2/9/2023  1. Unremarkable appearance of the liver. 2.  Borderline thickening of the gallbladder wall is likely reactive to reported hepatitis. Assessment//Plan           Problem List:  Hospital Problems  Date Reviewed: 2/2/2023            Codes Class Noted POA    Nausea & vomiting ICD-10-CM: R11.2  ICD-9-CM: 787.01  2/9/2023 Unknown        Acute kidney injury [facility]) ICD-10-CM: N17.9  ICD-9-CM: 584.9  2/9/2023 Unknown        * (Principal) Acute hepatitis ICD-10-CM: B17.9  ICD-9-CM: 570  2/9/2023 Unknown        Constipation ICD-10-CM: K59.00  ICD-9-CM: 564.00  2/9/2023 Unknown        Chronic obstructive pulmonary disease, unspecified [facility] type [facility] ICD-10-CM: J44.9  ICD-9-CM: 263  2/8/2023 Yes        Lung mass ICD-10-CM: R91.8  ICD-9-CM: 786.6  3/8/2022 Yes        GERD (gastroesophageal reflux disease) ICD-10-CM: K21.9  ICD-9-CM: 530.81  8/28/2020 Yes        Hypertensive disorder ICD-10-CM: I10  ICD-9-CM: 401.9  8/28/2020 Yes         Acute hepatitis  - Severely elevated ALT/AST with normal bilirubin noted.   - Will obtain acetaminophen level, acute hepatitis panel along with right upper quadrant ultrasound.  -Most likely secondary to recent start of Paxlovid which does and can cause abdominal pain nausea vomiting and acute hepatitis or according to medication profile  -Also nausea vomiting its possibly secondary to the severe constipation  - Significant elevation in transaminases is known with Ritonavir. And received last dose yesterday  -GI consult appreciated  -Follow daily LFTs  - 2/12: AST on downward trend but ALT increased . Continue to follow trend      Acute renal failure  - Likely secondary to side effects from medication for COVID-19 as well as poor oral intake secondary to nausea vomiting/constipation  -Given IV fluids overnight and repeat labs are in normal range so we will discontinue on 2/10  - stable bun/creat on 2/12     Stage IV metastatic Small cell lung cancer  - Status post right chest port for chemotherapy. - Follow-up with oncology on outpatient basis. -Delaying any further treatments until patient can get stronger post COVID     COPD  - Currently compensated. - Continue Spiriva and as needed inhalers. Failure to thrive  -Was placed on Megace but daughter states it really did not help so we will discontinue  -Nutrition consult appreciated    Generalized weakness  -Secondary to her underlying lung cancer as well as recent COVID and nausea vomiting diarrhea  -PT OT evaluation appreciated and recommend skilled rehab and awaiting placement     DVT prophylaxis  Lovenox sC. DNR/DNI  Discussed CODE STATUS extensively with patient's daughter and patient. Patient's daughter returned back to the nursing station later in the day and stated that the patient would be DNR and signed DNR paperwork. Spent 35 minutes evaluting and coordinating patient care of which >50% was spent coordinating and counseling.           Electronically signed by Renetta Jones MD on 2/12/2023 at 3:45 PM

## 2023-02-13 LAB
ALBUMIN SERPL-MCNC: 3 G/DL (ref 3.5–5)
ALBUMIN/GLOB SERPL: 0.9 (ref 1.1–2.2)
ALP SERPL-CCNC: 55 U/L (ref 45–117)
ALT SERPL-CCNC: 1376 U/L (ref 12–78)
ANION GAP SERPL CALC-SCNC: 9 MMOL/L (ref 5–15)
AST SERPL W P-5'-P-CCNC: 361 U/L (ref 15–37)
BILIRUB SERPL-MCNC: 0.4 MG/DL (ref 0.2–1)
BUN SERPL-MCNC: 19 MG/DL (ref 6–20)
BUN/CREAT SERPL: 19 (ref 12–20)
CA-I BLD-MCNC: 9.1 MG/DL (ref 8.5–10.1)
CHLORIDE SERPL-SCNC: 103 MMOL/L (ref 97–108)
CO2 SERPL-SCNC: 23 MMOL/L (ref 21–32)
CREAT SERPL-MCNC: 0.99 MG/DL (ref 0.55–1.02)
GLOBULIN SER CALC-MCNC: 3.2 G/DL (ref 2–4)
GLUCOSE SERPL-MCNC: 105 MG/DL (ref 65–100)
MAGNESIUM SERPL-MCNC: 1.8 MG/DL (ref 1.6–2.4)
POTASSIUM SERPL-SCNC: 3.9 MMOL/L (ref 3.5–5.1)
PROT SERPL-MCNC: 6.2 G/DL (ref 6.4–8.2)
SODIUM SERPL-SCNC: 135 MMOL/L (ref 136–145)

## 2023-02-13 PROCEDURE — 74011250637 HC RX REV CODE- 250/637: Performed by: INTERNAL MEDICINE

## 2023-02-13 PROCEDURE — 74011250637 HC RX REV CODE- 250/637: Performed by: HOSPITALIST

## 2023-02-13 PROCEDURE — 36592 COLLECT BLOOD FROM PICC: CPT

## 2023-02-13 PROCEDURE — 94640 AIRWAY INHALATION TREATMENT: CPT

## 2023-02-13 PROCEDURE — 97110 THERAPEUTIC EXERCISES: CPT

## 2023-02-13 PROCEDURE — 65270000029 HC RM PRIVATE

## 2023-02-13 PROCEDURE — 83735 ASSAY OF MAGNESIUM: CPT

## 2023-02-13 PROCEDURE — 74011000250 HC RX REV CODE- 250: Performed by: EMERGENCY MEDICINE

## 2023-02-13 PROCEDURE — 97530 THERAPEUTIC ACTIVITIES: CPT

## 2023-02-13 PROCEDURE — 74011250636 HC RX REV CODE- 250/636: Performed by: HOSPITALIST

## 2023-02-13 PROCEDURE — 80053 COMPREHEN METABOLIC PANEL: CPT

## 2023-02-13 PROCEDURE — 36415 COLL VENOUS BLD VENIPUNCTURE: CPT

## 2023-02-13 RX ORDER — LANOLIN ALCOHOL/MO/W.PET/CERES
400 CREAM (GRAM) TOPICAL DAILY
Status: DISCONTINUED | OUTPATIENT
Start: 2023-02-13 | End: 2023-02-16 | Stop reason: HOSPADM

## 2023-02-13 RX ADMIN — SENNOSIDES 8.6 MG: 8.6 TABLET, COATED ORAL at 08:22

## 2023-02-13 RX ADMIN — Medication 10 ML: at 06:28

## 2023-02-13 RX ADMIN — DOCUSATE SODIUM 100 MG: 50 LIQUID ORAL at 08:22

## 2023-02-13 RX ADMIN — ENOXAPARIN SODIUM 40 MG: 100 INJECTION SUBCUTANEOUS at 08:22

## 2023-02-13 RX ADMIN — TIOTROPIUM BROMIDE INHALATION SPRAY 2 PUFF: 3.12 SPRAY, METERED RESPIRATORY (INHALATION) at 08:11

## 2023-02-13 RX ADMIN — MAGNESIUM OXIDE 400 MG: 400 TABLET ORAL at 10:47

## 2023-02-13 RX ADMIN — Medication 10 ML: at 13:58

## 2023-02-13 RX ADMIN — POLYETHYLENE GLYCOL 3350 17 G: 17 POWDER, FOR SOLUTION ORAL at 08:22

## 2023-02-13 RX ADMIN — Medication 10 ML: at 21:12

## 2023-02-13 NOTE — PROGRESS NOTES
Referral was previously faxed to [id number] [facility] Spoke with Adam Donahue at [place] [id number] and he had some concerns about the patient receiving chemo treatments and the transportation for the treatments. Adam Donahue stated that they could take the patient if the daughter agreed to the transportation. He also stated that he would start the authorization once her receives the PT note for today. Adam Donahue requested that the daughter call him.  called daughter and left a message for her to call back. 1200- PT note for today faxed to [facility].

## 2023-02-13 NOTE — PROGRESS NOTES
Progress Note  Date:2/13/2023       Room:[facility]  Patient Name:Oanh Pulido     YOB: 1940     Age:82 y.o. Subjective    As per admitting provider on 2/9:  Radha Gaines is a 80 y.o. 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 Patient is alert and oriented x3 however poor historian. Majority of the ROS and HPI obtained from patient's daughter who is at bedside in the ED. Daughter states that over the last 6 days, since discharge from the hospital at [facility], patient has not been doing well with decreased oral intake, nausea and vomiting periodically along with abdominal discomfort. She denies any fever/chills, chest pain, diarrhea, any new rash. Patient in the ED with a significant CT abdomen/pelvis finding of severe large amount of stool in the rectum with severe constipation however chest x-ray otherwise unremarkable for any acute process. Patient also found to have acute renal failure with an increase in creatinine up to 1.47 along with BUN elevated at 31 in addition to severe acute hepatitis with AST/ALT of 410/1161. LFTs upon discharge from [facility] were completely normal and creatinine was 0.97. Daughter states that the patient has been compliant with her COVID-19 medication Paxlovid and has only 1 more day to finish this medication. In the ED patient is not found to be hypoxic and with normotensive blood pressures and is essentially afebrile without a cough or any respiratory distress. She did have multiple bowel movements in the ED with improvement in her abdominal pain and discomfort noted.      Patient is admitted to the hospital with acute renal failure, acute hepatitis of unknown etiology and with severe constipation. 2/13: Seen on follow-up resting in bed states appetite has improved and no further episodes of nausea vomiting. Continues to be weak and PT evaluated and continues recommend skilled rehab and awaiting for placement. Review of Systems   Constitutional: Negative. HENT: Negative. Eyes: Negative. Respiratory: Negative. Cardiovascular: Negative. Endocrine: Negative. Genitourinary: Negative. Musculoskeletal: Negative. Skin: Negative. Allergic/Immunologic: Negative. Neurological:  Positive for weakness. Psychiatric/Behavioral: Negative. All other systems reviewed and are negative. Objective           Vitals Last 24 Hours:  Patient Vitals for the past 24 hrs:   Temp Pulse Resp BP SpO2   02/13/23 1219 96.8 °F (36 °C) 82 18 120/70 97 %   02/13/23 0811 -- -- -- -- 98 %   02/13/23 0800 -- -- -- -- 98 %   02/13/23 0759 96.8 °F (36 °C) 65 16 121/61 98 %   02/13/23 0413 98.3 °F (36.8 °C) 62 18 (!) 113/56 99 %   02/12/23 2313 97.5 °F (36.4 °C) 65 18 (!) 101/57 98 %   02/12/23 1922 98 °F (36.7 °C) 69 18 105/62 99 %   02/12/23 1632 98.3 °F (36.8 °C) 62 18 (!) 104/54 98 %          I/O (24Hr): Intake/Output Summary (Last 24 hours) at 2/13/2023 1226  Last data filed at 2/13/2023 0530  Gross per 24 hour   Intake 350 ml   Output 330 ml   Net 20 ml         Physical Exam:  General: Alert, cooperative, no distress, appears stated age. Head:  Normocephalic, without obvious abnormality, atraumatic. Eyes:  Conjunctivae/corneas clear. Pupils equal, round, reactive to light. Extraocular movements intact. Lungs: Diminished breath sounds bilaterally but no wheezes rales rhonchi  Chest wall: No tenderness or deformity. Heart:  Regular rate and rhythm, S1, S2 normal, no murmur, click, rub or gallop. Abdomen:  Soft, non-tender. Bowel sounds normal. No masses,  No organomegaly. Extremities: Extremities normal, atraumatic, no cyanosis or edema.   Pulses: 2+ and symmetric all extremities. Skin: Skin color, texture, turgor normal. No rashes or lesions  Neurologic: Awake, Alert, oriented. No obvious gross sensory or motor deficits      Medications           Current Facility-Administered Medications   Medication Dose Route Frequency    magnesium oxide (MAG-OX) tablet 400 mg  400 mg Oral DAILY    enoxaparin (LOVENOX) injection 40 mg  40 mg SubCUTAneous DAILY    polyethylene glycol (MIRALAX) packet 17 g  17 g Oral DAILY    sodium chloride (NS) flush 5-40 mL  5-40 mL IntraVENous Q8H    sodium chloride (NS) flush 5-40 mL  5-40 mL IntraVENous PRN    polyethylene glycol (MIRALAX) packet 17 g  17 g Oral DAILY PRN    ondansetron (ZOFRAN ODT) tablet 4 mg  4 mg Oral Q8H PRN    Or    ondansetron (ZOFRAN) injection 4 mg  4 mg IntraVENous Q6H PRN    docusate (COLACE) 50 mg/5 mL oral liquid 100 mg  100 mg Oral DAILY    senna (SENOKOT) tablet 8.6 mg  1 Tablet Oral DAILY    tiotropium bromide (SPIRIVA RESPIMAT) 2.5 mcg /actuation  2 Puff Inhalation DAILY         Allergies         Pcn [penicillins] and Tramadol       Labs/Imaging/Diagnostics      Labs:  Recent Results (from the past 48 hour(s))   METABOLIC PANEL, COMPREHENSIVE    Collection Time: 02/12/23  6:30 AM   Result Value Ref Range    Sodium 137 136 - 145 mmol/L    Potassium 4.1 3.5 - 5.1 mmol/L    Chloride 104 97 - 108 mmol/L    CO2 23 21 - 32 mmol/L    Anion gap 10 5 - 15 mmol/L    Glucose 102 (H) 65 - 100 mg/dL    BUN 19 6 - 20 mg/dL    Creatinine 0.99 0.55 - 1.02 mg/dL    BUN/Creatinine ratio 19 12 - 20      eGFR 57 (L) >60 ml/min/1.73m2    Calcium 9.5 8.5 - 10.1 mg/dL    Bilirubin, total 0.5 0.2 - 1.0 mg/dL    AST (SGOT) 582 (H) 15 - 37 U/L    ALT (SGPT) 1,665 (H) 12 - 78 U/L    Alk.  phosphatase 55 45 - 117 U/L    Protein, total 6.4 6.4 - 8.2 g/dL    Albumin 3.2 (L) 3.5 - 5.0 g/dL    Globulin 3.2 2.0 - 4.0 g/dL    A-G Ratio 1.0 (L) 1.1 - 2.2     CBC WITH AUTOMATED DIFF    Collection Time: 02/12/23  6:30 AM   Result Value Ref Range    WBC 6.4 3.6 - 11.0 K/uL    RBC 3.47 (L) 3.80 - 5.20 M/uL    HGB 10.0 (L) 11.5 - 16.0 g/dL    HCT 29.5 (L) 35.0 - 47.0 %    MCV 85.0 80.0 - 99.0 FL    MCH 28.8 26.0 - 34.0 PG    MCHC 33.9 30.0 - 36.5 g/dL    RDW 17.7 (H) 11.5 - 14.5 %    PLATELET 433 358 - 463 K/uL    MPV 9.8 8.9 - 12.9 FL    NRBC 0.0 0.0  WBC    ABSOLUTE NRBC 0.00 0.00 - 0.01 K/uL    NEUTROPHILS 59 32 - 75 %    LYMPHOCYTES 29 12 - 49 %    MONOCYTES 7 5 - 13 %    EOSINOPHILS 0 0 - 7 %    BASOPHILS 0 0 - 1 %    IMMATURE GRANULOCYTES 5 (H) 0 - 0.5 %    ABS. NEUTROPHILS 3.8 1.8 - 8.0 K/UL    ABS. LYMPHOCYTES 1.9 0.8 - 3.5 K/UL    ABS. MONOCYTES 0.4 0.0 - 1.0 K/UL    ABS. EOSINOPHILS 0.0 0.0 - 0.4 K/UL    ABS. BASOPHILS 0.0 0.0 - 0.1 K/UL    ABS. IMM. GRANS. 0.3 (H) 0.00 - 0.04 K/UL    DF AUTOMATED      RBC COMMENTS Normocytic, Normochromic     METABOLIC PANEL, COMPREHENSIVE    Collection Time: 02/13/23  5:05 AM   Result Value Ref Range    Sodium 135 (L) 136 - 145 mmol/L    Potassium 3.9 3.5 - 5.1 mmol/L    Chloride 103 97 - 108 mmol/L    CO2 23 21 - 32 mmol/L    Anion gap 9 5 - 15 mmol/L    Glucose 105 (H) 65 - 100 mg/dL    BUN 19 6 - 20 mg/dL    Creatinine 0.99 0.55 - 1.02 mg/dL    BUN/Creatinine ratio 19 12 - 20      eGFR 57 (L) >60 ml/min/1.73m2    Calcium 9.1 8.5 - 10.1 mg/dL    Bilirubin, total 0.4 0.2 - 1.0 mg/dL    AST (SGOT) 361 (H) 15 - 37 U/L    ALT (SGPT) 1,376 (H) 12 - 78 U/L    Alk. phosphatase 55 45 - 117 U/L    Protein, total 6.2 (L) 6.4 - 8.2 g/dL    Albumin 3.0 (L) 3.5 - 5.0 g/dL    Globulin 3.2 2.0 - 4.0 g/dL    A-G Ratio 0.9 (L) 1.1 - 2.2     MAGNESIUM    Collection Time: 02/13/23  5:05 AM   Result Value Ref Range    Magnesium 1.8 1.6 - 2.4 mg/dL        Imaging:  XR ABD (KUB)    Result Date: 2/8/2023  Moderate amount of stool in the distal colon and rectum. CT ABD PELV W CONT    Result Date: 2/9/2023  1. Unchanged 10 mm right lower lobe pulmonary nodule. 2. Small hiatal hernia. 3. Large amount of stool in the rectum.     XR CHEST PORT    Result Date: 2/8/2023  No acute process    US RUQ    Result Date: 2/9/2023  1. Unremarkable appearance of the liver. 2.  Borderline thickening of the gallbladder wall is likely reactive to reported hepatitis. Assessment//Plan           Problem List:  Hospital Problems  Date Reviewed: 2/2/2023            Codes Class Noted POA    Nausea & vomiting ICD-10-CM: R11.2  ICD-9-CM: 787.01  2/9/2023 Unknown        Acute kidney injury [facility]) ICD-10-CM: N17.9  ICD-9-CM: 584.9  2/9/2023 Unknown        * (Principal) Acute hepatitis ICD-10-CM: B17.9  ICD-9-CM: 570  2/9/2023 Unknown        Constipation ICD-10-CM: K59.00  ICD-9-CM: 564.00  2/9/2023 Unknown        Chronic obstructive pulmonary disease, unspecified COPD type ([facility] ICD-10-CM: J44.9  ICD-9-CM: 239  2/8/2023 Yes        Lung mass ICD-10-CM: R91.8  ICD-9-CM: 786.6  3/8/2022 Yes        GERD (gastroesophageal reflux disease) ICD-10-CM: K21.9  ICD-9-CM: 530.81  8/28/2020 Yes        Hypertensive disorder ICD-10-CM: I10  ICD-9-CM: 401.9  8/28/2020 Yes       Acute hepatitis  - Severely elevated ALT/AST with normal bilirubin noted. - Will obtain acetaminophen level, acute hepatitis panel along with right upper quadrant ultrasound.  -Most likely secondary to recent start of Paxlovid which does and can cause abdominal pain nausea vomiting and acute hepatitis or according to medication profile  -Also nausea vomiting its possibly secondary to the severe constipation  - Significant elevation in transaminases is known with Ritonavir.   And received last dose yesterday  -GI consult appreciated  -Follow daily LFTs  -2/13: Patient's ALT initially increased to 1665 on 2/12 but has decreased down to 1000 376 on 2/13  AST also initially increased to 6 max of 665 but since that time has been on downward trend to 361 today     Acute renal failure  - Likely secondary to side effects from medication for COVID-19 as well as poor oral intake secondary to nausea vomiting/constipation  -Given IV fluids overnight and repeat labs are in normal range so we will discontinue on 2/10  - stable bun/creat on 2/12     Stage IV metastatic Small cell lung cancer  - Status post right chest port for chemotherapy. - Follow-up with oncology on outpatient basis. -Delaying any further treatments until patient can get stronger post COVID  -From what daughter had mentioned that further treatments for her cancer are on hold until patient can get stronger and for that reason go to skilled rehab so that once has improvement in her overall weakness and general debility then can resume treatments. COPD  - Currently compensated. - Continue Spiriva and as needed inhalers. Failure to thrive  -Was placed on Megace but daughter states it really did not help so we will discontinue  -Nutrition consult appreciated  -Probably eating better we will continue to monitor closely    Generalized weakness  -Secondary to her underlying lung cancer as well as recent COVID and nausea vomiting diarrhea  -PT OT evaluation appreciated and recommend skilled rehab and awaiting placement     DVT prophylaxis  Lovenox sC. DNR/DNI  Discussed CODE STATUS extensively with patient's daughter and patient. Patient's daughter returned back to the nursing station later in the day and stated that the patient would be DNR and signed DNR paperwork. Spent 35 minutes evaluting and coordinating patient care of which >50% was spent coordinating and counseling.           Electronically signed by Erich Teague MD on 2/13/2023 at 3:45 PM

## 2023-02-13 NOTE — PROGRESS NOTES
PHYSICAL THERAPY TREATMENT  Patient: Tanisha Wilson (38 y.o. female)  Date: 2/13/2023  Diagnosis: Nausea & vomiting [R11.2]  Acute kidney injury ([address] [N17.9]  Acute hepatitis [B17.9]  Constipation [K59.00] Acute hepatitis      Precautions:    Chart, physical therapy assessment, plan of care and goals were reviewed. ASSESSMENT  Patient continues with skilled PT services and is progressing towards goals. Patient was alert and cooperative. Patient was able to perform lower exercises and transfers with assistance due to weakness. Patient indicated that she will be going to rehab until she gets her strength returned and no further chemotherapy is planned. Current Level of Function Impacting Discharge (mobility/balance): Fall risk and will continue to require assistance with ADL and bathing    Other factors to consider for discharge: Require Assistance with ADL and gait          PLAN :  Patient continues to benefit from skilled intervention to address the above impairments. Continue treatment per established plan of care. to address goals. Recommendation for discharge: (in order for the patient to meet his/her long term goals)  Therapy up to 5 days/week in SNF setting    This discharge recommendation:  A follow-up discussion with the attending provider and/or case management is planned    IF patient discharges home will need the following DME: rolling walker       SUBJECTIVE:   Patient stated I have competed my chemotherapy.  \"I am always cold. \"    OBJECTIVE DATA SUMMARY:   Critical Behavior:  Neurologic State: Alert, Eyes open spontaneously  Orientation Level: Oriented to person, Oriented to place  Cognition: Decreased attention/concentration     Functional Mobility Training:  Bed Mobility:  Rolling: Minimum assistance  Supine to Sit: Minimum assistance  Sit to Supine:  Moderate assistance  Scooting: Minimum assistance        Transfers:  Sit to Stand: Minimum assistance  Stand to Sit: Minimum assistance Balance:  Sitting: Intact  Sitting - Static: Good (unsupported)  Sitting - Dynamic: Good (unsupported)  Standing: With support; Impaired  Standing - Static: Fair  Standing - Dynamic : Fair    Ambulation/Gait Training:      Side Stepping with rolling walker at bedside      Treatment Session:   Lower extremities exercises in supine position  Sit to stand with roller walker x 30 sec x 2    Pain Ratin/10    Activity Tolerance:   Fair, requires rest breaks, and requires frequent rest breaks    After treatment patient left in no apparent distress:   Supine in bed, Call bell within reach, Bed / chair alarm activated, and Side rails x 3    COMMUNICATION/COLLABORATION:   The patients plan of care was discussed with: Physical therapist.     Deana Sousa, PT,    Time Calculation: 20 mins         Problem: Mobility Impaired (Adult and Pediatric)  Goal: *Acute Goals and Plan of Care (Insert Text)  Description: FUNCTIONAL STATUS PRIOR TO ADMISSION: Patient was modified independent using a walker for functional mobility. HOME SUPPORT PRIOR TO ADMISSION: The patient lived alone with children to provide assistance. Physical Therapy Goals  Initiated 2023  1. Patient will move from supine to sit and sit to supine  in bed with independence within 7 day(s). 2.  Patient will transfer from bed to chair and chair to bed with supervision/set-up using the least restrictive device within 7 day(s). 3.  Patient will perform sit to stand with modified independence within 7 day(s). 4.  Patient will ambulate with supervision/set-up for 25 feet with the least restrictive device within 7 day(s).          Outcome: Progressing Towards Goal

## 2023-02-13 NOTE — PROGRESS NOTES
Spoke with patient's daughter and the contact information was given to Bayron Pantoja at [facility], per his request.

## 2023-02-13 NOTE — ROUTINE PROCESS
Has sleep most of the night, blood drawn from PICC without issues. Quiet night. New [place] inserted.

## 2023-02-13 NOTE — PROGRESS NOTES
Problem: Pressure Injury - Risk of  Goal: *Prevention of pressure injury  Description: Document Ravi Scale and appropriate interventions in the flowsheet. Outcome: Progressing Towards Goal  Note: Pressure Injury Interventions:  Sensory Interventions: Assess changes in LOC, Avoid rigorous massage over bony prominences, Check visual cues for pain, Keep linens dry and wrinkle-free, Minimize linen layers    Moisture Interventions: Absorbent underpads, Internal/External urinary devices, Maintain skin hydration (lotion/cream), Minimize layers, Moisture barrier    Activity Interventions: Increase time out of bed    Mobility Interventions: HOB 30 degrees or less    Nutrition Interventions: Document food/fluid/supplement intake, Offer support with meals,snacks and hydration    Friction and Shear Interventions: Apply protective barrier, creams and emollients, HOB 30 degrees or less, Minimize layers                Problem: Falls - Risk of  Goal: *Absence of Falls  Description: Document Jason Fall Risk and appropriate interventions in the flowsheet.   Outcome: Progressing Towards Goal  Note: Fall Risk Interventions:  Mobility Interventions: Bed/chair exit alarm, Patient to call before getting OOB, Utilize walker, cane, or other assistive device    Mentation Interventions: Bed/chair exit alarm, More frequent rounding, Reorient patient, Room close to nurse's station, Toileting rounds    Medication Interventions: Evaluate medications/consider consulting pharmacy, Bed/chair exit alarm, Patient to call before getting OOB    Elimination Interventions: Call light in reach

## 2023-02-13 NOTE — PROGRESS NOTES
John Higgins at [facility] to follow up on the converstation that he had with the patient's daughter regarding transportation to her chemo treatment. Neha Boone now stated that he has no bed and they will not be able to accept the patient. Referral called and faxed to [facility]- [facility] and [address] [place] [address].

## 2023-02-14 ENCOUNTER — PATIENT OUTREACH (OUTPATIENT)
Dept: CASE MANAGEMENT | Age: 83
End: 2023-02-14

## 2023-02-14 LAB
ALBUMIN SERPL-MCNC: 3.1 G/DL (ref 3.5–5)
ALBUMIN/GLOB SERPL: 1 (ref 1.1–2.2)
ALP SERPL-CCNC: 54 U/L (ref 45–117)
ALT SERPL-CCNC: 1097 U/L (ref 12–78)
ANION GAP SERPL CALC-SCNC: 10 MMOL/L (ref 5–15)
AST SERPL W P-5'-P-CCNC: 213 U/L (ref 15–37)
BILIRUB SERPL-MCNC: 0.3 MG/DL (ref 0.2–1)
BUN SERPL-MCNC: 17 MG/DL (ref 6–20)
BUN/CREAT SERPL: 16 (ref 12–20)
CA-I BLD-MCNC: 9 MG/DL (ref 8.5–10.1)
CHLORIDE SERPL-SCNC: 103 MMOL/L (ref 97–108)
CO2 SERPL-SCNC: 24 MMOL/L (ref 21–32)
CREAT SERPL-MCNC: 1.07 MG/DL (ref 0.55–1.02)
GLOBULIN SER CALC-MCNC: 3.1 G/DL (ref 2–4)
GLUCOSE SERPL-MCNC: 99 MG/DL (ref 65–100)
POTASSIUM SERPL-SCNC: 3.7 MMOL/L (ref 3.5–5.1)
PROT SERPL-MCNC: 6.2 G/DL (ref 6.4–8.2)
SODIUM SERPL-SCNC: 137 MMOL/L (ref 136–145)

## 2023-02-14 PROCEDURE — 74011000250 HC RX REV CODE- 250: Performed by: EMERGENCY MEDICINE

## 2023-02-14 PROCEDURE — 74011250637 HC RX REV CODE- 250/637: Performed by: HOSPITALIST

## 2023-02-14 PROCEDURE — 74011250636 HC RX REV CODE- 250/636: Performed by: HOSPITALIST

## 2023-02-14 PROCEDURE — 94640 AIRWAY INHALATION TREATMENT: CPT

## 2023-02-14 PROCEDURE — 80053 COMPREHEN METABOLIC PANEL: CPT

## 2023-02-14 PROCEDURE — 65270000029 HC RM PRIVATE

## 2023-02-14 PROCEDURE — 74011250637 HC RX REV CODE- 250/637: Performed by: INTERNAL MEDICINE

## 2023-02-14 PROCEDURE — 97116 GAIT TRAINING THERAPY: CPT

## 2023-02-14 RX ADMIN — ENOXAPARIN SODIUM 40 MG: 100 INJECTION SUBCUTANEOUS at 08:31

## 2023-02-14 RX ADMIN — MAGNESIUM OXIDE 400 MG: 400 TABLET ORAL at 08:31

## 2023-02-14 RX ADMIN — Medication 10 ML: at 13:24

## 2023-02-14 RX ADMIN — Medication 10 ML: at 05:57

## 2023-02-14 RX ADMIN — POLYETHYLENE GLYCOL 3350 17 G: 17 POWDER, FOR SOLUTION ORAL at 08:31

## 2023-02-14 RX ADMIN — SENNOSIDES 8.6 MG: 8.6 TABLET, COATED ORAL at 08:31

## 2023-02-14 RX ADMIN — TIOTROPIUM BROMIDE INHALATION SPRAY 2 PUFF: 3.12 SPRAY, METERED RESPIRATORY (INHALATION) at 08:44

## 2023-02-14 RX ADMIN — DOCUSATE SODIUM 100 MG: 50 LIQUID ORAL at 08:31

## 2023-02-14 NOTE — PROGRESS NOTES
CTN planned to follow up today, but upon chart review the patient is currently admitted.  Will continue to follow

## 2023-02-14 NOTE — PROGRESS NOTES
Progress Note  Date:2/14/2023       Room:[facility]  Patient Name:Oanh Pulido     YOB: 1940     Age:82 y.o. Subjective    As per admitting provider on 2/9:  Bisi Cade is a 80 y.o. 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 Patient is alert and oriented x3 however poor historian. Majority of the ROS and HPI obtained from patient's daughter who is at bedside in the ED. Daughter states that over the last 6 days, since discharge from the hospital at [facility], patient has not been doing well with decreased oral intake, nausea and vomiting periodically along with abdominal discomfort. She denies any fever/chills, chest pain, diarrhea, any new rash. Patient in the ED with a significant CT abdomen/pelvis finding of severe large amount of stool in the rectum with severe constipation however chest x-ray otherwise unremarkable for any acute process. Patient also found to have acute renal failure with an increase in creatinine up to 1.47 along with BUN elevated at 31 in addition to severe acute hepatitis with AST/ALT of 410/1161. LFTs upon discharge from [facility] were completely normal and creatinine was 0.97. Daughter states that the patient has been compliant with her COVID-19 medication Paxlovid and has only 1 more day to finish this medication. In the ED patient is not found to be hypoxic and with normotensive blood pressures and is essentially afebrile without a cough or any respiratory distress. She did have multiple bowel movements in the ED with improvement in her abdominal pain and discomfort noted.      Patient is admitted to the hospital with acute renal failure, acute hepatitis of unknown etiology and with severe constipation. 2/14: seen on follow up. Awaiting SNF placement. LFT continue to be on downward trend. No further nausea or vomiting. Review of Systems   Constitutional: Negative. HENT: Negative. Eyes: Negative. Respiratory: Negative. Cardiovascular: Negative. Endocrine: Negative. Genitourinary: Negative. Musculoskeletal: Negative. Skin: Negative. Allergic/Immunologic: Negative. Neurological:  Positive for weakness. Psychiatric/Behavioral: Negative. All other systems reviewed and are negative. Objective           Vitals Last 24 Hours:  Patient Vitals for the past 24 hrs:   Temp Pulse Resp BP SpO2   02/14/23 0844 -- -- -- -- 97 %   02/14/23 0715 98.2 °F (36.8 °C) 67 18 111/61 --   02/14/23 0343 98.3 °F (36.8 °C) 64 18 120/68 99 %   02/13/23 2330 98 °F (36.7 °C) 71 18 123/73 100 %   02/13/23 1922 98.1 °F (36.7 °C) 76 16 105/61 100 %   02/13/23 1626 97.3 °F (36.3 °C) 73 18 114/65 100 %   02/13/23 1219 96.8 °F (36 °C) 82 18 120/70 97 %          I/O (24Hr): Intake/Output Summary (Last 24 hours) at 2/14/2023 1128  Last data filed at 2/14/2023 0545  Gross per 24 hour   Intake 120 ml   Output 250 ml   Net -130 ml         Physical Exam:  General: Alert, cooperative, no distress, appears stated age. Head:  Normocephalic, without obvious abnormality, atraumatic. Eyes:  Conjunctivae/corneas clear. Pupils equal, round, reactive to light. Extraocular movements intact. Lungs: Diminished breath sounds bilaterally but no wheezes rales rhonchi  Chest wall: No tenderness or deformity. Heart:  Regular rate and rhythm, S1, S2 normal, no murmur, click, rub or gallop. Abdomen:  Soft, non-tender. Bowel sounds normal. No masses,  No organomegaly. Extremities: Extremities normal, atraumatic, no cyanosis or edema. Pulses: 2+ and symmetric all extremities. Skin: Skin color, texture, turgor normal. No rashes or lesions  Neurologic: Awake, Alert, oriented.  No obvious gross sensory or motor deficits      Medications           Current Facility-Administered Medications   Medication Dose Route Frequency    magnesium oxide (MAG-OX) tablet 400 mg  400 mg Oral DAILY    enoxaparin (LOVENOX) injection 40 mg  40 mg SubCUTAneous DAILY    polyethylene glycol (MIRALAX) packet 17 g  17 g Oral DAILY    sodium chloride (NS) flush 5-40 mL  5-40 mL IntraVENous Q8H    sodium chloride (NS) flush 5-40 mL  5-40 mL IntraVENous PRN    polyethylene glycol (MIRALAX) packet 17 g  17 g Oral DAILY PRN    ondansetron (ZOFRAN ODT) tablet 4 mg  4 mg Oral Q8H PRN    Or    ondansetron (ZOFRAN) injection 4 mg  4 mg IntraVENous Q6H PRN    docusate (COLACE) 50 mg/5 mL oral liquid 100 mg  100 mg Oral DAILY    senna (SENOKOT) tablet 8.6 mg  1 Tablet Oral DAILY    tiotropium bromide (SPIRIVA RESPIMAT) 2.5 mcg /actuation  2 Puff Inhalation DAILY         Allergies         Pcn [penicillins] and Tramadol       Labs/Imaging/Diagnostics      Labs:  Recent Results (from the past 48 hour(s))   METABOLIC PANEL, COMPREHENSIVE    Collection Time: 02/13/23  5:05 AM   Result Value Ref Range    Sodium 135 (L) 136 - 145 mmol/L    Potassium 3.9 3.5 - 5.1 mmol/L    Chloride 103 97 - 108 mmol/L    CO2 23 21 - 32 mmol/L    Anion gap 9 5 - 15 mmol/L    Glucose 105 (H) 65 - 100 mg/dL    BUN 19 6 - 20 mg/dL    Creatinine 0.99 0.55 - 1.02 mg/dL    BUN/Creatinine ratio 19 12 - 20      eGFR 57 (L) >60 ml/min/1.73m2    Calcium 9.1 8.5 - 10.1 mg/dL    Bilirubin, total 0.4 0.2 - 1.0 mg/dL    AST (SGOT) 361 (H) 15 - 37 U/L    ALT (SGPT) 1,376 (H) 12 - 78 U/L    Alk.  phosphatase 55 45 - 117 U/L    Protein, total 6.2 (L) 6.4 - 8.2 g/dL    Albumin 3.0 (L) 3.5 - 5.0 g/dL    Globulin 3.2 2.0 - 4.0 g/dL    A-G Ratio 0.9 (L) 1.1 - 2.2     MAGNESIUM    Collection Time: 02/13/23  5:05 AM   Result Value Ref Range    Magnesium 1.8 1.6 - 2.4 mg/dL   METABOLIC PANEL, COMPREHENSIVE    Collection Time: 02/14/23  5:30 AM   Result Value Ref Range    Sodium 137 136 - 145 mmol/L Potassium 3.7 3.5 - 5.1 mmol/L    Chloride 103 97 - 108 mmol/L    CO2 24 21 - 32 mmol/L    Anion gap 10 5 - 15 mmol/L    Glucose 99 65 - 100 mg/dL    BUN 17 6 - 20 mg/dL    Creatinine 1.07 (H) 0.55 - 1.02 mg/dL    BUN/Creatinine ratio 16 12 - 20      eGFR 52 (L) >60 ml/min/1.73m2    Calcium 9.0 8.5 - 10.1 mg/dL    Bilirubin, total 0.3 0.2 - 1.0 mg/dL    AST (SGOT) 213 (H) 15 - 37 U/L    ALT (SGPT) 1,097 (H) 12 - 78 U/L    Alk. phosphatase 54 45 - 117 U/L    Protein, total 6.2 (L) 6.4 - 8.2 g/dL    Albumin 3.1 (L) 3.5 - 5.0 g/dL    Globulin 3.1 2.0 - 4.0 g/dL    A-G Ratio 1.0 (L) 1.1 - 2.2          Imaging:  XR ABD (KUB)    Result Date: 2/8/2023  Moderate amount of stool in the distal colon and rectum. CT ABD PELV W CONT    Result Date: 2/9/2023  1. Unchanged 10 mm right lower lobe pulmonary nodule. 2. Small hiatal hernia. 3. Large amount of stool in the rectum. XR CHEST PORT    Result Date: 2/8/2023  No acute process    US RUQ    Result Date: 2/9/2023  1. Unremarkable appearance of the liver. 2.  Borderline thickening of the gallbladder wall is likely reactive to reported hepatitis.       Assessment//Plan           Problem List:  Hospital Problems  Date Reviewed: 2/2/2023            Codes Class Noted POA    Nausea & vomiting ICD-10-CM: R11.2  ICD-9-CM: 787.01  2/9/2023 Unknown        Acute kidney injury [facility]) ICD-10-CM: N17.9  ICD-9-CM: 584.9  2/9/2023 Unknown        * (Principal) Acute hepatitis ICD-10-CM: B17.9  ICD-9-CM: 570  2/9/2023 Unknown        Constipation ICD-10-CM: K59.00  ICD-9-CM: 564.00  2/9/2023 Unknown        Chronic obstructive pulmonary disease, unspecified [facility] ICD-10-CM: J44.9  ICD-9-CM: 390  2/8/2023 Yes        Lung mass ICD-10-CM: R91.8  ICD-9-CM: 786.6  3/8/2022 Yes        GERD (gastroesophageal reflux disease) ICD-10-CM: K21.9  ICD-9-CM: 530.81  8/28/2020 Yes        Hypertensive disorder ICD-10-CM: I10  ICD-9-CM: 401.9  8/28/2020 Yes       Acute hepatitis  - Severely elevated ALT/AST with normal bilirubin noted. - Will obtain acetaminophen level, acute hepatitis panel along with right upper quadrant ultrasound.  -Most likely secondary to recent start of Paxlovid which does and can cause abdominal pain nausea vomiting and acute hepatitis or according to medication profile  -Also nausea vomiting its possibly secondary to the severe constipation  - Significant elevation in transaminases is known with Ritonavir. And received last dose yesterday  -GI consult appreciated  -Follow daily LFTs  -2/13: Patient's ALT initially increased to 1665 on 2/12 but continues to be on downward trend   AST also initially increased to max of 665 but since that time has been on downward trend      Acute renal failure  - Likely secondary to side effects from medication for COVID-19 as well as poor oral intake secondary to nausea vomiting/constipation  -Given IV fluids overnight and repeat labs are in normal range so we will discontinue on 2/10  - stable bun/creat on 2/12     Stage IV metastatic Small cell lung cancer  - Status post right chest port for chemotherapy. - Follow-up with oncology on outpatient basis. -Delaying any further treatments until patient can get stronger post COVID  -From what daughter had mentioned that further treatments for her cancer are on hold until patient can get stronger and for that reason go to skilled rehab so that once has improvement in her overall weakness and general debility then can resume treatments. - was noted that patient in on q3 weeks chemo treatments and missed her last scheduled because of COVID and also increased weakness. COPD  - Currently compensated. - Continue Spiriva and as needed inhalers.      Failure to thrive  -Was placed on Megace but daughter states it really did not help so we will discontinue  -Nutrition consult appreciated  -Probably eating better we will continue to monitor closely    Generalized weakness  -Secondary to her underlying lung cancer as well as recent COVID and nausea vomiting diarrhea  -PT OT evaluation appreciated and recommend skilled rehab and awaiting placement     DVT prophylaxis  Lovenox sC. DNR/DNI  Discussed CODE STATUS extensively with patient's daughter and patient. Patient's daughter returned back to the nursing station later in the day and stated that the patient would be DNR and signed DNR paperwork. Spent 35 minutes evaluting and coordinating patient care of which >50% was spent coordinating and counseling.           Electronically signed by Ayanna Durbin MD on 2/14/2023 at 3:45 PM

## 2023-02-14 NOTE — PROGRESS NOTES
PHYSICAL THERAPY TREATMENT  Patient: William Yi (27 y.o. female)  Date: 2/14/2023  Diagnosis: Nausea & vomiting [R11.2]  Acute kidney injury ([facility] [N17.9]  Acute hepatitis [B17.9]  Constipation [K59.00] Acute hepatitis      Precautions:    Chart, physical therapy assessment, plan of care and goals were reviewed. ASSESSMENT  Patient continues with skilled PT services and is progressing towards goals. Patient continues to be very limited in functional mobility. Pt ambulates with very slow veronica and step to gait pattern. 15 ft ambulated today with RW and able to complete standing therex. Current Level of Function Impacting Discharge (mobility/balance): mobility, balance, gait, strength    Other factors to consider for discharge: patient lives alone and family available for assistance with ADLs prior to admission         PLAN :  Patient continues to benefit from skilled intervention to address the above impairments. Continue treatment per established plan of care. to address goals. Recommendation for discharge: (in order for the patient to meet his/her long term goals)  Therapy up to 5 days/week in SNF setting    This discharge recommendation:  Has been made in collaboration with the attending provider and/or case management    IF patient discharges home will need the following DME: none       SUBJECTIVE:   Patient stated Ina Murillo will do some walking.     OBJECTIVE DATA SUMMARY:   Critical Behavior:  Neurologic State: Alert  Orientation Level: Oriented X4  Cognition: Decreased attention/concentration, Recognition of people/places     Functional Mobility Training:  Bed Mobility:  Rolling: Stand-by assistance  Supine to Sit: Stand-by assistance     Scooting: Stand-by assistance        Transfers:  Sit to Stand: Minimum assistance;Contact guard assistance  Stand to Sit: Contact guard assistance;Minimum assistance                             Balance:  Sitting: Intact; Without support  Sitting - Static: Good (unsupported)  Sitting - Dynamic: Good (unsupported)  Standing: Impaired; With support  Standing - Static: Fair  Standing - Dynamic : Fair  Ambulation/Gait Training:  Distance (ft): 15 Feet (ft)  Assistive Device: Walker, rolling  Ambulation - Level of Assistance: Minimal assistance;Assist x1        Gait Abnormalities: Shuffling gait        Base of Support: Widened                             Stairs:              Treatment Session:   Pt tolerated session well. Patient able to get to EOB without assistance from DPT, demonstrating progression. Patient able to complete sit to stand and ambulate x15 ft within the room using rolling walker with step to gait pattern. Pt typically pauses after each gait cycle, and ambulates with slow gait speed. Patient then able to complete x5 sit to stand exercises and standing LE exercises using RW for support. Patient left sitting EOB with nursing present. Pain Ratin/10    Activity Tolerance:   Fair    After treatment patient left in no apparent distress:   EOB with nursing present    COMMUNICATION/COLLABORATION:   The patients plan of care was discussed with: Registered nurse, Physician, and Case management. Cecy Russell, PT, DPT    Time Calculation: 12 mins          Problem: Mobility Impaired (Adult and Pediatric)  Goal: *Acute Goals and Plan of Care (Insert Text)  Description: FUNCTIONAL STATUS PRIOR TO ADMISSION: Patient was modified independent using a walker for functional mobility. HOME SUPPORT PRIOR TO ADMISSION: The patient lived alone with children to provide assistance. Physical Therapy Goals  Initiated 2023  1. Patient will move from supine to sit and sit to supine  in bed with independence within 7 day(s). 2.  Patient will transfer from bed to chair and chair to bed with supervision/set-up using the least restrictive device within 7 day(s). 3.  Patient will perform sit to stand with modified independence within 7 day(s).   4.  Patient will ambulate with supervision/set-up for 25 feet with the least restrictive device within 7 day(s).          Outcome: Progressing Towards Goal

## 2023-02-14 NOTE — PROGRESS NOTES
Problem: Pressure Injury - Risk of  Goal: *Prevention of pressure injury  Description: Document Ravi Scale and appropriate interventions in the flowsheet. Outcome: Progressing Towards Goal  Note: Pressure Injury Interventions:  Sensory Interventions: Assess changes in LOC, Check visual cues for pain, Keep linens dry and wrinkle-free, Maintain/enhance activity level, Minimize linen layers    Moisture Interventions: Absorbent underpads, Apply protective barrier, creams and emollients, Check for incontinence Q2 hours and as needed, Internal/External urinary devices, Limit adult briefs, Maintain skin hydration (lotion/cream), Minimize layers, Moisture barrier    Activity Interventions: Increase time out of bed    Mobility Interventions: HOB 30 degrees or less (encourage turns)    Nutrition Interventions: Document food/fluid/supplement intake, Offer support with meals,snacks and hydration    Friction and Shear Interventions: HOB 30 degrees or less, Apply protective barrier, creams and emollients, Minimize layers                Problem: Gas Exchange - Impaired  Goal: Absence of hypoxia  Outcome: Progressing Towards Goal  Goal: Promote optimal lung function  Outcome: Progressing Towards Goal     Problem: Breathing Pattern - Ineffective  Goal: Ability to achieve and maintain a regular respiratory rate  Outcome: Progressing Towards Goal     Problem: Isolation Precautions - Risk of Spread of Infection  Goal: Prevent transmission of infectious organism to others  Outcome: Progressing Towards Goal     Problem: Nutrition Deficits  Goal: Optimize nutrtional status  Outcome: Progressing Towards Goal     Problem: Fatigue  Goal: Verbalize increase energy and improved vitality  Outcome: Progressing Towards Goal     Problem: Falls - Risk of  Goal: *Absence of Falls  Description: Document Jason Fall Risk and appropriate interventions in the flowsheet.   Outcome: Progressing Towards Goal  Note: Fall Risk Interventions:  Mobility Interventions: Bed/chair exit alarm, Patient to call before getting OOB, Utilize walker, cane, or other assistive device    Mentation Interventions: Bed/chair exit alarm, More frequent rounding, Reorient patient, Room close to nurse's station, Toileting rounds    Medication Interventions: Evaluate medications/consider consulting pharmacy, Bed/chair exit alarm, Patient to call before getting OOB    Elimination Interventions: Call light in reach

## 2023-02-14 NOTE — ROUTINE PROCESS
Has rested this shift free of complaints, stayed oriented  and reports she has some confusion at times.

## 2023-02-15 PROBLEM — E43 SEVERE PROTEIN-CALORIE MALNUTRITION (HCC): Status: ACTIVE | Noted: 2023-02-15

## 2023-02-15 PROCEDURE — 74011250637 HC RX REV CODE- 250/637: Performed by: INTERNAL MEDICINE

## 2023-02-15 PROCEDURE — 74011000250 HC RX REV CODE- 250: Performed by: EMERGENCY MEDICINE

## 2023-02-15 PROCEDURE — 74011250636 HC RX REV CODE- 250/636: Performed by: HOSPITALIST

## 2023-02-15 PROCEDURE — 65270000029 HC RM PRIVATE

## 2023-02-15 PROCEDURE — 97530 THERAPEUTIC ACTIVITIES: CPT

## 2023-02-15 PROCEDURE — 74011250637 HC RX REV CODE- 250/637: Performed by: HOSPITALIST

## 2023-02-15 PROCEDURE — 97116 GAIT TRAINING THERAPY: CPT

## 2023-02-15 PROCEDURE — 94640 AIRWAY INHALATION TREATMENT: CPT

## 2023-02-15 RX ADMIN — Medication 10 ML: at 00:10

## 2023-02-15 RX ADMIN — TIOTROPIUM BROMIDE INHALATION SPRAY 2 PUFF: 3.12 SPRAY, METERED RESPIRATORY (INHALATION) at 08:28

## 2023-02-15 RX ADMIN — Medication 10 ML: at 06:03

## 2023-02-15 RX ADMIN — Medication 10 ML: at 17:06

## 2023-02-15 RX ADMIN — Medication 10 ML: at 22:03

## 2023-02-15 RX ADMIN — DOCUSATE SODIUM 100 MG: 50 LIQUID ORAL at 11:05

## 2023-02-15 RX ADMIN — SENNOSIDES 8.6 MG: 8.6 TABLET, COATED ORAL at 11:04

## 2023-02-15 RX ADMIN — POLYETHYLENE GLYCOL 3350 17 G: 17 POWDER, FOR SOLUTION ORAL at 11:05

## 2023-02-15 RX ADMIN — ENOXAPARIN SODIUM 40 MG: 100 INJECTION SUBCUTANEOUS at 11:04

## 2023-02-15 RX ADMIN — MAGNESIUM OXIDE 400 MG: 400 TABLET ORAL at 11:04

## 2023-02-15 NOTE — PROGRESS NOTES
Progress Note  Date:2/15/2023       Room:[facility]  Patient Name:Oanh Pulido     YOB: 1940     Age:82 y.o. Subjective    As per admitting provider on 2/9:  Yolande Farmer is a 80 y.o. 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 Patient is alert and oriented x3 however poor historian. Majority of the ROS and HPI obtained from patient's daughter who is at bedside in the ED. Daughter states that over the last 6 days, since discharge from the hospital at [facility], patient has not been doing well with decreased oral intake, nausea and vomiting periodically along with abdominal discomfort. She denies any fever/chills, chest pain, diarrhea, any new rash. Patient in the ED with a significant CT abdomen/pelvis finding of severe large amount of stool in the rectum with severe constipation however chest x-ray otherwise unremarkable for any acute process. Patient also found to have acute renal failure with an increase in creatinine up to 1.47 along with BUN elevated at 31 in addition to severe acute hepatitis with AST/ALT of 410/1161. LFTs upon discharge from [facility] were completely normal and creatinine was 0.97. Daughter states that the patient has been compliant with her COVID-19 medication Paxlovid and has only 1 more day to finish this medication. In the ED patient is not found to be hypoxic and with normotensive blood pressures and is essentially afebrile without a cough or any respiratory distress. She did have multiple bowel movements in the ED with improvement in her abdominal pain and discomfort noted.      Patient is admitted to the hospital with acute renal failure, acute hepatitis of unknown etiology and with severe constipation. 2/15: no acute complaints. Continues to work with PT. appetite continues to do well. Review of Systems   Constitutional: Negative. HENT: Negative. Eyes: Negative. Respiratory: Negative. Cardiovascular: Negative. Endocrine: Negative. Genitourinary: Negative. Musculoskeletal: Negative. Skin: Negative. Allergic/Immunologic: Negative. Neurological:  Positive for weakness. Psychiatric/Behavioral: Negative. All other systems reviewed and are negative. Objective           Vitals Last 24 Hours:  Patient Vitals for the past 24 hrs:   Temp Pulse Resp BP SpO2   02/15/23 1205 97.3 °F (36.3 °C) 68 18 121/64 97 %   02/15/23 0828 -- -- -- -- 98 %   02/15/23 0800 97.3 °F (36.3 °C) 67 18 110/60 99 %   02/15/23 0410 97.8 °F (36.6 °C) 70 18 123/60 100 %   02/15/23 0010 97.1 °F (36.2 °C) 68 20 (!) 105/59 98 %   02/14/23 2300 -- -- -- -- 99 %   02/14/23 2034 97.8 °F (36.6 °C) 75 20 (!) 104/55 99 %   02/14/23 1648 97.8 °F (36.6 °C) 78 16 136/65 99 %          I/O (24Hr): Intake/Output Summary (Last 24 hours) at 2/15/2023 1600  Last data filed at 2/15/2023 0636  Gross per 24 hour   Intake 80 ml   Output 550 ml   Net -470 ml         Physical Exam:  General: Alert, cooperative, no distress, appears stated age. Head:  Normocephalic, without obvious abnormality, atraumatic. Eyes:  Conjunctivae/corneas clear. Pupils equal, round, reactive to light. Extraocular movements intact. Lungs: Diminished breath sounds bilaterally but no wheezes rales rhonchi  Chest wall: No tenderness or deformity. Heart:  Regular rate and rhythm, S1, S2 normal, no murmur, click, rub or gallop. Abdomen:  Soft, non-tender. Bowel sounds normal. No masses,  No organomegaly. Extremities: Extremities normal, atraumatic, no cyanosis or edema. Pulses: 2+ and symmetric all extremities. Skin: Skin color, texture, turgor normal. No rashes or lesions  Neurologic: Awake, Alert, oriented.  No obvious gross sensory or motor deficits      Medications           Current Facility-Administered Medications   Medication Dose Route Frequency    magnesium oxide (MAG-OX) tablet 400 mg  400 mg Oral DAILY    enoxaparin (LOVENOX) injection 40 mg  40 mg SubCUTAneous DAILY    polyethylene glycol (MIRALAX) packet 17 g  17 g Oral DAILY    sodium chloride (NS) flush 5-40 mL  5-40 mL IntraVENous Q8H    sodium chloride (NS) flush 5-40 mL  5-40 mL IntraVENous PRN    polyethylene glycol (MIRALAX) packet 17 g  17 g Oral DAILY PRN    ondansetron (ZOFRAN ODT) tablet 4 mg  4 mg Oral Q8H PRN    Or    ondansetron (ZOFRAN) injection 4 mg  4 mg IntraVENous Q6H PRN    docusate (COLACE) 50 mg/5 mL oral liquid 100 mg  100 mg Oral DAILY    senna (SENOKOT) tablet 8.6 mg  1 Tablet Oral DAILY    tiotropium bromide (SPIRIVA RESPIMAT) 2.5 mcg /actuation  2 Puff Inhalation DAILY         Allergies         Pcn [penicillins] and Tramadol       Labs/Imaging/Diagnostics      Labs:  Recent Results (from the past 48 hour(s))   METABOLIC PANEL, COMPREHENSIVE    Collection Time: 02/14/23  5:30 AM   Result Value Ref Range    Sodium 137 136 - 145 mmol/L    Potassium 3.7 3.5 - 5.1 mmol/L    Chloride 103 97 - 108 mmol/L    CO2 24 21 - 32 mmol/L    Anion gap 10 5 - 15 mmol/L    Glucose 99 65 - 100 mg/dL    BUN 17 6 - 20 mg/dL    Creatinine 1.07 (H) 0.55 - 1.02 mg/dL    BUN/Creatinine ratio 16 12 - 20      eGFR 52 (L) >60 ml/min/1.73m2    Calcium 9.0 8.5 - 10.1 mg/dL    Bilirubin, total 0.3 0.2 - 1.0 mg/dL    AST (SGOT) 213 (H) 15 - 37 U/L    ALT (SGPT) 1,097 (H) 12 - 78 U/L    Alk. phosphatase 54 45 - 117 U/L    Protein, total 6.2 (L) 6.4 - 8.2 g/dL    Albumin 3.1 (L) 3.5 - 5.0 g/dL    Globulin 3.1 2.0 - 4.0 g/dL    A-G Ratio 1.0 (L) 1.1 - 2.2          Imaging:  XR ABD (KUB)    Result Date: 2/8/2023  Moderate amount of stool in the distal colon and rectum. CT ABD PELV W CONT    Result Date: 2/9/2023  1. Unchanged 10 mm right lower lobe pulmonary nodule.  2. Small hiatal hernia. 3. Large amount of stool in the rectum. XR CHEST PORT    Result Date: 2/8/2023  No acute process    US RUQ    Result Date: 2/9/2023  1. Unremarkable appearance of the liver. 2.  Borderline thickening of the gallbladder wall is likely reactive to reported hepatitis. Assessment//Plan           Problem List:  Hospital Problems  Date Reviewed: 2/2/2023            Codes Class Noted POA    Severe protein-calorie malnutrition ([facility] ICD-10-CM: E43  ICD-9-CM: 419  2/15/2023 Yes        Nausea & vomiting ICD-10-CM: R11.2  ICD-9-CM: 787.01  2/9/2023 Unknown        Acute kidney injury ([facility] ICD-10-CM: N17.9  ICD-9-CM: 584.9  2/9/2023 Unknown        * (Principal) Acute hepatitis ICD-10-CM: B17.9  ICD-9-CM: 570  2/9/2023 Unknown        Constipation ICD-10-CM: K59.00  ICD-9-CM: 564.00  2/9/2023 Unknown        Chronic obstructive pulmonary disease, unspecified COPD type ([facility] ICD-10-CM: J44.9  ICD-9-CM: 828  2/8/2023 Yes        Lung mass ICD-10-CM: R91.8  ICD-9-CM: 786.6  3/8/2022 Yes        GERD (gastroesophageal reflux disease) ICD-10-CM: K21.9  ICD-9-CM: 530.81  8/28/2020 Yes        Hypertensive disorder ICD-10-CM: I10  ICD-9-CM: 401.9  8/28/2020 Yes       Acute hepatitis  - Severely elevated ALT/AST with normal bilirubin noted. - Will obtain acetaminophen level, acute hepatitis panel along with right upper quadrant ultrasound.  -Most likely secondary to recent start of Paxlovid which does and can cause abdominal pain nausea vomiting and acute hepatitis or according to medication profile  -Also nausea vomiting its possibly secondary to the severe constipation  - Significant elevation in transaminases is known with Ritonavir.   And received last dose yesterday  -GI consult appreciated  -Follow daily LFTs  -2/13: Patient's ALT initially increased to 1665 on 2/12 but continues to be on downward trend   AST also initially increased to max of 665 but since that time has been on downward trend      Acute renal failure  - Likely secondary to side effects from medication for COVID-19 as well as poor oral intake secondary to nausea vomiting/constipation  -Given IV fluids overnight and repeat labs are in normal range so we will discontinue on 2/10  - stable bun/creat on 2/12     Stage IV metastatic Small cell lung cancer  - Status post right chest port for chemotherapy. - Follow-up with oncology on outpatient basis. -Delaying any further treatments until patient can get stronger post COVID  -From what daughter had mentioned that further treatments for her cancer are on hold until patient can get stronger and for that reason go to skilled rehab so that once has improvement in her overall weakness and general debility then can resume treatments. - was noted that patient in on q3 weeks chemo treatments and missed her last scheduled because of COVID and also increased weakness. COPD  - Currently compensated. - Continue Spiriva and as needed inhalers. Failure to thrive  -Was placed on Megace but daughter states it really did not help so we will discontinue  -Nutrition consult appreciated  -Probably eating better we will continue to monitor closely    Generalized weakness  -Secondary to her underlying lung cancer as well as recent COVID and nausea vomiting diarrhea  -PT OT evaluation appreciated and recommend skilled rehab and awaiting placement     DVT prophylaxis  Lovenox sC. DNR/DNI  Discussed CODE STATUS extensively with patient's daughter and patient. Patient's daughter returned back to the nursing station later in the day and stated that the patient would be DNR and signed DNR paperwork. Spent 35 minutes evaluting and coordinating patient care of which >50% was spent coordinating and counseling.           Electronically signed by Kolby Graham MD on 2/15/2023 at 3:45 PM

## 2023-02-15 NOTE — ROUTINE PROCESS
Pt have had a restful night with no c/o voiced. She have been repositioned in bed. Mady See was changed & reicare was done. She have had no c/o of any n&v.

## 2023-02-15 NOTE — PROGRESS NOTES
Updated notes have been sent to [facility] and Rehab. They are attempting to get authorization.

## 2023-02-15 NOTE — ROUTINE PROCESS
The pt have been resting quietly in bed with no distress noted. She have been sleeping,but easy to arouse with no c/o being voiced,The purewick remains in place & draining well. She have been assisted with repositioning to comfort. No respiratory distress have been noted.

## 2023-02-15 NOTE — PROGRESS NOTES
PHYSICAL THERAPY TREATMENT  Patient: Bradly Melvin (1 y.o. female)  Date: 2/15/2023  Diagnosis: Nausea & vomiting [R11.2]  Acute kidney injury ([address] [N17.9]  Acute hepatitis [B17.9]  Constipation [K59.00] Acute hepatitis      Precautions:    Chart, physical therapy assessment, plan of care and goals were reviewed. ASSESSMENT  Patient continues with skilled PT services and is progressing towards goals. Pt able to ambulate farther today with less assistance required for transfers. Current Level of Function Impacting Discharge (mobility/balance): pt was living independently prior to admission    Other factors to consider for discharge: pt needs assistance for functional mobility         PLAN :  Patient continues to benefit from skilled intervention to address the above impairments. Continue treatment per established plan of care. to address goals. Recommendation for discharge: (in order for the patient to meet his/her long term goals)  Therapy up to 5 days/week in SNF setting    This discharge recommendation:  Has been made in collaboration with the attending provider and/or case management    IF patient discharges home will need the following DME: rolling walker       SUBJECTIVE:   Patient stated Lang Hammond will try to do what I can.     OBJECTIVE DATA SUMMARY:   Critical Behavior:  Neurologic State: Alert, Eyes open spontaneously  Orientation Level: Disoriented to person, Disoriented to place, Disoriented to situation  Cognition: Follows commands     Functional Mobility Training:  Bed Mobility:  Rolling: Stand-by assistance  Supine to Sit: Stand-by assistance     Scooting: Modified independent        Transfers:  Sit to Stand: Minimum assistance  Stand to Sit: Contact guard assistance  Stand Pivot Transfers: Contact guard assistance                          Balance:  Sitting: Intact  Sitting - Static: Good (unsupported)  Sitting - Dynamic: Good (unsupported)  Standing: Impaired; Without support  Standing - Static: Fair  Standing - Dynamic : Fair  Ambulation/Gait Training:  Distance (ft): 25 Feet (ft)  Assistive Device: Walker, rolling  Ambulation - Level of Assistance: Minimal assistance        Gait Abnormalities: Shuffling gait        Base of Support: Widened                         Treatment Session:   Pt needs verbal and tacite assistance in order to safely perform functional mobility, AD management, and with safe transfer sequencing. Pain Ratin/10    Activity Tolerance:   Good    After treatment patient left in no apparent distress:   Supine in bed, Call bell within reach, and Caregiver / family present    COMMUNICATION/COLLABORATION:   The patients plan of care was discussed with: Physical therapist, Physician, and Case management. Jay Centeno, PT, DPT   Time Calculation: 24 mins         Problem: Mobility Impaired (Adult and Pediatric)  Goal: *Acute Goals and Plan of Care (Insert Text)  Description: FUNCTIONAL STATUS PRIOR TO ADMISSION: Patient was modified independent using a walker for functional mobility. HOME SUPPORT PRIOR TO ADMISSION: The patient lived alone with children to provide assistance. Physical Therapy Goals  Initiated 2023  1. Patient will move from supine to sit and sit to supine  in bed with independence within 7 day(s). 2.  Patient will transfer from bed to chair and chair to bed with supervision/set-up using the least restrictive device within 7 day(s). 3.  Patient will perform sit to stand with modified independence within 7 day(s). 4.  Patient will ambulate with supervision/set-up for 25 feet with the least restrictive device within 7 day(s).          Outcome: Progressing Towards Goal

## 2023-02-15 NOTE — ROUTINE PROCESS
The report was received from the offgoing nurse [id number] [facility] RN. Care was resumed via the incoming nurse [facility] MARCELL TRUJILLO. The report consist of using kardex information.

## 2023-02-15 NOTE — ROUTINE PROCESS
Bedside shift change report given to katherine (oncoming nurse) by Zahra Khoury (offgoing nurse). Report included the following information Kardex.

## 2023-02-15 NOTE — PROGRESS NOTES
Comprehensive Nutrition Assessment    Type and Reason for Visit: Initial, RD nutrition re-screen/LOS    Nutrition Recommendations/Plan:   Continue current ETC diet  Ensure Enlive 2x/d (700kcals, 40g pro)     Malnutrition Assessment:  Malnutrition Status:  Severe malnutrition (02/15/23 1134)    Context:  Chronic illness     Findings of the 6 clinical characteristics of malnutrition:   Energy Intake:  75% or less est energy requirements for 1 month or longer  Weight Loss:  Greater than 10% over 6 months     Body Fat Loss:  No significant body fat loss,     Muscle Mass Loss:  Mild muscle mass loss, Scapula (trapezius)  Fluid Accumulation:  No significant fluid accumulation,     Strength:  Not performed     Nutrition Assessment:    Admitted for N/V, severe constipation, acute hepatitis. RD familiar with pt from recent admit to [address] [place] [address], on ONS 2x/d however limited intakes documented to assess. (2/15) pt limited historian, reports disliking breakfast (only cereal and fruit consumed), food preferences added. Reported intakes >50% of other meals however per EMR intakes are consistently <25%. Pt agreeable to ONS considering hx wt loss and hypercatabolic condition. Labs: H/H 10.0/29.5, Cr 1.07, , ALT 1097, Alb 3.1. Meds: colace, lovenox, mag-ox, miralax, senna. Nutrition Related Findings:    NFPE finding moderate wasting, noted pt WC bound at baseline. No hx dysphagia but currently on ETC diet. No N/V/D/C, last BM 2/12, soft. edema. Current Nutrition Intake & Therapies:  Average Meal Intake: 1-25%  Average Supplement Intake: None ordered  ADULT DIET Easy to Chew  ADULT ORAL NUTRITION SUPPLEMENT Breakfast, Lunch; Standard High Calorie/High Protein    Anthropometric Measures:  Height: 5' (152.4 cm)  Ideal Body Weight (IBW): 100 lbs (45 kg)  Admission Body Weight: 130 lb  Current Body Wt:  63.8 kg (140 lb 11.2 oz) (RD obtained at bedside), 140.7 % IBW.  Bed scale  Current BMI (kg/m2): 27.5  Usual Body Weight:  (pt reports wt loss 100# >1 year ago \"when I got sick\"-- ?#)     Weight Adjustment: No adjustment                 BMI Category: Overweight (BMI 25.0-29. 9)  Wt Readings from Last 10 Encounters:   02/12/23 59 kg (130 lb)   02/02/23 62.4 kg (137 lb 9.1 oz)   02/01/23 63 kg (139 lb)   01/31/23 63.2 kg (139 lb 4.8 oz)   08/07/22 66.7 kg (147 lb)   07/14/22 67 kg (147 lb 9.6 oz)   05/14/22 64.9 kg (143 lb)   05/11/22 65.2 kg (143 lb 12.8 oz)   04/28/22 70.8 kg (156 lb)   04/28/22 70.8 kg (156 lb)   Wt loss of 7.7kg x 6 months (11.5%, severe)    Estimated Daily Nutrient Needs:  Energy Requirements Based On: Kcal/kg  Weight Used for Energy Requirements: Current  Energy (kcal/day): 2240-2560kcals (35-40 kcal/kg, Lung CA w/ mets on chemo)  Weight Used for Protein Requirements: Current  Protein (g/day): 96-128g (1.5-2.0 gm/kg, Lung CA w/ mets on chemo)  Method Used for Fluid Requirements: 1 ml/kcal  Fluid (ml/day): 2240-2560ml    Nutrition Diagnosis:   Increased nutrient needs related to catabolic illness as evidenced by weight loss greater than or equal to 10% in 6 months  Severe malnutrition, In context of chronic illness related to catabolic illness as evidenced by Criteria as identified in malnutrition assessment    Nutrition Interventions:   Food and/or Nutrient Delivery: Continue current diet, Start oral nutrition supplement  Nutrition Education/Counseling: Education initiated (increased nutritional needs for cancer and wt maintenance)     Plan of Care discussed with: pt    Goals:     Goals: PO intake 50% or greater, by next RD assessment       Nutrition Monitoring and Evaluation:   Behavioral-Environmental Outcomes: None identified  Food/Nutrient Intake Outcomes: Diet advancement/tolerance, Food and nutrient intake, Supplement intake  Physical Signs/Symptoms Outcomes: Meal time behavior, Weight, Nutrition focused physical findings    Discharge Planning:    Continue current diet, Continue oral nutrition supplement    Brittany Wilson  Contact: Ext [phone], or via [facility]

## 2023-02-15 NOTE — PROGRESS NOTES
Spiritual Care Assessment/Progress Note  [facility]      NAME: Valencia Rizvi      MRN: [id number]  AGE: 80 y.o.  SEX: female  Shinto Affiliation: Anglican   Language: English     2/15/2023     Total Time (in minutes): 30     Spiritual Assessment begun in [facility] [address] through conversation with:         [x]Patient        [] Family    [] Friend(s)        Reason for Consult: Initial/Spiritual assessment, patient floor     Spiritual beliefs: (Please include comment if needed)     [x] Identifies with a tiburcio tradition:         [x] Supported by a tiburcio community:            [] Claims no spiritual orientation:           [] Seeking spiritual identity:                [] Adheres to an individual form of spirituality:           [] Not able to assess:                           Identified resources for coping:      [x] Prayer                               [] Music                  [] Guided Imagery     [x] Family/friends                 [] Pet visits     [] Devotional reading                         [] Unknown     [] Other:                                               Interventions offered during this visit: (See comments for more details)    Patient Interventions: Normalization of emotional/spiritual concerns, Initial/Spiritual assessment, patient floor, Affirmation of tiburcio, Life review/legacy, Prayer (assurance of)           Plan of Care:     [x] Support spiritual and/or cultural needs    [] Support AMD and/or advance care planning process      [] Support grieving process   [] Coordinate Rites and/or Rituals    [] Coordination with community clergy   [] No spiritual needs identified at this time   [] Detailed Plan of Care below (See Comments)  [] Make referral to Music Therapy  [] Make referral to Pet Therapy     [] Make referral to Addiction services  [] Make referral to [facility]  [] Make referral to Spiritual Care Partner  [x] No future visits requested        [] Contact Spiritual Care for further referrals     Comments: Patient seen by  Intern Hollie Brock. for Spiritual Care Service Assessment. Patient shares she is [place] and expressed adrianne of having five children, other family and lots of Voodoo support. Shared life's history and being proud of work history, but shared weariness of coping with medical concerns Expressed grief, shock and sadness at recent loss of a grandson. Listened attentively as patient affirmed and normalized emotions as she attested to her tiburcio and knowing that God does work things out. No future visits requested. YANI Brock.   Intern

## 2023-02-16 VITALS
DIASTOLIC BLOOD PRESSURE: 53 MMHG | TEMPERATURE: 97.3 F | HEART RATE: 65 BPM | RESPIRATION RATE: 16 BRPM | HEIGHT: 60 IN | SYSTOLIC BLOOD PRESSURE: 107 MMHG | BODY MASS INDEX: 25.52 KG/M2 | WEIGHT: 130 LBS | OXYGEN SATURATION: 98 %

## 2023-02-16 LAB
ALBUMIN SERPL-MCNC: 3.1 G/DL (ref 3.5–5)
ALBUMIN/GLOB SERPL: 1 (ref 1.1–2.2)
ALP SERPL-CCNC: 49 U/L (ref 45–117)
ALT SERPL-CCNC: 676 U/L (ref 12–78)
ANION GAP SERPL CALC-SCNC: 8 MMOL/L (ref 5–15)
AST SERPL W P-5'-P-CCNC: 110 U/L (ref 15–37)
BILIRUB SERPL-MCNC: 0.5 MG/DL (ref 0.2–1)
BUN SERPL-MCNC: 16 MG/DL (ref 6–20)
BUN/CREAT SERPL: 17 (ref 12–20)
CA-I BLD-MCNC: 9.2 MG/DL (ref 8.5–10.1)
CHLORIDE SERPL-SCNC: 104 MMOL/L (ref 97–108)
CO2 SERPL-SCNC: 24 MMOL/L (ref 21–32)
CREAT SERPL-MCNC: 0.95 MG/DL (ref 0.55–1.02)
GLOBULIN SER CALC-MCNC: 3 G/DL (ref 2–4)
GLUCOSE SERPL-MCNC: 90 MG/DL (ref 65–100)
POTASSIUM SERPL-SCNC: 4.2 MMOL/L (ref 3.5–5.1)
PROT SERPL-MCNC: 6.1 G/DL (ref 6.4–8.2)
SODIUM SERPL-SCNC: 136 MMOL/L (ref 136–145)

## 2023-02-16 PROCEDURE — 74011250637 HC RX REV CODE- 250/637: Performed by: INTERNAL MEDICINE

## 2023-02-16 PROCEDURE — 94640 AIRWAY INHALATION TREATMENT: CPT

## 2023-02-16 PROCEDURE — 74011000250 HC RX REV CODE- 250: Performed by: EMERGENCY MEDICINE

## 2023-02-16 PROCEDURE — 74011250637 HC RX REV CODE- 250/637: Performed by: HOSPITALIST

## 2023-02-16 PROCEDURE — 36415 COLL VENOUS BLD VENIPUNCTURE: CPT

## 2023-02-16 PROCEDURE — 80053 COMPREHEN METABOLIC PANEL: CPT

## 2023-02-16 PROCEDURE — 74011250636 HC RX REV CODE- 250/636: Performed by: HOSPITALIST

## 2023-02-16 RX ORDER — LANOLIN ALCOHOL/MO/W.PET/CERES
400 CREAM (GRAM) TOPICAL DAILY
Qty: 10 TABLET | Refills: 0 | Status: SHIPPED | OUTPATIENT
Start: 2023-02-17

## 2023-02-16 RX ORDER — DOCUSATE SODIUM 50 MG/5ML
100 LIQUID ORAL DAILY
Qty: 300 ML | Refills: 0 | Status: SHIPPED | OUTPATIENT
Start: 2023-02-17

## 2023-02-16 RX ORDER — SENNOSIDES 8.6 MG/1
1 TABLET ORAL DAILY
Qty: 30 TABLET | Refills: 0 | Status: SHIPPED | OUTPATIENT
Start: 2023-02-17

## 2023-02-16 RX ADMIN — ENOXAPARIN SODIUM 40 MG: 100 INJECTION SUBCUTANEOUS at 10:03

## 2023-02-16 RX ADMIN — SENNOSIDES 8.6 MG: 8.6 TABLET, COATED ORAL at 10:03

## 2023-02-16 RX ADMIN — MAGNESIUM OXIDE 400 MG: 400 TABLET ORAL at 10:03

## 2023-02-16 RX ADMIN — POLYETHYLENE GLYCOL 3350 17 G: 17 POWDER, FOR SOLUTION ORAL at 10:03

## 2023-02-16 RX ADMIN — DOCUSATE SODIUM 100 MG: 50 LIQUID ORAL at 10:03

## 2023-02-16 RX ADMIN — Medication 10 ML: at 06:34

## 2023-02-16 RX ADMIN — TIOTROPIUM BROMIDE INHALATION SPRAY 2 PUFF: 3.12 SPRAY, METERED RESPIRATORY (INHALATION) at 08:40

## 2023-02-16 NOTE — ROUTINE PROCESS
Report was received from the offgoing nurse Aileen Arzola. Care was resumed via the incoming nurse [facility] MARCELL TRUJILLO. The report consist of using kardex information.

## 2023-02-16 NOTE — ROUTINE PROCESS
The pt have had a restful night with no distress noted. There have been no change in the assessment. The pt urine output is small. She have been positioned to comfort during the night. Will continue to monitor.

## 2023-02-16 NOTE — ROUTINE PROCESS
The pt have been resting in bed with her eyes closed. She is arousable with calling her name. She have voiced no c/o. There have been no c/o of any n&v. She have been checked for incontinence & have been repositioned to comfort in bed. Giuliana Pierre

## 2023-02-16 NOTE — PROGRESS NOTES
Authorization for SNF has been approved and patient has been accepted to [facility] to room 241. Report to be called to [phone]. Daughter will be here around 1200 to take patient to the rehab facility. Primary nurse is aware. Care Management Interventions  PCP Verified by CM: Yes  Mode of Transport at Discharge: Other (see comment) (daughter)  Transition of Care Consult (CM Consult): SNF, Discharge Planning  Physical Therapy Consult: Yes  Support Systems: Child(bob)  Confirm Follow Up Transport: Family  The Plan for Transition of Care is Related to the Following Treatment Goals : Patient is being discharged to a SNF- Going to [facility].   The Patient and/or Patient Representative was Provided with a Choice of Provider and Agrees with the Discharge Plan?: Yes  Name of the Patient Representative Who was Provided with a Choice of Provider and Agrees with the Discharge Plan: Farhat Mcclelland- daughter  Freedom of Choice List was Provided with Basic Dialogue that Supports the Patient's Individualized Plan of Care/Goals, Treatment Preferences and Shares the Quality Data Associated with the Providers?: Yes  Discharge Location  Patient Expects to be Discharged to[de-identified] Skilled nursing facility    Readmission Assessment  Number of days since last admission?: 1-7 days  Previous disposition: Home with Home Health  Who is being interviewed?: Patient, Caregiver  What was the patient's/caregiver's perception as to why they think they needed to return back to the hospital?: Not enough help at home, Did not agree to original recommended D/C plan, Did not realize care needs would be so extensive (Original discharge recommendation was SNF- patient refused.)  Did you visit your Primary Care Physician after you left the hospital, before you returned this time?: Yes (Virtual visit on 2/8/2023)  Who advised the patient to return to the hospital?: Self-referral  Does the patient report anything that got in the way of taking their medications?: No  In our efforts to provide the best possible care to you and others like you, can you think of anything that we could have done to help you after you left the hospital the first time, so that you might not have needed to return so soon?: Other (Comment) (N/A)    Reason for Admission:   Acute hepatitis                 RUR Score:    21- high risk       PCP: First and Last name:  Frank Hernandez NP     Name of Practice: [facility]   Are you a current patient: Yes/No:yes   Approximate date of last visit: 2/8/2023   Can you do a virtual visit with your PCP: yes             Resources/supports as identified by patient/family:  Patient lives in her own home with her son. No caregiver availably for the level of care that the patient needs at this time. Top Challenges facing patient (as identified by patient/family and CM): Finances/Medication cost?      No issues              Transportation? No issues              Support system or lack thereof? Has support from daughter. Living arrangements? Own home                Self-care/ADLs/Cognition? Requires assistance with all ADL's at this time due to weakness. Some confusion at times. Current Advanced Directive/Advance Care Plan:  DNR      Healthcare Decision Maker:   Click here to complete HealthCare Decision Makers including selection of the Healthcare Decision Maker Relationship (ie \"Primary\")      Primary Decision MakeJohn Solis - [phone]    Payor Source Payor: Ronaldo Cuadra / Plan: [id number] PPO/PFFS / Product Type: Managed Care Medicare /                             Plan for utilizing home health:  No, patient will be discharged to a SNF. Transition of Care Plan:   Patient is being discharged to [facility] for rehab services.

## 2023-02-16 NOTE — DISCHARGE SUMMARY
Physician Discharge Summary       Patient: Chau Plunkett MRN: [id number]     YOB: 1940  Age: 80 y.o. Sex: female    PCP: Rani Diego NP    Allergies: Pcn [penicillins] and Tramadol    Admit date: 2/8/2023  Admitting Provider: Sonya Mccauley MD    Discharge date: 2/16/2023  Discharging Provider: Sin Aguiar MD    * Admission Diagnoses:   Nausea & vomiting [R11.2]  Acute kidney injury ([facility] [N17.9]  Acute hepatitis [B17.9]  Constipation [K59.00]      * Discharge Diagnoses:    Hospital Problems as of 2/16/2023 Date Reviewed: 2/2/2023            Codes Class Noted - Resolved POA    Severe protein-calorie malnutrition ([facility] ICD-10-CM: E43  ICD-9-CM: 262  2/15/2023 - Present Yes        Nausea & vomiting ICD-10-CM: R11.2  ICD-9-CM: 787.01  2/9/2023 - Present Unknown        Acute kidney injury ([facility] ICD-10-CM: N17.9  ICD-9-CM: 584.9  2/9/2023 - Present Unknown        * (Principal) Acute hepatitis ICD-10-CM: B17.9  ICD-9-CM: 890  2/9/2023 - Present Unknown        Constipation ICD-10-CM: K59.00  ICD-9-CM: 564.00  2/9/2023 - Present Unknown        Chronic obstructive pulmonary disease, unspecified COPD type ([facility] ICD-10-CM: J44.9  ICD-9-CM: 941  2/8/2023 - Present Yes        Lung mass ICD-10-CM: R91.8  ICD-9-CM: 786.6  3/8/2022 - Present Yes        GERD (gastroesophageal reflux disease) ICD-10-CM: K21.9  ICD-9-CM: 530.81  8/28/2020 - Present Yes        Hypertensive disorder ICD-10-CM: I10  ICD-9-CM: 401.9  8/28/2020 - Present Yes             * Hospital Course: As per admitting provider on 2/9:  Pedro Arrington is a 80 y.o. 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 Patient is alert and oriented x3 however poor historian.   Majority of the ROS and HPI obtained from patient's daughter who is at bedside in the ED. Daughter states that over the last 6 days, since discharge from the hospital at [facility], patient has not been doing well with decreased oral intake, nausea and vomiting periodically along with abdominal discomfort. She denies any fever/chills, chest pain, diarrhea, any new rash. Patient in the ED with a significant CT abdomen/pelvis finding of severe large amount of stool in the rectum with severe constipation however chest x-ray otherwise unremarkable for any acute process. Patient also found to have acute renal failure with an increase in creatinine up to 1.47 along with BUN elevated at 31 in addition to severe acute hepatitis with AST/ALT of 410/1161. LFTs upon discharge from [facility] were completely normal and creatinine was 0.97. Daughter states that the patient has been compliant with her COVID-19 medication Paxlovid and has only 1 more day to finish this medication. In the ED patient is not found to be hypoxic and with normotensive blood pressures and is essentially afebrile without a cough or any respiratory distress. She did have multiple bowel movements in the ED with improvement in her abdominal pain and discomfort noted. Patient is admitted to the hospital with acute renal failure, acute hepatitis of unknown etiology and with severe constipation. Acute hepatitis  - Severely elevated ALT/AST with normal bilirubin noted. - Will obtain acetaminophen level, acute hepatitis panel along with right upper quadrant ultrasound.  -Most likely secondary to recent start of Paxlovid which does and can cause abdominal pain nausea vomiting and acute hepatitis or according to medication profile  -Also nausea vomiting its possibly secondary to the severe constipation  - Significant elevation in transaminases is known with Ritonavir.   And received last dose yesterday  -GI consult appreciated  -2/13: Patient's ALT initially increased to 1665 on 2/12 but continues to be on downward trend   AST also initially increased to max of 665 but since that time has been on downward trend   - 2/16: continues to be improving with AST at 110 and ALT down to 676      Acute renal failure  - Likely secondary to side effects from medication for COVID-19 as well as poor oral intake secondary to nausea vomiting/constipation  -Given IV fluids overnight and repeat labs are in normal range so we will discontinue on 2/10  - stable bun/creat on 2/12     Stage IV metastatic Small cell lung cancer  - Status post right chest port for chemotherapy. - Follow-up with oncology on outpatient basis. -Delaying any further treatments until patient can get stronger post COVID  -From what daughter had mentioned that further treatments for her cancer are on hold until patient can get stronger and for that reason go to skilled rehab so that once has improvement in her overall weakness and general debility then can resume treatments. - was noted that patient in on q3 weeks chemo treatments and missed her last scheduled because of COVID and also increased weakness. COPD  - Currently compensated. - Continue Spiriva and as needed inhalers. Failure to thrive  -Was placed on Megace but daughter states it really did not help so we will discontinue  -Nutrition consult appreciated and continue current diet and ensure enlive 2x daily   - eating better and continue to monitor and encourage      Generalized weakness  -Secondary to her underlying lung cancer as well as recent COVID and nausea vomiting diarrhea  -PT OT evaluation appreciated and recommend skilled rehab and accepted at [facility] rehab     * Procedures:   * No surgery found *        Consults:   Gastroenterology Consult        Patient: Gonzales Alejandra MRN: [id number]  SSN: [id number]    YOB: 1940  Age: 80 y.o. Sex: female          Assessment:      1.   Acute hepatitis  -Appears most likely secondary to the Paxlovid which has a side effect of causing liver dysfunction. Patient had normal liver function studies up until 2/3/2023 and this coincides with the introduction of Paxlovid for her COVID-19 infection. 2.  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 Nausea and vomiting  4. COPD  5. GERD  6. Constipation with fecal loading  Plan:      1. Patient should be taken off the Paxlovid (done). 2.  Continue to monitor the LFTs which should show a gradual improvement. If the liver function should deteriorate then consider transfer to higher level of care. 3.  Continue the MiraLAX 17 g daily to keep stools soft. Subjective:      Reason for consultation: Acute hepatitis     History: 80-year-old female with history of stage IV metastatic small cell lung cancer with mets to brain on chemotherapy who was recently diagnosed with COVID-19 infection approximately 1 week prior to admission. The patient was started on Paxlovid daily. Patient started to have vomiting and abdominal pain and was brought to the emergency room where she was found to have markedly elevated LFTs. A CT scan of the abdomen and pelvis did show evidence of fecal loading. The patient's abdominal pain nausea vomiting did improve after she had multiple bowel movements. On review of patient's chart for last year it was found that all her liver function tests were normal with the last normal liver function test being on 2/3/2023. Only new medication was the Paxlovid that had been started after that period of time.     Comprehensive Nutrition Assessment     Type and Reason for Visit: Initial, RD nutrition re-screen/LOS     Nutrition Recommendations/Plan:   Continue current ETC diet  Ensure Enlive 2x/d (700kcals, 40g pro)      Malnutrition Assessment:  Malnutrition Status:  Severe malnutrition (02/15/23 1134)    Context:  Chronic illness     Findings of the 6 clinical characteristics of malnutrition:   Energy Intake:  75% or less est energy requirements for 1 month or longer  Weight Loss:  Greater than 10% over 6 months     Body Fat Loss:  No significant body fat loss,     Muscle Mass Loss:  Mild muscle mass loss, Scapula (trapezius)  Fluid Accumulation:  No significant fluid accumulation,     Strength:  Not performed      Nutrition Assessment:    Admitted for N/V, severe constipation, acute hepatitis. RD familiar with pt from recent admit to [address] [place] [address], on ONS 2x/d however limited intakes documented to assess. (2/15) pt limited historian, reports disliking breakfast (only cereal and fruit consumed), food preferences added. Reported intakes >50% of other meals however per EMR intakes are consistently <25%. Pt agreeable to ONS considering hx wt loss and hypercatabolic condition. Labs: H/H 10.0/29.5, Cr 1.07, , ALT 1097, Alb 3.1. Meds: colace, lovenox, mag-ox, miralax, senna. Nutrition Related Findings:    NFPE finding moderate wasting, noted pt WC bound at baseline. No hx dysphagia but currently on ETC diet. No N/V/D/C, last BM 2/12, soft. edema. Current Nutrition Intake & Therapies:  Average Meal Intake: 1-25%  Average Supplement Intake: None ordered  ADULT DIET Easy to Chew  ADULT ORAL NUTRITION SUPPLEMENT Breakfast, Lunch; Standard High Calorie/High Protein     Anthropometric Measures:  Height: 5' (152.4 cm)  Ideal Body Weight (IBW): 100 lbs (45 kg)  Admission Body Weight: 130 lb  Current Body Wt:  63.8 kg (140 lb 11.2 oz) (RD obtained at bedside), 140.7 % IBW. Bed scale  Current BMI (kg/m2): 27.5  Usual Body Weight:  (pt reports wt loss 100# >1 year ago \"when I got sick\"-- ?#)  Weight Adjustment: No adjustment  BMI Category: Overweight (BMI 25.0-29. 9)      Wt Readings from Last 10 Encounters:   02/12/23 59 kg (130 lb)   02/02/23 62.4 kg (137 lb 9.1 oz)   02/01/23 63 kg (139 lb)   01/31/23 63.2 kg (139 lb 4.8 oz)   08/07/22 66.7 kg (147 lb)   07/14/22 67 kg (147 lb 9.6 oz)   05/14/22 64.9 kg (143 lb)   05/11/22 65.2 kg (143 lb 12.8 oz)   04/28/22 70.8 kg (156 lb)   04/28/22 70.8 kg (156 lb)   Wt loss of 7.7kg x 6 months (11.5%, severe)     Estimated Daily Nutrient Needs:  Energy Requirements Based On: Kcal/kg  Weight Used for Energy Requirements: Current  Energy (kcal/day): 2240-2560kcals (35-40 kcal/kg, Lung CA w/ mets on chemo)  Weight Used for Protein Requirements: Current  Protein (g/day): 96-128g (1.5-2.0 gm/kg, Lung CA w/ mets on chemo)  Method Used for Fluid Requirements: 1 ml/kcal  Fluid (ml/day): 2240-2560ml     Nutrition Diagnosis:   Increased nutrient needs related to catabolic illness as evidenced by weight loss greater than or equal to 10% in 6 months  Severe malnutrition, In context of chronic illness related to catabolic illness as evidenced by Criteria as identified in malnutrition assessment     Nutrition Interventions:   Food and/or Nutrient Delivery: Continue current diet, Start oral nutrition supplement  Nutrition Education/Counseling: Education initiated (increased nutritional needs for cancer and wt maintenance)  Plan of Care discussed with: pt     Goals:  Goals: PO intake 50% or greater, by next RD assessment     Nutrition Monitoring and Evaluation:   Behavioral-Environmental Outcomes: None identified  Food/Nutrient Intake Outcomes: Diet advancement/tolerance, Food and nutrient intake, Supplement intake  Physical Signs/Symptoms Outcomes: Meal time behavior, Weight, Nutrition focused physical findings     Discharge Planning:    Continue current diet, Continue oral nutrition supplement     Brittany Wilson  Contact: Ext [phone], or via [facility]     PHYSICAL THERAPY TREATMENT  Patient: Vitor Andujar (59 y.o. female)  Date: 2/15/2023  Diagnosis: Nausea & vomiting [R11.2]  Acute kidney injury ([address] [N17.9]  Acute hepatitis [B17.9]  Constipation [K59.00] Acute hepatitis      Precautions:    Chart, physical therapy assessment, plan of care and goals were reviewed.      ASSESSMENT  Patient continues with skilled PT services and is progressing towards goals. Pt able to ambulate farther today with less assistance required for transfers. Current Level of Function Impacting Discharge (mobility/balance): pt was living independently prior to admission     Other factors to consider for discharge: pt needs assistance for functional mobility          PLAN :  Patient continues to benefit from skilled intervention to address the above impairments. Continue treatment per established plan of care. to address goals. Recommendation for discharge: (in order for the patient to meet his/her long term goals)  Therapy up to 5 days/week in SNF setting     This discharge recommendation:  Has been made in collaboration with the attending provider and/or case management     IF patient discharges home will need the following DME: rolling walker       Vitals Last 24 Hours:  Patient Vitals for the past 24 hrs:   Temp Pulse Resp BP SpO2   02/16/23 0840 -- -- -- -- 98 %   02/16/23 0750 97.3 °F (36.3 °C) 65 16 (!) 107/53 98 %   02/16/23 0415 97.1 °F (36.2 °C) 65 18 (!) 107/55 99 %   02/16/23 0022 97.8 °F (36.6 °C) 71 18 (!) 114/59 100 %   02/15/23 2250 -- -- -- -- 96 %   02/15/23 1943 98.9 °F (37.2 °C) 70 20 (!) 102/55 96 %   02/15/23 1653 97.3 °F (36.3 °C) 77 18 132/68 98 %   02/15/23 1205 97.3 °F (36.3 °C) 68 18 121/64 97 %        Discharge Exam:  General: Alert, cooperative, no distress, appears stated age. Head:  Normocephalic, without obvious abnormality, atraumatic. Eyes:  Conjunctivae/corneas clear. Pupils equal, round, reactive to light. Extraocular movements intact. Lungs:  Clear to auscultation bilaterally. no wheeze, rales, crackles, rhonchi   Chest wall: No tenderness or deformity. Heart:  Regular rate and rhythm, S1, S2 normal, no murmur, click, rub or gallop. Abdomen:  Soft, non-tender. Bowel sounds normal. No masses,  No organomegaly.   Extremities: Extremities normal, atraumatic, no cyanosis or edema.  Pulses: 2+ and symmetric all extremities. Skin: Skin color, texture, turgor normal. No rashes or lesions  Neurologic: Awake, Alert, oriented. No obvious gross sensory or motor deficits    Labs:  Recent Results (from the past 24 hour(s))   METABOLIC PANEL, COMPREHENSIVE    Collection Time: 02/16/23  5:34 AM   Result Value Ref Range    Sodium 136 136 - 145 mmol/L    Potassium 4.2 3.5 - 5.1 mmol/L    Chloride 104 97 - 108 mmol/L    CO2 24 21 - 32 mmol/L    Anion gap 8 5 - 15 mmol/L    Glucose 90 65 - 100 mg/dL    BUN 16 6 - 20 mg/dL    Creatinine 0.95 0.55 - 1.02 mg/dL    BUN/Creatinine ratio 17 12 - 20      eGFR 60 (L) >60 ml/min/1.73m2    Calcium 9.2 8.5 - 10.1 mg/dL    Bilirubin, total 0.5 0.2 - 1.0 mg/dL    AST (SGOT) 110 (H) 15 - 37 U/L    ALT (SGPT) 676 (H) 12 - 78 U/L    Alk. phosphatase 49 45 - 117 U/L    Protein, total 6.1 (L) 6.4 - 8.2 g/dL    Albumin 3.1 (L) 3.5 - 5.0 g/dL    Globulin 3.0 2.0 - 4.0 g/dL    A-G Ratio 1.0 (L) 1.1 - 2.2           Imaging:  US RUQ    Result Date: 2/9/2023  1. Unremarkable appearance of the liver. 2.  Borderline thickening of the gallbladder wall is likely reactive to reported hepatitis. * Discharge Condition: improved  * Disposition: SNF/LTC      Discharge Medications:  Current Discharge Medication List        START taking these medications    Details   docusate (COLACE) 50 mg/5 mL liquid Take 10 mL by mouth daily. Qty: 300 mL, Refills: 0  Start date: 2/17/2023      senna (SENOKOT) 8.6 mg tablet Take 1 Tablet by mouth daily. Qty: 30 Tablet, Refills: 0  Start date: 2/17/2023      magnesium oxide (MAG-OX) 400 mg tablet Take 1 Tablet by mouth daily. Qty: 10 Tablet, Refills: 0  Start date: 2/17/2023           CONTINUE these medications which have NOT CHANGED    Details   metoprolol succinate (TOPROL-XL) 25 mg XL tablet Take 25 mg by mouth daily.  1/2 tablet      Spiriva Respimat 2.5 mcg/actuation inhaler INHALE 2 SPRAY(S) BY MOUTH ONCE DAILY      albuterol (PROVENTIL HFA, VENTOLIN HFA, PROAIR HFA) 90 mcg/actuation inhaler Take 1 Puff by inhalation every four (4) hours as needed for Wheezing. Qty: 18 g, Refills: 1    Associated Diagnoses: COVID-19; Persistent cough           STOP taking these medications       losartan (COZAAR) 25 mg tablet Comments:   Reason for Stopping:         nirmatrelvir-ritonavir (PAXLOVID) 300 mg (150 mg x 2)-100 mg Comments:   Reason for Stopping:         ondansetron (ZOFRAN ODT) 4 mg disintegrating tablet Comments:   Reason for Stopping:         megestroL 625 mg/5 mL (125 mg/mL) suspension Comments:   Reason for Stopping:                 * Follow-up Care/Patient Instructions: Activity: Activity as tolerated  Diet: soft regular diet   Nutrition Recommendations/Plan:   Continue current ETC diet  Ensure Enlive 2x/d (700kcals, 40g pro      Follow-up Information       Follow up With Specialties Details Why Contact Info    Dario Mercedes NP Family Nurse Practitioner Follow up on 2/23/2023 @ 2:00 [id number] [place] [address]  [phone]      [facility] and [address] Follow up  [address] [place] [address]. [address] [place] [address] Drive [address] [address] [phone]          Spent 35 minutes evaluting and coordinating patient care and dc to SNF of which >50% was spent coordinating and counseling.        Signed:  Yelena Torrez MD  2/16/2023  10:55 AM

## 2023-02-21 ENCOUNTER — PATIENT OUTREACH (OUTPATIENT)
Dept: CASE MANAGEMENT | Age: 83
End: 2023-02-21

## 2023-02-28 ENCOUNTER — TELEPHONE (OUTPATIENT)
Dept: PRIMARY CARE CLINIC | Age: 83
End: 2023-02-28

## 2023-02-28 NOTE — TELEPHONE ENCOUNTER
Request for hospital bed as been done, according to [facility] pt would have to $11.96 up front and then $11.96 monthly for the bed. Tried calling pt and daughter to let them know and the number to call to get this set up and delivered, no answer LVM with the daughter.

## 2023-03-07 ENCOUNTER — TELEPHONE (OUTPATIENT)
Dept: PRIMARY CARE CLINIC | Age: 83
End: 2023-03-07

## 2023-03-07 DIAGNOSIS — C34.90 PRIMARY MALIGNANT NEOPLASM OF LUNG METASTATIC TO OTHER SITE, UNSPECIFIED LATERALITY (HCC): Primary | ICD-10-CM

## 2023-03-07 NOTE — TELEPHONE ENCOUNTER
Debora Solares Consultant came by office. She stated she had just been to patient's home for Hospice evaluation. Patient is eligible for Hospice because she cannot walk now and she has a chemo appointment this Friday that she is not going to. Magen Barrios stated all she needed was an order for patient to have Hospice care and the last OV notes and lab results if any faxed to her at [phone].

## 2023-03-08 ENCOUNTER — TELEPHONE (OUTPATIENT)
Dept: PRIMARY CARE CLINIC | Age: 83
End: 2023-03-08

## 2023-05-18 RX ORDER — ALBUTEROL SULFATE 90 UG/1
1 AEROSOL, METERED RESPIRATORY (INHALATION) EVERY 4 HOURS PRN
COMMUNITY
Start: 2022-01-25

## 2023-05-18 RX ORDER — MAGNESIUM OXIDE 400 MG/1
400 TABLET ORAL DAILY
COMMUNITY
Start: 2023-02-17

## 2023-05-18 RX ORDER — METOPROLOL SUCCINATE 25 MG/1
25 TABLET, EXTENDED RELEASE ORAL DAILY
COMMUNITY

## 2023-05-18 RX ORDER — SENNA PLUS 8.6 MG/1
8.6 TABLET ORAL DAILY
COMMUNITY
Start: 2023-02-17

## 2023-05-18 RX ORDER — DOCUSATE SODIUM 50 MG/5ML
100 LIQUID ORAL DAILY
COMMUNITY
Start: 2023-02-17

## 2024-01-01 RX ORDER — AMOXICILLIN 250 MG
CAPSULE ORAL
Qty: 90 TABLET | Refills: 0 | OUTPATIENT
Start: 2024-01-01

## 2024-01-01 RX ORDER — MAGNESIUM 200 MG
TABLET ORAL
Qty: 90 TABLET | Refills: 0 | OUTPATIENT
Start: 2024-01-01

## 2024-01-01 RX ORDER — ESCITALOPRAM OXALATE 10 MG/1
TABLET ORAL
Qty: 90 TABLET | Refills: 0 | OUTPATIENT
Start: 2024-01-01

## 2024-01-01 RX ORDER — METOPROLOL SUCCINATE 25 MG/1
TABLET, EXTENDED RELEASE ORAL
Qty: 45 TABLET | Refills: 0 | Status: CANCELLED | OUTPATIENT
Start: 2024-01-01

## 2025-01-29 NOTE — PROGRESS NOTES
Na this morning 143.   Nephrology has been following closely, adjusting D5W as necessary.    Discontinued D5W today 1/29.    The COVID-19 test result was positive  Mild and stable symptoms are managed at home    Treatment of coronavirus does not require an antibiotic  Remain isolated for at least 5 days since symptoms first appeared AND have improved symptoms. Advise to wear tight fitting mask for additional 5 days after discontinuing isolation. Recovery/symptom improvement is defined as resolution of fever without the use of fever-reducing medications with progressive improvement or resolution of other symptoms    Wash hands often with soap and water for at least 20 seconds or alternatively use hand  with at least 60% alcohol content  Cover coughs and sneezes  Wear a mask when around others if possible  Clean all \"high-touch\" surfaces every day, such as doorknobs and cellphones  Continually monitor symptoms.  Contact your medical provider if symptoms are worsening, such as difficulty breathing  For more information visit the CDC website: DotProtection.gl

## 2025-02-09 NOTE — PROGRESS NOTES
New Hines is a 80 y.o. female who presents to the office today for the following:    Chief Complaint   Patient presents with    Cough     pt states she is sore in her ribs from coughing    Nasal Congestion    Ear Pain       Past Medical History:   Diagnosis Date    Arthritis     Diverticulosis     GERD (gastroesophageal reflux disease)     Hypertension     Tachycardia     Vertigo     Wears glasses        Past Surgical History:   Procedure Laterality Date    HX COLONOSCOPY      HX HYSTERECTOMY      partial        Family History   Problem Relation Age of Onset    Diabetes Mother     OSTEOARTHRITIS Father         Social History     Tobacco Use    Smoking status: Former Smoker     Packs/day: 0.50    Smokeless tobacco: Never Used   Vaping Use    Vaping Use: Never used   Substance Use Topics    Alcohol use: Yes     Comment: occasional    Drug use: Never      HPI  Patient with PMH of tobbacco dependence, GERD, obesity, hypertension, BPPV and tachycardia who presents with complaint of 1 week of cough, ear pain and headache. States that she has been taking OTC mucinex but not helping much. Cough is worse at night. No fever, shortness of breath or chest pain. Is fully vaccinated against covid but was exposed to someone in past 3 days who just tested positive . Current Outpatient Medications on File Prior to Visit   Medication Sig    losartan (COZAAR) 25 mg tablet TAKE 1 TABLET BY MOUTH EVERY DAY    cetirizine (ZYRTEC) 10 mg tablet Take 1 Tablet by mouth daily.  fluticasone propionate (FLONASE) 50 mcg/actuation nasal spray SPRAY 2 SPRAYS INTO EACH NOSTRIL EVERY DAY    metoprolol succinate (TOPROL-XL) 25 mg XL tablet Take 1 Tablet by mouth daily. No current facility-administered medications on file prior to visit.         Medications Ordered Today   Medications    predniSONE (DELTASONE) 20 mg tablet     Sig: Take 2 tablets by mouth x 3 days then 1 tablet by mouth x 3 days     Dispense:  9 Tablet     Refill:  0    doxycycline (ADOXA) 100 mg tablet     Sig: Take 1 Tablet by mouth two (2) times a day for 10 days. Dispense:  20 Tablet     Refill:  0    meclizine (ANTIVERT) 25 mg tablet     Sig: Take 1 Tablet by mouth three (3) times daily as needed for Dizziness for up to 10 days. Dispense:  30 Tablet     Refill:  0        Review of Systems   Constitutional: Negative for chills, diaphoresis, fever, malaise/fatigue and weight loss. HENT: Positive for congestion and ear pain . Negative for ear discharge, sinus pain and sore throat. Respiratory: Positive for cough and sputum production. Negative for hemoptysis, shortness of breath and wheezing. Cardiovascular: Negative. Gastrointestinal: Negative. Genitourinary: Negative. Musculoskeletal: Negative. Neurological: Positive for dizziness (occasional ) and headaches. Visit Vitals  /69 (BP 1 Location: Left upper arm, BP Patient Position: Sitting, BP Cuff Size: Adult)   Pulse 72   Temp 98.3 °F (36.8 °C) (Temporal)   Resp 20   Wt 166 lb (75.3 kg)   SpO2 98%   BMI 32.42 kg/m²       Physical Exam  Vitals and nursing note reviewed. Constitutional:       General: She is not in acute distress. Appearance: Normal appearance. She is obese. HENT:      Right Ear: Tympanic membrane normal.      Left Ear: Tympanic membrane normal.      Mouth/Throat:      Pharynx: No oropharyngeal exudate or posterior oropharyngeal erythema. Eyes:      Pupils: Pupils are equal, round, and reactive to light. Cardiovascular:      Rate and Rhythm: Normal rate and regular rhythm. Pulses: Normal pulses. Heart sounds: Normal heart sounds. Pulmonary:      Effort: Pulmonary effort is normal. No respiratory distress. Breath sounds: Rhonchi (upper airways) present. No wheezing. Abdominal:      General: Bowel sounds are normal.      Palpations: Abdomen is soft. Tenderness: There is no abdominal tenderness. Musculoskeletal:         General: Normal range of motion. Lymphadenopathy:      Cervical: No cervical adenopathy. Skin:     General: Skin is warm and dry. Neurological:      Mental Status: She is alert and oriented to person, place, and time. Mental status is at baseline. 1. Acute bronchitis, unspecified organism  Symptoms > 7 days   Will treat with prednisone and doxycycline as directed  Did recently quit smoking in the past month which may be contributing to excess coughing also  She was just exposed 3 days ago to possible covid 19 so feel this is unlikely from covid infection but will rule out. Supportive care encouraged including rest, increased oral fluids, cool mist humidifier and Tylenol prn. Will have her isolate at home per cdc guidelines and discuss further recommendations pending test results and symptom progression  Advised if develops fever, chest pain, shortness of breath or other worsening condition, seek emergency care. - predniSONE (DELTASONE) 20 mg tablet; Take 2 tablets by mouth x 3 days then 1 tablet by mouth x 3 days  Dispense: 9 Tablet; Refill: 0  - doxycycline (ADOXA) 100 mg tablet; Take 1 Tablet by mouth two (2) times a day for 10 days. Dispense: 20 Tablet; Refill: 0    2. Exposure to COVID-19 virus  See above  - NOVEL CORONAVIRUS (COVID-19)            Patient verbalizes understanding of plan of care as discussed above    Follow-up and Dispositions    · Return if symptoms worsen or fail to improve. No
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
## (undated) DEVICE — BASIC SINGLE BASIN-LF: Brand: MEDLINE INDUSTRIES, INC.

## (undated) DEVICE — GARMENT,MEDLINE,DVT,INT,CALF,MED, GEN2: Brand: MEDLINE

## (undated) DEVICE — CONTAINER,SPECIMEN,3OZ,OR STRL: Brand: MEDLINE

## (undated) DEVICE — MAJ-1414 SINGLE USE ADPATER BIOPSY VALV: Brand: SINGLE USE ADAPTOR BIOPSY VALVE

## (undated) DEVICE — MARKER SKN REG TIP W/RULER -- STRL

## (undated) DEVICE — SINGLE USE BIOPSY VALVE MAJ-210: Brand: SINGLE USE BIOPSY VALVE (STERILE)

## (undated) DEVICE — DECANTER BAG 9": Brand: MEDLINE INDUSTRIES, INC.

## (undated) DEVICE — SOUTHSIDE TURNOVER: Brand: MEDLINE INDUSTRIES, INC.

## (undated) DEVICE — SYRINGE 10ML HYPO W/O NDL W/ LUER SLP TP

## (undated) DEVICE — ULNAR NERVE PROTECTOR FOAM POSITIONER: Brand: CARDINAL HEALTH

## (undated) DEVICE — SPONGE GZ W4XL4IN COT 12 PLY TYP VII WVN C FLD DSGN

## (undated) DEVICE — GOWN,PREVENTION PLUS,XLN/XL,ST,24/CS: Brand: MEDLINE

## (undated) DEVICE — Device: Brand: BALLOON

## (undated) DEVICE — COVER LT HNDL BLU PLAS

## (undated) DEVICE — SYRINGE 20ML E/T: Brand: SYRINGE 20ML E/T

## (undated) DEVICE — SC 3W MP RA OFF PB - PG: Brand: NAMIC

## (undated) DEVICE — ADAPTER TBNG DIA15MM SWVL FBROPT BRONCHSCP TERM 2 AXIS PEEP

## (undated) DEVICE — PROTECTOR EYE AD

## (undated) DEVICE — SOLUTION IV 1000ML 140MEQ/L SOD 5MEQ/L K 3MEQ/L MG 27MEQ/L

## (undated) DEVICE — LAMINECTOMY ARM CRADLE FOAM POSITIONER: Brand: CARDINAL HEALTH

## (undated) DEVICE — TOWEL SURG W17XL27IN STD BLU COT NONFENESTRATED PREWASHED

## (undated) DEVICE — SYR 5ML 1/5 GRAD LL NSAF LF --

## (undated) DEVICE — DRAPE,REIN 53X77,STERILE: Brand: MEDLINE

## (undated) DEVICE — KIT COLON W/ 1.1OZ LUB AND 2 END

## (undated) DEVICE — GLOVE ORANGE PI 8   MSG9080

## (undated) DEVICE — TUBING, SUCTION, 1/4" X 12', STRAIGHT: Brand: MEDLINE

## (undated) DEVICE — SINGLE USE SUCTION VALVE MAJ-209: Brand: SINGLE USE SUCTION VALVE (STERILE)